# Patient Record
Sex: FEMALE | Race: WHITE | NOT HISPANIC OR LATINO | Employment: FULL TIME | URBAN - METROPOLITAN AREA
[De-identification: names, ages, dates, MRNs, and addresses within clinical notes are randomized per-mention and may not be internally consistent; named-entity substitution may affect disease eponyms.]

---

## 2019-01-10 ENCOUNTER — OFFICE VISIT (OUTPATIENT)
Dept: INTERNAL MEDICINE | Facility: CLINIC | Age: 69
End: 2019-01-10
Attending: INTERNAL MEDICINE
Payer: MEDICARE

## 2019-01-10 ENCOUNTER — LAB VISIT (OUTPATIENT)
Dept: LAB | Facility: OTHER | Age: 69
End: 2019-01-10
Attending: INTERNAL MEDICINE
Payer: MEDICARE

## 2019-01-10 VITALS
BODY MASS INDEX: 32 KG/M2 | HEART RATE: 85 BPM | WEIGHT: 163 LBS | HEIGHT: 60 IN | OXYGEN SATURATION: 97 % | DIASTOLIC BLOOD PRESSURE: 68 MMHG | SYSTOLIC BLOOD PRESSURE: 128 MMHG

## 2019-01-10 DIAGNOSIS — E55.9 VITAMIN D DEFICIENCY: ICD-10-CM

## 2019-01-10 DIAGNOSIS — D51.0 PERNICIOUS ANEMIA: ICD-10-CM

## 2019-01-10 DIAGNOSIS — Z87.19 HISTORY OF COLONIC DIVERTICULITIS: ICD-10-CM

## 2019-01-10 DIAGNOSIS — D50.8 OTHER IRON DEFICIENCY ANEMIA: ICD-10-CM

## 2019-01-10 DIAGNOSIS — E78.9 DISORDER OF LIPID METABOLISM: ICD-10-CM

## 2019-01-10 DIAGNOSIS — R79.89 OTHER SPECIFIED ABNORMAL FINDINGS OF BLOOD CHEMISTRY: ICD-10-CM

## 2019-01-10 DIAGNOSIS — H40.1124 PRIMARY OPEN ANGLE GLAUCOMA (POAG) OF LEFT EYE, INDETERMINATE STAGE: ICD-10-CM

## 2019-01-10 DIAGNOSIS — E03.9 HYPOTHYROIDISM, UNSPECIFIED TYPE: ICD-10-CM

## 2019-01-10 LAB
25(OH)D3+25(OH)D2 SERPL-MCNC: 29 NG/ML
ALBUMIN SERPL BCP-MCNC: 4.3 G/DL
ALP SERPL-CCNC: 105 U/L
ALT SERPL W/O P-5'-P-CCNC: 23 U/L
ANION GAP SERPL CALC-SCNC: 10 MMOL/L
AST SERPL-CCNC: 20 U/L
BASOPHILS # BLD AUTO: 0.02 K/UL
BASOPHILS NFR BLD: 0.3 %
BILIRUB SERPL-MCNC: 0.3 MG/DL
BUN SERPL-MCNC: 15 MG/DL
CALCIUM SERPL-MCNC: 10.1 MG/DL
CHLORIDE SERPL-SCNC: 102 MMOL/L
CHOLEST SERPL-MCNC: 232 MG/DL
CHOLEST/HDLC SERPL: 3.6 {RATIO}
CO2 SERPL-SCNC: 28 MMOL/L
CREAT SERPL-MCNC: 0.7 MG/DL
DIFFERENTIAL METHOD: ABNORMAL
EOSINOPHIL # BLD AUTO: 0 K/UL
EOSINOPHIL NFR BLD: 0.5 %
ERYTHROCYTE [DISTWIDTH] IN BLOOD BY AUTOMATED COUNT: 13 %
EST. GFR  (AFRICAN AMERICAN): >60 ML/MIN/1.73 M^2
EST. GFR  (NON AFRICAN AMERICAN): >60 ML/MIN/1.73 M^2
ESTIMATED AVG GLUCOSE: 114 MG/DL
GLUCOSE SERPL-MCNC: 94 MG/DL
HBA1C MFR BLD HPLC: 5.6 %
HCT VFR BLD AUTO: 42 %
HDLC SERPL-MCNC: 65 MG/DL
HDLC SERPL: 28 %
HGB BLD-MCNC: 14.1 G/DL
LDLC SERPL CALC-MCNC: 143.6 MG/DL
LYMPHOCYTES # BLD AUTO: 2 K/UL
LYMPHOCYTES NFR BLD: 27.1 %
MCH RBC QN AUTO: 31.8 PG
MCHC RBC AUTO-ENTMCNC: 33.6 G/DL
MCV RBC AUTO: 95 FL
MONOCYTES # BLD AUTO: 1.1 K/UL
MONOCYTES NFR BLD: 14.3 %
NEUTROPHILS # BLD AUTO: 4.3 K/UL
NEUTROPHILS NFR BLD: 57.7 %
NONHDLC SERPL-MCNC: 167 MG/DL
PLATELET # BLD AUTO: 316 K/UL
PMV BLD AUTO: 8.8 FL
POTASSIUM SERPL-SCNC: 3.9 MMOL/L
PROT SERPL-MCNC: 7.9 G/DL
RBC # BLD AUTO: 4.44 M/UL
SODIUM SERPL-SCNC: 140 MMOL/L
TRIGL SERPL-MCNC: 117 MG/DL
TSH SERPL DL<=0.005 MIU/L-ACNC: 3.91 UIU/ML
VIT B12 SERPL-MCNC: 788 PG/ML
WBC # BLD AUTO: 7.46 K/UL

## 2019-01-10 PROCEDURE — 99205 PR OFFICE/OUTPT VISIT, NEW, LEVL V, 60-74 MIN: ICD-10-PCS | Mod: S$GLB,,, | Performed by: INTERNAL MEDICINE

## 2019-01-10 PROCEDURE — 85025 COMPLETE CBC W/AUTO DIFF WBC: CPT | Mod: HCNC

## 2019-01-10 PROCEDURE — 86803 HEPATITIS C AB TEST: CPT | Mod: HCNC

## 2019-01-10 PROCEDURE — 83036 HEMOGLOBIN GLYCOSYLATED A1C: CPT | Mod: HCNC

## 2019-01-10 PROCEDURE — 82306 VITAMIN D 25 HYDROXY: CPT | Mod: HCNC

## 2019-01-10 PROCEDURE — 80053 COMPREHEN METABOLIC PANEL: CPT | Mod: HCNC

## 2019-01-10 PROCEDURE — 84443 ASSAY THYROID STIM HORMONE: CPT | Mod: HCNC

## 2019-01-10 PROCEDURE — 99205 OFFICE O/P NEW HI 60 MIN: CPT | Mod: S$GLB,,, | Performed by: INTERNAL MEDICINE

## 2019-01-10 PROCEDURE — 36415 COLL VENOUS BLD VENIPUNCTURE: CPT | Mod: HCNC

## 2019-01-10 PROCEDURE — 82607 VITAMIN B-12: CPT | Mod: HCNC

## 2019-01-10 PROCEDURE — 80061 LIPID PANEL: CPT | Mod: HCNC

## 2019-01-10 RX ORDER — CHOLECALCIFEROL (VITAMIN D3) 125 MCG
1 CAPSULE ORAL DAILY PRN
Status: ON HOLD | COMMUNITY
End: 2019-01-24 | Stop reason: HOSPADM

## 2019-01-10 RX ORDER — ASCORBIC ACID 500 MG
2500 TABLET ORAL DAILY
COMMUNITY

## 2019-01-10 NOTE — PROGRESS NOTES
Subjective:       Patient ID: Chely Kramer is a 68 y.o. female.    Chief Complaint: Annual Exam and Sinus Problem (nasal congestion (clear))    Had several MD at [facility] over the past 4 years. Got tired of having to go there every month (per their protocol).      Review of Systems   Constitutional: Negative.    Eyes: Negative.    Respiratory: Negative.    Cardiovascular: Negative.    Gastrointestinal: Negative.    Skin: Negative.    Neurological: Negative.    Psychiatric/Behavioral: Negative.  Negative for dysphoric mood.       Objective:      Physical Exam   Constitutional: She is oriented to person, place, and time. She appears well-developed and well-nourished.   HENT:   Head: Normocephalic and atraumatic.   Right Ear: External ear normal.   Left Ear: External ear normal.   Nose: Nose normal.   Mouth/Throat: Oropharynx is clear and moist. No oropharyngeal exudate.   Eyes: EOM are normal. Pupils are equal, round, and reactive to light. Right eye exhibits no discharge. Left eye exhibits no discharge.   Neck: Normal range of motion. Neck supple. No JVD present. No thyromegaly present.   Cardiovascular: Normal rate, regular rhythm and intact distal pulses. Exam reveals no gallop and no friction rub.   Murmur heard.   Crescendo decrescendo systolic murmur is present with a grade of 2/6.  @RUSB   Pulmonary/Chest: Effort normal and breath sounds normal. No respiratory distress. She has no rales. She exhibits no tenderness.   Abdominal: Soft. Bowel sounds are normal. There is no tenderness. There is no rebound.   Musculoskeletal: Normal range of motion. She exhibits no edema.   Neurological: She is alert and oriented to person, place, and time.   Skin: Skin is warm. She is not diaphoretic.   Psychiatric: She has a normal mood and affect.       Assessment:       1. Primary open angle glaucoma (POAG) of left eye, indeterminate stage    2. History of colonic diverticulitis        Plan:       Per orders and D/C  instructions.  Check labs.  Get records.

## 2019-01-10 NOTE — PATIENT INSTRUCTIONS
Tips for Healthy Living and Routine Preventative Care - 2019                                                            (These guidelines are intended for healthy adults)      1. Exercise  Exercise aerobically with a target heart rate of (220-age) x 0.8  Exercise 30-45 minutes on most days of the week    2. Diet and Supplements- All supplements can be obtained through a varied, healthy diet   Calcium: 1,000 - 1,200 mg each day   8 oz milk, Calcium fortified O.J., or Yogurt = 300 mg, 1oz of cheese =100-200 mg  Vitamin D: 800 iu each day- Can probably be obtained by 30 min. of direct sunlight    each day             3 oz. [place] = 800 iu,  3 oz. Tuna =150 iu, Milk or fortified O.J. = 120 iu  Fish oil: 1-2 grams each day or about 840 mg of EPA and DHA (Omega-3 fatty acids) each day             3 oz. [place]=2 grams,  3 oz. Tuna=1.3 grams,  3 oz. drained light Tuna= 0.25 grams  Folic acid 800 mcg each day for all women planning or capable of pregnancy    3. Lifestyle  Alcohol: 1 drink = 12 oz. domestic beer, 4 oz. wine, or 1 oz. hard (80 proof) liquor             Males: </= 14 drinks per week with no more than 4 in any one day             Females: </= 7 drinks per week with no more than 3 in any one day  Salt: 1.2 - 3 grams of Sodium each day.  Tobacco: Dont smoke, or quit smoking (discuss with your doctor)  Depression: If you feel depressed discuss with your doctor  Weight: Maintain a healthy body weight. Stay within 10% of:             Males: 106 lbs. + 6 lbs per inch height above 5 feet             Females: 100 lbs + 5 lbs per inch height above 5 feet    4. Routine tests  Blood pressure check at each visit, or at least once each year  HIV screening (one time) if less than 65 years old  Hepatitis C screen (one time) if born between 1945 and 1965  Cholesterol screening every 3 years starting at age 21  Glucose check every 2-3 years starting at age 45  TSH (thyroid screen) every 2 years starting at age 50  Colonoscopy  at age 50, and repeat every 10 years until age 75  Vision screen at age 65    Females:  Pap smear every three years starting at age 21                  Stop screening at age 65 if past 3 pap smears were normal                  No screening for women who have had a hysterectomy with removal of cervix  Mammogram every 1-2 years starting at age 40 until age 75 (yearly from age 45-54).  Bone density scan at about age 65      Males:  Consider PSA screening annually at age 50, age 45 for  Americans, until age 75                 5. Immunizations  Influenza vaccine every year in the fall, especially if >50 or with a chronic disease  Tetnus/Diptheria/Pertusis (Tdap) vaccine once (after the age of 18), then Tetnus/Diptheria (Td) vaccine every 10 years  Shingles (Shingrix) vaccine after age 50 and get a 2nd dose after 2-6 months  Pneumonia vaccine at age 65. 2nd Pneumonia vaccine at least 1 year later (1st should be Prevnar-13, and 2nd should be Pneumovax-23).

## 2019-01-11 PROBLEM — Z78.9 KNOWN MEDICAL PROBLEMS: Status: ACTIVE | Noted: 2019-01-11

## 2019-01-11 LAB — HCV AB SERPL QL IA: POSITIVE

## 2019-01-15 ENCOUNTER — DOCUMENTATION ONLY (OUTPATIENT)
Dept: INTERNAL MEDICINE | Facility: CLINIC | Age: 69
End: 2019-01-15
Payer: MEDICARE

## 2019-01-15 DIAGNOSIS — Z85.828 HX OF BASAL CELL CARCINOMA: ICD-10-CM

## 2019-01-15 DIAGNOSIS — Z11.4 ENCOUNTER FOR SCREENING FOR HIV: ICD-10-CM

## 2019-01-15 DIAGNOSIS — Z78.9 KNOWN MEDICAL PROBLEMS: ICD-10-CM

## 2019-01-15 DIAGNOSIS — R76.8 HEPATITIS C ANTIBODY TEST POSITIVE: Primary | ICD-10-CM

## 2019-01-15 DIAGNOSIS — H40.1124 PRIMARY OPEN ANGLE GLAUCOMA (POAG) OF LEFT EYE, INDETERMINATE STAGE: Primary | ICD-10-CM

## 2019-01-15 PROCEDURE — 99490 PR CHRONIC CARE MGMT, 1ST 20 MIN: ICD-10-PCS | Mod: S$GLB,,, | Performed by: INTERNAL MEDICINE

## 2019-01-15 PROCEDURE — 99490 CHRNC CARE MGMT STAFF 1ST 20: CPT | Mod: S$GLB,,, | Performed by: INTERNAL MEDICINE

## 2019-01-15 NOTE — PROGRESS NOTES
The patient's electronic chart was reviewed during this month. The patient's medical, functional, and psychosocial needs were assessed. Need for Home health care, PT, OT, , psychiatric care, and hospice was assessed. All preventative care measures were reviewed and updated. All medications were reviewed and reconciled. Potential drug interactions and medication adherence was reviewed. Prescriptions were renewed as appropriate. Education was provided to the patient and/or caregiver as needed, and all questions were answered. Over 20 minutes were spent providing these non-face-to-face services during this calendar month.    Reveiwed records from [facility] and updated chart.

## 2019-01-18 ENCOUNTER — OFFICE VISIT (OUTPATIENT)
Dept: INTERNAL MEDICINE | Facility: CLINIC | Age: 69
End: 2019-01-18
Attending: INTERNAL MEDICINE
Payer: MEDICARE

## 2019-01-18 VITALS
WEIGHT: 164 LBS | HEIGHT: 60 IN | HEART RATE: 93 BPM | DIASTOLIC BLOOD PRESSURE: 68 MMHG | OXYGEN SATURATION: 97 % | SYSTOLIC BLOOD PRESSURE: 100 MMHG | BODY MASS INDEX: 32.2 KG/M2

## 2019-01-18 DIAGNOSIS — R76.8 HEPATITIS C ANTIBODY POSITIVE IN BLOOD: ICD-10-CM
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
## 2019-01-18 DIAGNOSIS — L03.114 CELLULITIS OF LEFT UPPER EXTREMITY: ICD-10-CM

## 2019-01-18 PROCEDURE — 96372 THER/PROPH/DIAG INJ SC/IM: CPT | Mod: S$GLB,,, | Performed by: INTERNAL MEDICINE

## 2019-01-18 PROCEDURE — 99214 OFFICE O/P EST MOD 30 MIN: CPT | Mod: 25,S$GLB,, | Performed by: INTERNAL MEDICINE

## 2019-01-18 PROCEDURE — 99214 PR OFFICE/OUTPT VISIT, EST, LEVL IV, 30-39 MIN: ICD-10-PCS | Mod: 25,S$GLB,, | Performed by: INTERNAL MEDICINE

## 2019-01-18 PROCEDURE — 96372 PR INJECTION,THERAP/PROPH/DIAG2ST, IM OR SUBCUT: ICD-10-PCS | Mod: S$GLB,,, | Performed by: INTERNAL MEDICINE

## 2019-01-18 RX ORDER — HYDROCODONE BITARTRATE AND ACETAMINOPHEN 5; 325 MG/1; MG/1
1 TABLET ORAL EVERY 8 HOURS PRN
Qty: 20 TABLET | Refills: 0 | Status: SHIPPED | OUTPATIENT
Start: 2019-01-18 | End: 2019-01-21 | Stop reason: SDUPTHER

## 2019-01-18 RX ORDER — CEFTRIAXONE 1 G/1
1 INJECTION, POWDER, FOR SOLUTION INTRAMUSCULAR; INTRAVENOUS
Status: COMPLETED | OUTPATIENT
Start: 2019-01-18 | End: 2019-01-18

## 2019-01-18 RX ADMIN — CEFTRIAXONE 1 G: 1 INJECTION, POWDER, FOR SOLUTION INTRAMUSCULAR; INTRAVENOUS at 11:01

## 2019-01-18 NOTE — PROGRESS NOTES
Subjective:       Patient ID: Chely Kramer is a 68 y.o. female.    Chief Complaint: Animal Bite (cat bite on the left hand, Monday afternoon /  1/17/19) and Diarrhea    Bit by her cat 4 days ago.  Got worse yest. Went to . Given Rochephin IM and Augmentin.  A bit better today.      Animal Bite    The incident occurred more than 2 days ago. The incident occurred at home. There is an injury to the left hand. There is an injury to the left thumb. The pain is severe. It is unlikely that a foreign body is present. Pertinent negatives include no chest pain. Her tetanus status is UTD. She has been behaving normally.     Review of Systems   Constitutional: Negative.    Respiratory: Negative for shortness of breath.    Cardiovascular: Negative for chest pain.       Objective:      Physical Exam   Constitutional: She appears well-developed and well-nourished.   HENT:   Head: Normocephalic.   Eyes: Pupils are equal, round, and reactive to light.   Cardiovascular: Normal rate, regular rhythm and normal heart sounds. Exam reveals no friction rub.   Pulmonary/Chest: Effort normal.   Musculoskeletal:        Left hand: She exhibits decreased range of motion, tenderness and swelling. Normal strength noted.        Hands:  Neurological: She is alert.       Assessment:       1. Cat bite, subsequent encounter    2. Hepatitis C antibody positive in blood    3. Cellulitis of left upper extremity        Plan:       Per orders and D/C instructions.  Discussed Hep C Ab pos. She was not aware of it. Tests ordered at [facility] for next week.  Rochephin 1 g IM today and tomorrow (3 days total).  Cont Augmentin.     Ice, elevate.  Advil - 3 tabs three times each day with food.  RTO 3 days.  ED if worse.

## 2019-01-18 NOTE — PATIENT INSTRUCTIONS
Ice, elevate.  Advil - 3 tabs three times each day with food.  Take another Rocephin shot tomorrow.

## 2019-01-21 ENCOUNTER — OFFICE VISIT (OUTPATIENT)
Dept: INTERNAL MEDICINE | Facility: CLINIC | Age: 69
End: 2019-01-21
Attending: INTERNAL MEDICINE
Payer: MEDICARE

## 2019-01-21 VITALS
DIASTOLIC BLOOD PRESSURE: 78 MMHG | OXYGEN SATURATION: 97 % | HEART RATE: 116 BPM | HEIGHT: 60 IN | WEIGHT: 164 LBS | BODY MASS INDEX: 32.2 KG/M2 | SYSTOLIC BLOOD PRESSURE: 152 MMHG

## 2019-01-21 DIAGNOSIS — R76.8 HEPATITIS C ANTIBODY POSITIVE IN BLOOD: ICD-10-CM

## 2019-01-21 DIAGNOSIS — L03.90 CELLULITIS, UNSPECIFIED CELLULITIS SITE: ICD-10-CM
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
## 2019-01-21 PROCEDURE — 99213 PR OFFICE/OUTPT VISIT, EST, LEVL III, 20-29 MIN: ICD-10-PCS | Mod: S$GLB,,, | Performed by: INTERNAL MEDICINE

## 2019-01-21 PROCEDURE — 99213 OFFICE O/P EST LOW 20 MIN: CPT | Mod: S$GLB,,, | Performed by: INTERNAL MEDICINE

## 2019-01-21 RX ORDER — HYDROCODONE BITARTRATE AND ACETAMINOPHEN 5; 325 MG/1; MG/1
1 TABLET ORAL EVERY 8 HOURS PRN
Qty: 20 TABLET | Refills: 0 | Status: ON HOLD | OUTPATIENT
Start: 2019-01-21 | End: 2019-01-24 | Stop reason: SDUPTHER

## 2019-01-21 NOTE — PROGRESS NOTES
Subjective:       Patient ID: Chely Kramer is a 68 y.o. female.    Chief Complaint: Follow-up (hand still hurting); Cough; and Nasal Congestion    Bitten by her cat 7 days ago. Went to  4 days ago. Got Rocephin 1 g IM for 3 days. On Augmentin 875 BID. Still requires [place] for pain.      Review of Systems   Constitutional: Negative.    Respiratory: Negative for shortness of breath.    Cardiovascular: Negative for chest pain.       Objective:      Physical Exam   Constitutional: She appears well-developed and well-nourished.   HENT:   Head: Normocephalic.   Eyes: Pupils are equal, round, and reactive to light.   Cardiovascular: Normal rate, regular rhythm and normal heart sounds. Exam reveals no friction rub.   Pulmonary/Chest: Effort normal.   Musculoskeletal:        Left hand: She exhibits decreased range of motion, tenderness and swelling. Normal strength noted.        Hands:  About a 2 cm area of fluctuance dorsal base of thumb.   Neurological: She is alert.       Assessment:       1. Cat bite, subsequent encounter    2. Hepatitis C antibody positive in blood    3. Cellulitis, unspecified cellulitis site        Plan:       Per orders and D/C instructions.  Cont Augmentin for cat bite. See Hand Surgeon ASAP. May need drainage.  To lab for Hep C viral load, CBC, and ESR.

## 2019-01-22 ENCOUNTER — ANESTHESIA (OUTPATIENT)
Dept: SURGERY | Facility: OTHER | Age: 69
DRG: 513 | End: 2019-01-22
Payer: MEDICARE

## 2019-01-22 ENCOUNTER — HOSPITAL ENCOUNTER (INPATIENT)
Facility: OTHER | Age: 69
LOS: 2 days | Discharge: HOME | DRG: 513 | End: 2019-01-24
Attending: ORTHOPAEDIC SURGERY | Admitting: INTERNAL MEDICINE
Payer: MEDICARE

## 2019-01-22 ENCOUNTER — ANESTHESIA EVENT (OUTPATIENT)
Dept: SURGERY | Facility: OTHER | Age: 69
DRG: 513 | End: 2019-01-22
Payer: MEDICARE

## 2019-01-22 DIAGNOSIS — L03.119 CELLULITIS OF WRIST: Primary | ICD-10-CM

## 2019-01-22 DIAGNOSIS — L03.114 CELLULITIS OF HAND, LEFT: ICD-10-CM
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
## 2019-01-22 PROCEDURE — 88305 TISSUE SPECIMEN TO PATHOLOGY - SURGERY: ICD-10-PCS | Mod: 26,HCNC,, | Performed by: PATHOLOGY

## 2019-01-22 PROCEDURE — 36000705 HC OR TIME LEV I EA ADD 15 MIN: Mod: HCNC | Performed by: ORTHOPAEDIC SURGERY

## 2019-01-22 PROCEDURE — 88305 TISSUE EXAM BY PATHOLOGIST: CPT | Mod: 26,HCNC,, | Performed by: PATHOLOGY

## 2019-01-22 PROCEDURE — 87205 SMEAR GRAM STAIN: CPT | Mod: HCNC

## 2019-01-22 PROCEDURE — 87076 CULTURE ANAEROBE IDENT EACH: CPT | Mod: HCNC

## 2019-01-22 PROCEDURE — 25000003 PHARM REV CODE 250: Mod: HCNC | Performed by: ORTHOPAEDIC SURGERY

## 2019-01-22 PROCEDURE — 87070 CULTURE OTHR SPECIMN AEROBIC: CPT | Mod: HCNC

## 2019-01-22 PROCEDURE — 37000008 HC ANESTHESIA 1ST 15 MINUTES: Mod: HCNC | Performed by: ORTHOPAEDIC SURGERY

## 2019-01-22 PROCEDURE — 88305 TISSUE EXAM BY PATHOLOGIST: CPT | Mod: HCNC | Performed by: PATHOLOGY

## 2019-01-22 PROCEDURE — 87075 CULTR BACTERIA EXCEPT BLOOD: CPT | Mod: HCNC

## 2019-01-22 PROCEDURE — 63600175 PHARM REV CODE 636 W HCPCS: Mod: HCNC | Performed by: NURSE ANESTHETIST, CERTIFIED REGISTERED

## 2019-01-22 PROCEDURE — 94761 N-INVAS EAR/PLS OXIMETRY MLT: CPT | Mod: HCNC

## 2019-01-22 PROCEDURE — 71000039 HC RECOVERY, EACH ADD'L HOUR: Mod: HCNC | Performed by: ORTHOPAEDIC SURGERY

## 2019-01-22 PROCEDURE — 25000003 PHARM REV CODE 250: Mod: HCNC | Performed by: NURSE ANESTHETIST, CERTIFIED REGISTERED

## 2019-01-22 PROCEDURE — 25000003 PHARM REV CODE 250: Mod: HCNC | Performed by: INTERNAL MEDICINE

## 2019-01-22 PROCEDURE — 99223 PR INITIAL HOSPITAL CARE,LEVL III: ICD-10-PCS | Mod: AI,HCNC,, | Performed by: INTERNAL MEDICINE

## 2019-01-22 PROCEDURE — 63600175 PHARM REV CODE 636 W HCPCS: Mod: HCNC | Performed by: ANESTHESIOLOGY

## 2019-01-22 PROCEDURE — 99223 1ST HOSP IP/OBS HIGH 75: CPT | Mod: AI,HCNC,, | Performed by: INTERNAL MEDICINE

## 2019-01-22 PROCEDURE — 25000003 PHARM REV CODE 250: Mod: HCNC | Performed by: ANESTHESIOLOGY

## 2019-01-22 PROCEDURE — 87070 CULTURE OTHR SPECIMN AEROBIC: CPT | Mod: 59,HCNC

## 2019-01-22 PROCEDURE — 63600175 PHARM REV CODE 636 W HCPCS: Mod: HCNC | Performed by: INTERNAL MEDICINE

## 2019-01-22 PROCEDURE — 36000704 HC OR TIME LEV I 1ST 15 MIN: Mod: HCNC | Performed by: ORTHOPAEDIC SURGERY

## 2019-01-22 PROCEDURE — 11000001 HC ACUTE MED/SURG PRIVATE ROOM: Mod: HCNC

## 2019-01-22 PROCEDURE — 71000033 HC RECOVERY, INTIAL HOUR: Mod: HCNC | Performed by: ORTHOPAEDIC SURGERY

## 2019-01-22 PROCEDURE — 37000009 HC ANESTHESIA EA ADD 15 MINS: Mod: HCNC | Performed by: ORTHOPAEDIC SURGERY

## 2019-01-22 RX ORDER — FENTANYL CITRATE 50 UG/ML
25 INJECTION, SOLUTION INTRAMUSCULAR; INTRAVENOUS EVERY 5 MIN PRN
Status: COMPLETED | OUTPATIENT
Start: 2019-01-22 | End: 2019-01-22

## 2019-01-22 RX ORDER — SODIUM CHLORIDE 0.9 % (FLUSH) 0.9 %
3 SYRINGE (ML) INJECTION
Status: DISCONTINUED | OUTPATIENT
Start: 2019-01-22 | End: 2019-01-24 | Stop reason: HOSPADM

## 2019-01-22 RX ORDER — HYDROMORPHONE HYDROCHLORIDE 2 MG/ML
0.4 INJECTION, SOLUTION INTRAMUSCULAR; INTRAVENOUS; SUBCUTANEOUS EVERY 5 MIN PRN
Status: DISCONTINUED | OUTPATIENT
Start: 2019-01-22 | End: 2019-01-22 | Stop reason: HOSPADM

## 2019-01-22 RX ORDER — MIDAZOLAM HYDROCHLORIDE 1 MG/ML
INJECTION INTRAMUSCULAR; INTRAVENOUS
Status: DISCONTINUED | OUTPATIENT
Start: 2019-01-22 | End: 2019-01-22

## 2019-01-22 RX ORDER — PROPOFOL 10 MG/ML
VIAL (ML) INTRAVENOUS CONTINUOUS PRN
Status: DISCONTINUED | OUTPATIENT
Start: 2019-01-22 | End: 2019-01-22

## 2019-01-22 RX ORDER — MEPERIDINE HYDROCHLORIDE 25 MG/ML
12.5 INJECTION INTRAMUSCULAR; INTRAVENOUS; SUBCUTANEOUS ONCE AS NEEDED
Status: DISCONTINUED | OUTPATIENT
Start: 2019-01-22 | End: 2019-01-22 | Stop reason: HOSPADM

## 2019-01-22 RX ORDER — MORPHINE SULFATE 10 MG/ML
2 INJECTION INTRAMUSCULAR; INTRAVENOUS; SUBCUTANEOUS EVERY 4 HOURS PRN
Status: DISCONTINUED | OUTPATIENT
Start: 2019-01-22 | End: 2019-01-24 | Stop reason: HOSPADM

## 2019-01-22 RX ORDER — LIDOCAINE HYDROCHLORIDE 10 MG/ML
INJECTION, SOLUTION EPIDURAL; INFILTRATION; INTRACAUDAL; PERINEURAL
Status: DISCONTINUED | OUTPATIENT
Start: 2019-01-22 | End: 2019-01-22 | Stop reason: HOSPADM

## 2019-01-22 RX ORDER — ONDANSETRON 2 MG/ML
4 INJECTION INTRAMUSCULAR; INTRAVENOUS DAILY PRN
Status: DISCONTINUED | OUTPATIENT
Start: 2019-01-22 | End: 2019-01-22 | Stop reason: HOSPADM

## 2019-01-22 RX ORDER — OXYCODONE HYDROCHLORIDE 5 MG/1
5 TABLET ORAL
Status: DISCONTINUED | OUTPATIENT
Start: 2019-01-22 | End: 2019-01-22 | Stop reason: HOSPADM

## 2019-01-22 RX ORDER — LIDOCAINE HYDROCHLORIDE AND EPINEPHRINE 15; 5 MG/ML; UG/ML
INJECTION, SOLUTION EPIDURAL
Status: DISCONTINUED | OUTPATIENT
Start: 2019-01-22 | End: 2019-01-22 | Stop reason: HOSPADM

## 2019-01-22 RX ORDER — ONDANSETRON 2 MG/ML
INJECTION INTRAMUSCULAR; INTRAVENOUS
Status: DISCONTINUED | OUTPATIENT
Start: 2019-01-22 | End: 2019-01-22

## 2019-01-22 RX ORDER — PROPOFOL 10 MG/ML
VIAL (ML) INTRAVENOUS
Status: DISCONTINUED | OUTPATIENT
Start: 2019-01-22 | End: 2019-01-22

## 2019-01-22 RX ORDER — FENTANYL CITRATE 50 UG/ML
INJECTION, SOLUTION INTRAMUSCULAR; INTRAVENOUS
Status: DISCONTINUED | OUTPATIENT
Start: 2019-01-22 | End: 2019-01-22

## 2019-01-22 RX ORDER — AMOXICILLIN AND CLAVULANATE POTASSIUM 875; 125 MG/1; MG/1
1 TABLET, FILM COATED ORAL 2 TIMES DAILY
Status: ON HOLD | COMMUNITY
End: 2019-01-24 | Stop reason: HOSPADM

## 2019-01-22 RX ORDER — SODIUM CHLORIDE, SODIUM LACTATE, POTASSIUM CHLORIDE, CALCIUM CHLORIDE 600; 310; 30; 20 MG/100ML; MG/100ML; MG/100ML; MG/100ML
INJECTION, SOLUTION INTRAVENOUS CONTINUOUS PRN
Status: DISCONTINUED | OUTPATIENT
Start: 2019-01-22 | End: 2019-01-22

## 2019-01-22 RX ADMIN — FENTANYL CITRATE 50 MCG: 50 INJECTION, SOLUTION INTRAMUSCULAR; INTRAVENOUS at 03:01

## 2019-01-22 RX ADMIN — MIDAZOLAM HYDROCHLORIDE 2 MG: 1 INJECTION, SOLUTION INTRAMUSCULAR; INTRAVENOUS at 03:01

## 2019-01-22 RX ADMIN — FENTANYL CITRATE 25 MCG: 50 INJECTION, SOLUTION INTRAMUSCULAR; INTRAVENOUS at 04:01

## 2019-01-22 RX ADMIN — PROPOFOL 30 MG: 10 INJECTION, EMULSION INTRAVENOUS at 03:01

## 2019-01-22 RX ADMIN — SODIUM CHLORIDE, SODIUM LACTATE, POTASSIUM CHLORIDE, AND CALCIUM CHLORIDE: 600; 310; 30; 20 INJECTION, SOLUTION INTRAVENOUS at 03:01

## 2019-01-22 RX ADMIN — PIPERACILLIN AND TAZOBACTAM 4.5 G: 4; .5 INJECTION, POWDER, LYOPHILIZED, FOR SOLUTION INTRAVENOUS; PARENTERAL at 03:01

## 2019-01-22 RX ADMIN — PROPOFOL 80 MCG/KG/MIN: 10 INJECTION, EMULSION INTRAVENOUS at 03:01

## 2019-01-22 RX ADMIN — PROPOFOL 50 MG: 10 INJECTION, EMULSION INTRAVENOUS at 03:01

## 2019-01-22 RX ADMIN — HYDROMORPHONE HYDROCHLORIDE 0.4 MG: 2 INJECTION INTRAMUSCULAR; INTRAVENOUS; SUBCUTANEOUS at 08:01

## 2019-01-22 RX ADMIN — VANCOMYCIN HYDROCHLORIDE 1000 MG: 1 INJECTION, POWDER, LYOPHILIZED, FOR SOLUTION INTRAVENOUS at 04:01

## 2019-01-22 RX ADMIN — ONDANSETRON 4 MG: 2 INJECTION INTRAMUSCULAR; INTRAVENOUS at 04:01

## 2019-01-22 RX ADMIN — PROPOFOL 120 MG: 10 INJECTION, EMULSION INTRAVENOUS at 03:01

## 2019-01-22 RX ADMIN — OXYCODONE HYDROCHLORIDE 5 MG: 5 TABLET ORAL at 04:01

## 2019-01-22 RX ADMIN — Medication: at 03:01

## 2019-01-22 RX ADMIN — PROPOFOL 40 MG: 10 INJECTION, EMULSION INTRAVENOUS at 03:01

## 2019-01-22 NOTE — BRIEF OP NOTE
[facility]  Brief Operative Note    SUMMARY     Surgery Date: 1/22/2019     Surgeon(s) and Role:     * Sameer Evans MD - Primary    Assisting Surgeon: None    Pre-op Diagnosis:  Cellulitis of left hand [L03.114]    Post-op Diagnosis:  Post-Op Diagnosis Codes:     * Cellulitis of left hand [L03.114]    Procedure(s) (LRB):  INCISION AND DRAINAGE, HAND (Left)    Anesthesia: Regional    Description of Procedure: I&D    Description of the findings of the procedure: dictated    Estimated Blood Loss: * No values recorded between 1/22/2019  3:43 PM and 1/22/2019  4:08 PM *         Specimens:   Specimen (12h ago, onward)    None

## 2019-01-22 NOTE — ANESTHESIA POSTPROCEDURE EVALUATION
Anesthesia Post Evaluation    Patient: Chely Kramer    Procedure(s) Performed: Procedure(s) (LRB):  INCISION AND DRAINAGE, HAND (Left)    Final Anesthesia Type: general  Patient location during evaluation: PACU  Patient participation: Yes- Able to Participate  Level of consciousness: awake and alert  Post-procedure vital signs: reviewed and stable  Pain management: adequate  Airway patency: patent  PONV status at discharge: No PONV  Anesthetic complications: no      Cardiovascular status: blood pressure returned to baseline  Respiratory status: unassisted, spontaneous ventilation and room air  Hydration status: euvolemic  Follow-up not needed.        Visit Vitals  BP (!) 151/76   Pulse 77   Temp 36.9 °C (98.5 °F) (Oral)   Resp (!) 22   Ht 5' (1.524 m)   Wt 74.4 kg (164 lb)   SpO2 97%   Breastfeeding? No   BMI 32.03 kg/m²       Pain/Emily Score: Pain Rating Prior to Med Admin: 8 (1/22/2019  4:27 PM)

## 2019-01-22 NOTE — TRANSFER OF CARE
Anesthesia Transfer of Care Note    Patient: Chely Kramer    Procedure(s) Performed: Procedure(s) (LRB):  INCISION AND DRAINAGE, HAND (Left)    Patient location: PACU    Anesthesia Type: general    Transport from OR: Transported from OR on 2-3 L/min O2 by NC with adequate spontaneous ventilation    Post pain: adequate analgesia    Post assessment: no apparent anesthetic complications    Post vital signs: stable    Level of consciousness: awake, alert and oriented    Nausea/Vomiting: no nausea/vomiting    Complications: none          Last vitals:   Visit Vitals  /61 (BP Location: Right arm, Patient Position: Lying)   Pulse 94   Temp 36.9 °C (98.5 °F) (Oral)   Resp 18   Ht 5' (1.524 m)   Wt 74.4 kg (164 lb)   SpO2 98%   Breastfeeding? No   BMI 32.03 kg/m²

## 2019-01-22 NOTE — ANESTHESIA PREPROCEDURE EVALUATION
01/22/2019  Chely Kramer is a 68 y.o., female.    Anesthesia Evaluation    I have reviewed the Patient Summary Reports.    I have reviewed the Nursing Notes.   I have reviewed the Medications.     Review of Systems  Anesthesia Hx:  No problems with previous Anesthesia  History of prior surgery of interest to airway management or planning: Denies Family Hx of Anesthesia complications.   Denies Personal Hx of Anesthesia complications.   Social:  Non-Smoker    Hematology/Oncology:  Hematology Normal   Oncology Normal     EENT/Dental:EENT/Dental Normal   Cardiovascular:  Cardiovascular Normal     Pulmonary:  Pulmonary Normal    Renal/:  Renal/ Normal     Hepatic/GI:  Hepatic/GI Normal    Musculoskeletal:  Musculoskeletal Normal    Neurological:  Neurology Normal    Endocrine:  Endocrine Normal    Dermatological:  Skin Normal    Psych:  Psychiatric Normal           Physical Exam  General:  Obesity    Airway/Jaw/Neck:  Airway Findings: Mouth Opening: Normal Tongue: Normal  General Airway Assessment: Adult  Mallampati: II      Dental:  Dental Findings: In tact        Mental Status:  Mental Status Findings:  Cooperative, Alert and Oriented         Anesthesia Plan  Type of Anesthesia, risks & benefits discussed:  Anesthesia Type:  regional  Patient's Preference:   Intra-op Monitoring Plan:   Intra-op Monitoring Plan Comments:   Post Op Pain Control Plan: multimodal analgesia  Post Op Pain Control Plan Comments:   Induction:   IV  Beta Blocker:         Informed Consent: Patient understands risks and agrees with Anesthesia plan.  Questions answered. Anesthesia consent signed with patient.  ASA Score: 1  emergent   Day of Surgery Review of History & Physical:    H&P update referred to the surgeon.         Ready For Surgery From Anesthesia Perspective.

## 2019-01-23 PROBLEM — L03.119 CELLULITIS OF WRIST: Status: ACTIVE | Noted: 2019-01-22

## 2019-01-23 LAB
ALBUMIN SERPL BCP-MCNC: 2.9 G/DL
ALP SERPL-CCNC: 86 U/L
ALT SERPL W/O P-5'-P-CCNC: 20 U/L
ANION GAP SERPL CALC-SCNC: 9 MMOL/L
AST SERPL-CCNC: 21 U/L
BASOPHILS # BLD AUTO: 0.02 K/UL
BASOPHILS NFR BLD: 0.2 %
BILIRUB SERPL-MCNC: 0.3 MG/DL
BUN SERPL-MCNC: 16 MG/DL
CALCIUM SERPL-MCNC: 8.8 MG/DL
CHLORIDE SERPL-SCNC: 104 MMOL/L
CO2 SERPL-SCNC: 24 MMOL/L
CREAT SERPL-MCNC: 0.7 MG/DL
CRP SERPL-MCNC: 66.33 MG/L
DIFFERENTIAL METHOD: ABNORMAL
EOSINOPHIL # BLD AUTO: 0.1 K/UL
EOSINOPHIL NFR BLD: 0.6 %
ERYTHROCYTE [DISTWIDTH] IN BLOOD BY AUTOMATED COUNT: 12.8 %
ERYTHROCYTE [SEDIMENTATION RATE] IN BLOOD: 50 MM/HR
EST. GFR  (AFRICAN AMERICAN): >60 ML/MIN/1.73 M^2
EST. GFR  (NON AFRICAN AMERICAN): >60 ML/MIN/1.73 M^2
GLUCOSE SERPL-MCNC: 121 MG/DL
HCT VFR BLD AUTO: 34.8 %
HGB BLD-MCNC: 11.4 G/DL
LYMPHOCYTES # BLD AUTO: 2.6 K/UL
LYMPHOCYTES NFR BLD: 23.4 %
MAGNESIUM SERPL-MCNC: 2.1 MG/DL
MCH RBC QN AUTO: 31 PG
MCHC RBC AUTO-ENTMCNC: 32.8 G/DL
MCV RBC AUTO: 95 FL
MONOCYTES # BLD AUTO: 1.3 K/UL
MONOCYTES NFR BLD: 11.8 %
NEUTROPHILS # BLD AUTO: 7 K/UL
NEUTROPHILS NFR BLD: 63.8 %
PHOSPHATE SERPL-MCNC: 2.6 MG/DL
PLATELET # BLD AUTO: 305 K/UL
PMV BLD AUTO: 8.5 FL
POTASSIUM SERPL-SCNC: 3.8 MMOL/L
PROT SERPL-MCNC: 6.4 G/DL
RBC # BLD AUTO: 3.68 M/UL
SODIUM SERPL-SCNC: 137 MMOL/L
WBC # BLD AUTO: 10.96 K/UL

## 2019-01-23 PROCEDURE — 85025 COMPLETE CBC W/AUTO DIFF WBC: CPT | Mod: HCNC

## 2019-01-23 PROCEDURE — 94761 N-INVAS EAR/PLS OXIMETRY MLT: CPT | Mod: HCNC

## 2019-01-23 PROCEDURE — 80053 COMPREHEN METABOLIC PANEL: CPT | Mod: HCNC

## 2019-01-23 PROCEDURE — 83735 ASSAY OF MAGNESIUM: CPT | Mod: HCNC

## 2019-01-23 PROCEDURE — 85651 RBC SED RATE NONAUTOMATED: CPT | Mod: HCNC

## 2019-01-23 PROCEDURE — 90471 IMMUNIZATION ADMIN: CPT | Mod: HCNC | Performed by: INTERNAL MEDICINE

## 2019-01-23 PROCEDURE — 99233 PR SUBSEQUENT HOSPITAL CARE,LEVL III: ICD-10-PCS | Mod: HCNC,,, | Performed by: INTERNAL MEDICINE

## 2019-01-23 PROCEDURE — 90714 TD VACC NO PRESV 7 YRS+ IM: CPT | Mod: HCNC | Performed by: INTERNAL MEDICINE

## 2019-01-23 PROCEDURE — 63600175 PHARM REV CODE 636 W HCPCS: Mod: HCNC | Performed by: INTERNAL MEDICINE

## 2019-01-23 PROCEDURE — 25000003 PHARM REV CODE 250: Mod: HCNC | Performed by: INTERNAL MEDICINE

## 2019-01-23 PROCEDURE — 99223 1ST HOSP IP/OBS HIGH 75: CPT | Mod: HCNC,,, | Performed by: INTERNAL MEDICINE

## 2019-01-23 PROCEDURE — 99223 PR INITIAL HOSPITAL CARE,LEVL III: ICD-10-PCS | Mod: HCNC,,, | Performed by: INTERNAL MEDICINE

## 2019-01-23 PROCEDURE — 11000001 HC ACUTE MED/SURG PRIVATE ROOM: Mod: HCNC

## 2019-01-23 PROCEDURE — 86141 C-REACTIVE PROTEIN HS: CPT | Mod: HCNC

## 2019-01-23 PROCEDURE — 99233 SBSQ HOSP IP/OBS HIGH 50: CPT | Mod: HCNC,,, | Performed by: INTERNAL MEDICINE

## 2019-01-23 PROCEDURE — 36415 COLL VENOUS BLD VENIPUNCTURE: CPT | Mod: HCNC

## 2019-01-23 PROCEDURE — 25000003 PHARM REV CODE 250: Mod: HCNC | Performed by: PHYSICIAN ASSISTANT

## 2019-01-23 PROCEDURE — 84100 ASSAY OF PHOSPHORUS: CPT | Mod: HCNC

## 2019-01-23 RX ORDER — ENOXAPARIN SODIUM 100 MG/ML
40 INJECTION SUBCUTANEOUS EVERY 24 HOURS
Status: DISCONTINUED | OUTPATIENT
Start: 2019-01-23 | End: 2019-01-24 | Stop reason: HOSPADM

## 2019-01-23 RX ORDER — HYDROCODONE BITARTRATE AND ACETAMINOPHEN 5; 325 MG/1; MG/1
1 TABLET ORAL EVERY 6 HOURS PRN
Status: DISCONTINUED | OUTPATIENT
Start: 2019-01-23 | End: 2019-01-24 | Stop reason: HOSPADM

## 2019-01-23 RX ORDER — ACETAMINOPHEN 325 MG/1
650 TABLET ORAL EVERY 8 HOURS PRN
Status: DISCONTINUED | OUTPATIENT
Start: 2019-01-23 | End: 2019-01-24 | Stop reason: HOSPADM

## 2019-01-23 RX ADMIN — PIPERACILLIN AND TAZOBACTAM 4.5 G: 4; .5 INJECTION, POWDER, LYOPHILIZED, FOR SOLUTION INTRAVENOUS; PARENTERAL at 09:01

## 2019-01-23 RX ADMIN — HYDROCODONE BITARTRATE AND ACETAMINOPHEN 1 TABLET: 5; 325 TABLET ORAL at 09:01

## 2019-01-23 RX ADMIN — CLOSTRIDIUM TETANI TOXOID ANTIGEN (FORMALDEHYDE INACTIVATED) AND CORYNEBACTERIUM DIPHTHERIAE TOXOID ANTIGEN (FORMALDEHYDE INACTIVATED) 0.5 ML: 5; 2 INJECTION, SUSPENSION INTRAMUSCULAR at 12:01

## 2019-01-23 RX ADMIN — PIPERACILLIN AND TAZOBACTAM 4.5 G: 4; .5 INJECTION, POWDER, LYOPHILIZED, FOR SOLUTION INTRAVENOUS; PARENTERAL at 03:01

## 2019-01-23 RX ADMIN — PIPERACILLIN AND TAZOBACTAM 4.5 G: 4; .5 INJECTION, POWDER, LYOPHILIZED, FOR SOLUTION INTRAVENOUS; PARENTERAL at 12:01

## 2019-01-23 RX ADMIN — VANCOMYCIN HYDROCHLORIDE 1250 MG: 10 INJECTION, POWDER, LYOPHILIZED, FOR SOLUTION INTRAVENOUS at 04:01

## 2019-01-23 RX ADMIN — HYDROCODONE BITARTRATE AND ACETAMINOPHEN 1 TABLET: 5; 325 TABLET ORAL at 04:01

## 2019-01-23 RX ADMIN — HYDROCODONE BITARTRATE AND ACETAMINOPHEN 1 TABLET: 5; 325 TABLET ORAL at 01:01

## 2019-01-23 NOTE — PLAN OF CARE
Problem: Adult Inpatient Plan of Care  Goal: Plan of Care Review  Outcome: Ongoing (interventions implemented as appropriate)  Pt in no distress on room air will continue to monitor.

## 2019-01-23 NOTE — HOSPITAL COURSE
Per physician report, there was inflammation around carpal joint.Her drain was removed.Cultures from procedure were negative.Id was consulted and recommended doxycycline and rifampin with id follow up in 2 weeks with inflammatory markers.Esr improved from 72 (1/21) to 50(1/23).Crp was 66.33.Joint cell count was ordered but did not see report.Her hepatitis antibody was positive but qualitative hcv was negative.She was discharged on oral pain meds and follow with ortho next week.She was also given tetanus shot.

## 2019-01-23 NOTE — PLAN OF CARE
Problem: Adult Inpatient Plan of Care  Goal: Plan of Care Review  Outcome: Ongoing (interventions implemented as appropriate)  Patient remains free from injury or falls. Vital signs stable throughout night on room air. Positions self independently and ambulating to the bathroom. Voiding adequately through shift. Pain managed with  PO medications.  Patient has a sling in place over left arm, swelling to fingers. Patient is able to move fingers and pulses are strong. Bed in low locked position and call light within reach. Purposeful rounding performed. Will continue to monitor.

## 2019-01-23 NOTE — SUBJECTIVE & OBJECTIVE
Interval History: left wrist pain better than yesterday    Review of Systems   Constitutional: Negative for chills and fever.   HENT: Negative for sinus pain and trouble swallowing.    Respiratory: Negative for cough and shortness of breath.    Cardiovascular: Negative for chest pain and leg swelling.   Gastrointestinal: Negative for abdominal pain, blood in stool, nausea and vomiting.   Genitourinary: Negative for dysuria and hematuria.   Musculoskeletal: Positive for arthralgias and joint swelling.   Skin: Positive for color change.   Neurological: Negative for numbness and headaches.   Psychiatric/Behavioral: Negative for confusion. The patient is nervous/anxious.      Objective:     Vital Signs (Most Recent):  Temp: 98 °F (36.7 °C) (01/23/19 1213)  Pulse: 65 (01/23/19 1213)  Resp: 18 (01/23/19 1213)  BP: (!) 125/58 (01/23/19 1213)  SpO2: (!) 94 % (01/23/19 1213) Vital Signs (24h Range):  Temp:  [98 °F (36.7 °C)-98.5 °F (36.9 °C)] 98 °F (36.7 °C)  Pulse:  [] 65  Resp:  [18-22] 18  SpO2:  [87 %-100 %] 94 %  BP: (119-167)/() 125/58     Weight: 74.4 kg (164 lb)  Body mass index is 30.99 kg/m².    Intake/Output Summary (Last 24 hours) at 1/23/2019 1526  Last data filed at 1/23/2019 0500  Gross per 24 hour   Intake 640 ml   Output 350 ml   Net 290 ml      Physical Exam   Constitutional: She is oriented to person, place, and time. No distress.   HENT:   Head: Normocephalic and atraumatic.   Eyes: EOM are normal. Pupils are equal, round, and reactive to light.   Neck: Normal range of motion. Neck supple.   Cardiovascular: Normal rate and regular rhythm.   Pulmonary/Chest: Effort normal and breath sounds normal. No respiratory distress.   Abdominal: Soft. Bowel sounds are normal. She exhibits no distension. There is no tenderness.   Musculoskeletal:   Left wrist and forearm, dressed and in sling, able to move left hand fingers   Neurological: She is alert and oriented to person, place, and time. No cranial  nerve deficit.   Skin: Skin is warm.   Psychiatric: She has a normal mood and affect.   Vitals reviewed.      Significant Labs:   BMP:   Recent Labs   Lab 01/23/19  0542   *      K 3.8      CO2 24   BUN 16   CREATININE 0.7   CALCIUM 8.8   MG 2.1     CBC:   Recent Labs   Lab 01/23/19  0542   WBC 10.96   HGB 11.4*   HCT 34.8*          Significant Imaging: I have reviewed all pertinent imaging results/findings within the past 24 hours.

## 2019-01-23 NOTE — HPI
68F with glaucoma admitted with redness and swelling and pain of L wrist and forearm after an adult cat bite. Had trouble moving joint. Several days after bite, was given an antibiotic shot and po augmentin and tetanus booster. Thinks the wrist swelling went down some, but not significantly. Then saw ortho outpatient and was admitted for I&D and iv abx. Reports pain and swelling improving since washout. Denies fever or other joint symptoms. Has been on vanc/zosyn. Per hospitalist, wrist fluid looked inflammatory, but not purulent. Cultures NGTD. Pt asking to go home.

## 2019-01-23 NOTE — CONSULTS
[facility]  Infectious Disease  Consult Note    Patient Name: Chely Kramer  MRN: [id number]  Admission Date: 1/22/2019  Hospital Length of Stay: 1 days  Attending Physician: Ayesha Mendiola MD  Primary Care Provider: KIA Herrera MD     Isolation Status: No active isolations    Patient information was obtained from patient and ER records.      Inpatient consult to Infectious Diseases  Consult performed by: Sandra Xavier MD  Consult ordered by: Ayesha Mendiola MD        Assessment/Plan:     * Cellulitis of wrist    Based on history, pt had cellulitis of L forearm and possible septic arthritis of L wrist. No cell count available (have added on) and wrist culture NGTD (which could have been because she had been on abx). Needs coverage for pasturella and staph and other feline oral jaymie. On discharge, recommend changing antibiotics to oral doxycycline 100mg po bid and rifampin 300mg po bid. Have requested ID follow up in 2 weeks. Would like ESR/cRp repeated prior to this appointment. Low threshold for continuing abx for 6 weeks in case bone involved.      Hepatitis C antibody positive in blood    20% of people with HCV ab+ clear virus on own. If HCV RNA is positive, needs referral to hep C clinic for treatment. hcc screening q6mo imaging outpatient.          Thank you for your consult. I will follow-up with patient. Please contact us if you have any additional questions.    Sandra Xavier MD  Infectious Disease  [facility]    Subjective:     Principal Problem: Cellulitis of wrist    HPI: 68F with glaucoma admitted with redness and swelling and pain of L wrist and forearm after an adult cat bite. Had trouble moving joint. Several days after bite, was given an antibiotic shot and po augmentin and tetanus booster. Thinks the wrist swelling went down some, but not significantly. Then saw ortho outpatient and was admitted for I&D and iv abx. Reports pain and swelling improving since  washout. Denies fever or other joint symptoms. Has been on vanc/zosyn. Per hospitalist, wrist fluid looked inflammatory, but not purulent. Cultures NGTD. Pt asking to go home.       Interval History: left wrist pain better than yesterday    Review of Systems   Constitutional: Negative for chills and fever.   HENT: Negative for sinus pain and trouble swallowing.    Respiratory: Negative for cough and shortness of breath.    Cardiovascular: Negative for chest pain and leg swelling.   Gastrointestinal: Negative for abdominal pain, blood in stool, nausea and vomiting.   Genitourinary: Negative for dysuria and hematuria.   Musculoskeletal: Positive for arthralgias and joint swelling.   Skin: Positive for color change.   Neurological: Negative for numbness and headaches.   Psychiatric/Behavioral: Negative for confusion. The patient is nervous/anxious.      Objective:     Vital Signs (Most Recent):  Temp: 98 °F (36.7 °C) (01/23/19 1213)  Pulse: 65 (01/23/19 1213)  Resp: 18 (01/23/19 1213)  BP: (!) 125/58 (01/23/19 1213)  SpO2: (!) 94 % (01/23/19 1213) Vital Signs (24h Range):  Temp:  [98 °F (36.7 °C)-98.5 °F (36.9 °C)] 98 °F (36.7 °C)  Pulse:  [] 65  Resp:  [18-22] 18  SpO2:  [87 %-100 %] 94 %  BP: (119-167)/() 125/58     Weight: 74.4 kg (164 lb)  Body mass index is 30.99 kg/m².    Intake/Output Summary (Last 24 hours) at 1/23/2019 1526  Last data filed at 1/23/2019 0500  Gross per 24 hour   Intake 640 ml   Output 350 ml   Net 290 ml      Physical Exam   Constitutional: She is oriented to person, place, and time. No distress.   HENT:   Head: Normocephalic and atraumatic.   Eyes: EOM are normal. Pupils are equal, round, and reactive to light.   Neck: Normal range of motion. Neck supple.   Cardiovascular: Normal rate and regular rhythm.   Pulmonary/Chest: Effort normal and breath sounds normal. No respiratory distress.   Abdominal: Soft. Bowel sounds are normal. She exhibits no distension. There is no tenderness.    Musculoskeletal:   Left wrist and forearm, dressed and in sling, able to move left hand fingers   Neurological: She is alert and oriented to person, place, and time. No cranial nerve deficit.   Skin: Skin is warm.   Psychiatric: She has a normal mood and affect.   Vitals reviewed.      Significant Labs:   BMP:   Recent Labs   Lab 01/23/19  0542   *      K 3.8      CO2 24   BUN 16   CREATININE 0.7   CALCIUM 8.8   MG 2.1     CBC:   Recent Labs   Lab 01/23/19  0542   WBC 10.96   HGB 11.4*   HCT 34.8*          Significant Imaging: I have reviewed all pertinent imaging results/findings within the past 24 hours.

## 2019-01-23 NOTE — OR NURSING
Pain improving to shoulder and to lt forearm.  Relaxing and breathing slower.  Waiting on a room assignment.  Moves fingers slightly and has numbness and tingling to fingers.

## 2019-01-23 NOTE — PLAN OF CARE
Problem: Adult Inpatient Plan of Care  Goal: Plan of Care Review  Outcome: Ongoing (interventions implemented as appropriate)  No changes at this time.  Will continue to monitor.

## 2019-01-23 NOTE — PROGRESS NOTES
[facility] Hospital Medicine  Progress Note    Patient Name: Chely Kramer  MRN: [id number]  Patient Class: IP- Inpatient   Admission Date: 1/22/2019  Length of Stay: 1 days  Attending Physician: Ayesha Mendiola MD  Primary Care Provider: KIA Herrera MD        Subjective:     Principal Problem:Cellulitis of wrist    HPI:  68 year old female with past significant medical history of recently diagnosed hep c positive antibody, was admitted from orthopedics office for iv antibiotics and I and d of left wrist.She was bitten by her cat about a week ago.Initially did feel some pain but started noticing swelling and redness the next day for which went to urgent care.According to her she was given a shot, not tetanus shot and was on augmentin.She saw her pcp yesterday and was made an appointment to see orthopedics and was admitted for I and d and iv antibiotics.I saw her post op in recovery and was complaining of pain in her wrist for which was getting iv fentanyl.She was started on vanc and zosyn and cx were done during procedure.    Hospital Course:  Awaiting cultures from wrist.Per physician report, there was inflammation around carpal joint.    Interval History: left wrist pain better than yesterday    Review of Systems   Constitutional: Negative for chills and fever.   HENT: Negative for sinus pain and trouble swallowing.    Respiratory: Negative for cough and shortness of breath.    Cardiovascular: Negative for chest pain and leg swelling.   Gastrointestinal: Negative for abdominal pain, blood in stool, nausea and vomiting.   Genitourinary: Negative for dysuria and hematuria.   Musculoskeletal: Positive for arthralgias and joint swelling.   Skin: Positive for color change.   Neurological: Negative for numbness and headaches.   Psychiatric/Behavioral: Negative for confusion. The patient is nervous/anxious.      Objective:     Vital Signs (Most Recent):  Temp: 98.3 °F (36.8 °C) (01/23/19 0800)  Pulse:  74 (01/23/19 0800)  Resp: 18 (01/23/19 0800)  BP: (!) 151/71 (01/23/19 0800)  SpO2: 98 % (01/23/19 0815) Vital Signs (24h Range):  Temp:  [98.1 °F (36.7 °C)-98.5 °F (36.9 °C)] 98.3 °F (36.8 °C)  Pulse:  [] 74  Resp:  [18-22] 18  SpO2:  [87 %-100 %] 98 %  BP: (119-167)/() 151/71     Weight: 74.4 kg (164 lb)  Body mass index is 30.99 kg/m².    Intake/Output Summary (Last 24 hours) at 1/23/2019 1003  Last data filed at 1/23/2019 0500  Gross per 24 hour   Intake 640 ml   Output 350 ml   Net 290 ml      Physical Exam   Constitutional: She is oriented to person, place, and time. No distress.   HENT:   Head: Normocephalic and atraumatic.   Eyes: EOM are normal. Pupils are equal, round, and reactive to light.   Neck: Normal range of motion. Neck supple.   Cardiovascular: Normal rate and regular rhythm.   Pulmonary/Chest: Effort normal and breath sounds normal. No respiratory distress.   Abdominal: Soft. Bowel sounds are normal. She exhibits no distension. There is no tenderness.   Musculoskeletal:   Left wrist and forearm, dressed and in sling, able to move left hand fingers   Neurological: She is alert and oriented to person, place, and time. No cranial nerve deficit.   Skin: Skin is warm.   Psychiatric: She has a normal mood and affect.   Vitals reviewed.      Significant Labs:   BMP:   Recent Labs   Lab 01/23/19  0542   *      K 3.8      CO2 24   BUN 16   CREATININE 0.7   CALCIUM 8.8   MG 2.1     CBC:   Recent Labs   Lab 01/21/19  1159 01/23/19  0542   WBC 11.72 10.96   HGB 12.8 11.4*   HCT 38.5 34.8*   * 305       Significant Imaging: I have reviewed all pertinent imaging results/findings within the past 24 hours.    Assessment/Plan:      * Cellulitis of wrist    Patient s/p I and d by ortho of left wrist  Continue vanc and zosyn   Follow joint cx  Prn iv  And po pain meds  Monitor esr/crp  Will also get is recs for abx on discharge  Patient wants to go home in am         Cat bite     Last tetanus shot about 4 and half years ago  S/p  tetanus shot yesterday       Hepatitis C antibody positive in blood    Outpatient follow up and viral load per primary care         VTE Risk Mitigation (From admission, onward)        Ordered     Place sequential compression device  Until discontinued      01/22/19 2333              Ayesha Mendiola MD  Department of Hospital Medicine   [facility]

## 2019-01-23 NOTE — H&P
[facility] Hospital Medicine  History & Physical    Patient Name: Chely Kramer  MRN: [id number]  Admission Date: 1/22/2019  Attending Physician: Ayesha Mendiola MD   Primary Care Provider: KIA Herrera MD         Patient information was obtained from patient, past medical records and ER records.     Subjective:     Principal Problem:Cellulitis of hand, left    Chief Complaint: No chief complaint on file.       HPI: 68 year old female with past significant medical history of recently diagnosed hep c positive antibody, was admitted from orthopedics office for iv antibiotics and I and d of left wrist.She was bitten by her cat about a week ago.Initially did feel some pain but started noticing swelling and redness the next day for which went to urgent care.According to her she was given a shot, not tetanus shot and was on augmentin.She saw her pcp yesterday and was made an appointment to see orthopedics and was admitted for I and d and iv antibiotics.I saw her post op in recovery and was complaining of pain in her wrist for which was getting iv fentanyl.She was started on vanc and zosyn and cx were done during procedure.    Past Medical History:   Diagnosis Date    Glaucoma        Past Surgical History:   Procedure Laterality Date    ADENOIDECTOMY      APPENDECTOMY  1998    Ruptured    HERNIA REPAIR Right 2003    [facility]    TONSILLECTOMY         Review of patient's allergies indicates:  No Known Allergies    No current facility-administered medications on file prior to encounter.      Current Outpatient Medications on File Prior to Encounter   Medication Sig    amoxicillin-clavulanate 875-125mg (AUGMENTIN) 875-125 mg per tablet Take 1 tablet by mouth 2 (two) times daily.    ascorbic acid, vitamin C, (VITAMIN C) 500 MG tablet Take 2,500 mg by mouth once daily.    HYDROcodone-acetaminophen (NORCO) 5-325 mg per tablet Take 1 tablet by mouth every 8 (eight) hours as needed for Pain.    naproxen  sodium 220 mg Cap Take 1 tablet by mouth daily as needed.     Family History     Problem Relation (Age of Onset)    Heart disease Father (74)    Mental illness Brother    Parkinsonism Brother        Tobacco Use    Smoking status: Former Smoker     Types: Cigarettes     Last attempt to quit: 5/3/1992     Years since quittin.7    Smokeless tobacco: Never Used   Substance and Sexual Activity    Alcohol use: Yes     Alcohol/week: 2.4 oz     Types: 4 Shots of liquor per week     Frequency: 2-3 times a week     Drinks per session: 1 or 2     Binge frequency: Never    Drug use: No    Sexual activity: No     Review of Systems   Constitutional: Negative for chills and fever.   HENT: Negative for sinus pain and trouble swallowing.    Respiratory: Negative for cough and shortness of breath.    Cardiovascular: Negative for chest pain and leg swelling.   Gastrointestinal: Negative for abdominal pain, blood in stool, nausea and vomiting.   Genitourinary: Negative for dysuria and hematuria.   Musculoskeletal: Positive for arthralgias and joint swelling.   Skin: Positive for color change.   Neurological: Negative for numbness and headaches.   Psychiatric/Behavioral: Negative for confusion. The patient is nervous/anxious.      Objective:     Vital Signs (Most Recent):  Temp: 98.5 °F (36.9 °C) (19 1339)  Pulse: 77 (19 1800)  Resp: 20 (19 1800)  BP: (!) 150/77 (19 1800)  SpO2: 100 % (19 1800) Vital Signs (24h Range):  Temp:  [98.5 °F (36.9 °C)] 98.5 °F (36.9 °C)  Pulse:  [] 77  Resp:  [18-22] 20  SpO2:  [89 %-100 %] 100 %  BP: (119-167)/() 150/77     Weight: 74.4 kg (164 lb)  Body mass index is 32.03 kg/m².    Physical Exam   Constitutional: She is oriented to person, place, and time.   Mild distress with anxiety, pain   HENT:   Head: Normocephalic and atraumatic.   Eyes: EOM are normal. Pupils are equal, round, and reactive to light.   Neck: Normal range of motion. Neck supple.    Cardiovascular: Normal rate and regular rhythm.   Pulmonary/Chest: Effort normal and breath sounds normal. No respiratory distress.   Abdominal: Soft. Bowel sounds are normal. She exhibits no distension. There is no tenderness.   Musculoskeletal:   Left wrist and forearm, dressed and in sling, decreased rom left wrist   Neurological: She is alert and oriented to person, place, and time. No cranial nerve deficit.   Skin: Skin is warm.   Psychiatric: She has a normal mood and affect.   Vitals reviewed.        CRANIAL NERVES     CN III, IV, VI   Pupils are equal, round, and reactive to light.  Extraocular motions are normal.        Significant Labs:   BMP: No results for input(s): GLU, NA, K, CL, CO2, BUN, CREATININE, CALCIUM, MG in the last 48 hours.  CBC:   Recent Labs   Lab 01/21/19  1159   WBC 11.72   HGB 12.8   HCT 38.5   *       Significant Imaging: I have reviewed all pertinent imaging results/findings within the past 24 hours.    Assessment/Plan:     * Cellulitis of hand, left    Patient s/p I and d by ortho  Continue vanc and zosyn   Follow cx  Prn iv  And po pain meds  Check esr/crp and labs in am       Cat bite    Last tetanus shot about 4 and half years ago  Will give a tetanus shot       Hepatitis C antibody positive in blood    Outpatient follow up and viral load per primary care         VTE Risk Mitigation (From admission, onward)        Ordered     Place sequential compression device  Until discontinued      01/22/19 1323             Ayesha Mendiola MD  Department of Hospital Medicine   [facility]

## 2019-01-23 NOTE — SUBJECTIVE & OBJECTIVE
Past Medical History:   Diagnosis Date    Glaucoma        Past Surgical History:   Procedure Laterality Date    ADENOIDECTOMY      APPENDECTOMY  1998    Ruptured    HERNIA REPAIR Right 2003    [facility]    TONSILLECTOMY         Review of patient's allergies indicates:  No Known Allergies    No current facility-administered medications on file prior to encounter.      Current Outpatient Medications on File Prior to Encounter   Medication Sig    amoxicillin-clavulanate 875-125mg (AUGMENTIN) 875-125 mg per tablet Take 1 tablet by mouth 2 (two) times daily.    ascorbic acid, vitamin C, (VITAMIN C) 500 MG tablet Take 2,500 mg by mouth once daily.    HYDROcodone-acetaminophen (NORCO) 5-325 mg per tablet Take 1 tablet by mouth every 8 (eight) hours as needed for Pain.    naproxen sodium 220 mg Cap Take 1 tablet by mouth daily as needed.     Family History     Problem Relation (Age of Onset)    Heart disease Father (74)    Mental illness Brother    Parkinsonism Brother        Tobacco Use    Smoking status: Former Smoker     Types: Cigarettes     Last attempt to quit: 5/3/1992     Years since quittin.7    Smokeless tobacco: Never Used   Substance and Sexual Activity    Alcohol use: Yes     Alcohol/week: 2.4 oz     Types: 4 Shots of liquor per week     Frequency: 2-3 times a week     Drinks per session: 1 or 2     Binge frequency: Never    Drug use: No    Sexual activity: No     Review of Systems   Constitutional: Negative for chills and fever.   HENT: Negative for sinus pain and trouble swallowing.    Respiratory: Negative for cough and shortness of breath.    Cardiovascular: Negative for chest pain and leg swelling.   Gastrointestinal: Negative for abdominal pain, blood in stool, nausea and vomiting.   Genitourinary: Negative for dysuria and hematuria.   Musculoskeletal: Positive for arthralgias and joint swelling.   Skin: Positive for color change.   Neurological: Negative for numbness and headaches.    Psychiatric/Behavioral: Negative for confusion. The patient is nervous/anxious.      Objective:     Vital Signs (Most Recent):  Temp: 98.5 °F (36.9 °C) (01/22/19 1339)  Pulse: 77 (01/22/19 1800)  Resp: 20 (01/22/19 1800)  BP: (!) 150/77 (01/22/19 1800)  SpO2: 100 % (01/22/19 1800) Vital Signs (24h Range):  Temp:  [98.5 °F (36.9 °C)] 98.5 °F (36.9 °C)  Pulse:  [] 77  Resp:  [18-22] 20  SpO2:  [89 %-100 %] 100 %  BP: (119-167)/() 150/77     Weight: 74.4 kg (164 lb)  Body mass index is 32.03 kg/m².    Physical Exam   Constitutional: She is oriented to person, place, and time.   Mild distress with anxiety, pain   HENT:   Head: Normocephalic and atraumatic.   Eyes: EOM are normal. Pupils are equal, round, and reactive to light.   Neck: Normal range of motion. Neck supple.   Cardiovascular: Normal rate and regular rhythm.   Pulmonary/Chest: Effort normal and breath sounds normal. No respiratory distress.   Abdominal: Soft. Bowel sounds are normal. She exhibits no distension. There is no tenderness.   Musculoskeletal:   Left wrist and forearm, dressed and in sling, decreased rom left wrist   Neurological: She is alert and oriented to person, place, and time. No cranial nerve deficit.   Skin: Skin is warm.   Psychiatric: She has a normal mood and affect.   Vitals reviewed.        CRANIAL NERVES     CN III, IV, VI   Pupils are equal, round, and reactive to light.  Extraocular motions are normal.        Significant Labs:   BMP: No results for input(s): GLU, NA, K, CL, CO2, BUN, CREATININE, CALCIUM, MG in the last 48 hours.  CBC:   Recent Labs   Lab 01/21/19  1159   WBC 11.72   HGB 12.8   HCT 38.5   *       Significant Imaging: I have reviewed all pertinent imaging results/findings within the past 24 hours.

## 2019-01-23 NOTE — ASSESSMENT & PLAN NOTE
Patient s/p I and d by ortho  Continue vanc and zosyn   Follow cx  Prn iv  And po pain meds  Check esr/crp and labs in am

## 2019-01-23 NOTE — HPI
68 year old female with past significant medical history of recently diagnosed hep c positive antibody, was admitted from orthopedics office for iv antibiotics and I and d of left wrist.She was bitten by her cat about a week ago.Initially did feel some pain but started noticing swelling and redness the next day for which went to urgent care.According to her she was given a shot, not tetanus shot and was on augmentin.She saw her pcp yesterday and was made an appointment to see orthopedics and was admitted for I and d and iv antibiotics.I saw her post op in recovery and was complaining of pain in her wrist for which was getting iv fentanyl.She was started on vanc and zosyn and cx were done during procedure.

## 2019-01-23 NOTE — SUBJECTIVE & OBJECTIVE
Interval History: left wrist pain better than yesterday    Review of Systems   Constitutional: Negative for chills and fever.   HENT: Negative for sinus pain and trouble swallowing.    Respiratory: Negative for cough and shortness of breath.    Cardiovascular: Negative for chest pain and leg swelling.   Gastrointestinal: Negative for abdominal pain, blood in stool, nausea and vomiting.   Genitourinary: Negative for dysuria and hematuria.   Musculoskeletal: Positive for arthralgias and joint swelling.   Skin: Positive for color change.   Neurological: Negative for numbness and headaches.   Psychiatric/Behavioral: Negative for confusion. The patient is nervous/anxious.      Objective:     Vital Signs (Most Recent):  Temp: 98.3 °F (36.8 °C) (01/23/19 0800)  Pulse: 74 (01/23/19 0800)  Resp: 18 (01/23/19 0800)  BP: (!) 151/71 (01/23/19 0800)  SpO2: 98 % (01/23/19 0815) Vital Signs (24h Range):  Temp:  [98.1 °F (36.7 °C)-98.5 °F (36.9 °C)] 98.3 °F (36.8 °C)  Pulse:  [] 74  Resp:  [18-22] 18  SpO2:  [87 %-100 %] 98 %  BP: (119-167)/() 151/71     Weight: 74.4 kg (164 lb)  Body mass index is 30.99 kg/m².    Intake/Output Summary (Last 24 hours) at 1/23/2019 1003  Last data filed at 1/23/2019 0500  Gross per 24 hour   Intake 640 ml   Output 350 ml   Net 290 ml      Physical Exam   Constitutional: She is oriented to person, place, and time. No distress.   HENT:   Head: Normocephalic and atraumatic.   Eyes: EOM are normal. Pupils are equal, round, and reactive to light.   Neck: Normal range of motion. Neck supple.   Cardiovascular: Normal rate and regular rhythm.   Pulmonary/Chest: Effort normal and breath sounds normal. No respiratory distress.   Abdominal: Soft. Bowel sounds are normal. She exhibits no distension. There is no tenderness.   Musculoskeletal:   Left wrist and forearm, dressed and in sling, able to move left hand fingers   Neurological: She is alert and oriented to person, place, and time. No cranial  nerve deficit.   Skin: Skin is warm.   Psychiatric: She has a normal mood and affect.   Vitals reviewed.      Significant Labs:   BMP:   Recent Labs   Lab 01/23/19  0542   *      K 3.8      CO2 24   BUN 16   CREATININE 0.7   CALCIUM 8.8   MG 2.1     CBC:   Recent Labs   Lab 01/21/19  1159 01/23/19  0542   WBC 11.72 10.96   HGB 12.8 11.4*   HCT 38.5 34.8*   * 305       Significant Imaging: I have reviewed all pertinent imaging results/findings within the past 24 hours.

## 2019-01-23 NOTE — ASSESSMENT & PLAN NOTE
Based on history, pt had cellulitis of L forearm and possible septic arthritis of L wrist. No cell count available (have added on) and wrist culture NGTD (which could have been because she had been on abx). Needs coverage for pasturella and staph and other feline oral jaymie. On discharge, recommend changing antibiotics to oral doxycycline 100mg po bid and rifampin 300mg po bid. Have requested ID follow up in 2 weeks. Would like ESR/cRp repeated prior to this appointment. Low threshold for continuing abx for 6 weeks in case bone involved.

## 2019-01-23 NOTE — OP NOTE
DATE OF PROCEDURE:  01/22/2019.    PREOPERATIVE DIAGNOSIS:  Cellulitis and septic arthritis, left hand.    POSTOPERATIVE DIAGNOSIS:  Cellulitis and septic arthritis, left hand.    OPERATIVE PROCEDURE:  Surgical debridement and irrigation and insertion of a   drain.    PROCEDURE IN DETAIL:  The patient was taken to Operating Room, given axillary   block.  Following this, sedation was   given.  The left upper extremity was prepped and draped.  Tourniquet was   elevated to 250 mmHg.  A transverse incision was made at the wrist joint,   incised skin and subcutaneous tissues.  The dorsal sensory branch of the radial nerve was identified and protected.  There was considerable synovitis and   swelling and effusion in the radiocarpal joint.  The joint was opened.  Cultures   were taken for aerobic, anaerobic, fungal, TB and atypical TB cultures, Gram   stain, AFB stain, fungal stain and histology was sent to be performed on this   hand as well.  The radiocarpal joint was irrigated.  No evidence of swelling was   identified proximally or distally.  A gauze drain was inserted into the radiocarpal joint.  The skin was closed being careful not to sew the drain into the wound.  The tourniquet was released.  Tourniquet time was about 45   minutes.  Blood loss was negligible.  The patient was then placed in a short-arm   thumb spica splint and was admitted to the hospital to be followed by the   hospitalist and Dr. Evans.  Plan is to keep the patient for 1 to 2 days until we   could ascertain the cultures and she shows recovery.  The patient was placed on Zoszyn and Vancomycin.      DCF/IN  dd: 01/22/2019 16:12:38 (CST)  td: 01/22/2019 17:18:27 (CST)  Doc ID   #[id number]  Job ID #[id number]    CC:

## 2019-01-23 NOTE — PROGRESS NOTES
Post OP #1 Hep C positive.     S: More comfortable.  O:Sed rate>50 mm.  CRP >66.    WBC<.  On Zosyn and Vancomycin.  A: doing well Cultures not back, yet.  P: Plan to pull drain tomorrow. Hope to D/C tomorrow?         Dr. Evans

## 2019-01-23 NOTE — PLAN OF CARE
Problem: Fall Injury Risk  Goal: Absence of Fall and Fall-Related Injury  Outcome: Ongoing (interventions implemented as appropriate)  No significant events this shift. Received both Zosin and Vancomycin antibiotics this shift. Vanc-trough due before next (4th) dose. Remains free from fall, injury, and skin breakdown. Ambulates independently to bathroom. Positions self independently. Pain controlled with PO PRN meds. Neuro checks WDL. TEDs/SCDs maintained.Tolerating ordered diet. IV site WNL. Plan of care reviewed with patient and all questions answered. Bed low, locked w/ bed alarm on. Call light within reach. Purposeful rounding performed. No other complaints at this time.

## 2019-01-23 NOTE — OR NURSING
Very anxious and restless.  C/o pain to lt forearm -6, and c/o pain to rt shoulder-8.  States she has been having pain to her shoulder intermittently for quite some time.  Explained that when she had her axillary block and her arm was above her head that the position is what has affected her shoulder.  Encouraging her to deep breathe and to relax.

## 2019-01-23 NOTE — ASSESSMENT & PLAN NOTE
20% of people with HCV ab+ clear virus on own. If HCV RNA is positive, needs referral to hep C clinic for treatment. hcc screening q6mo imaging outpatient.

## 2019-01-23 NOTE — ASSESSMENT & PLAN NOTE
Patient s/p I and d by ortho of left wrist  Continue vanc and zosyn   Follow joint cx  Prn iv  And po pain meds  Monitor esr/crp  Will also get is recs for abx on discharge  Patient wants to go home in am

## 2019-01-23 NOTE — PLAN OF CARE
Discharge Planning:  Patient admitted on 1-22-19  LOS-day 1  Chart reviewed, Care plan discussed  Discussed care plan with treatment team,  attending Dr Mendiola  Consults following are: case mgt, Inf Dis., surgery  PCP updated in [facility]: yes  Pharmacy, updated in [facility]: [facility] [place]  DME at home: none  Current dispo: home tomorrow ?  Case management  to follow       01/23/19 1347   Discharge Assessment   Assessment Type Discharge Planning Assessment   Confirmed/corrected address and phone number on facesheet? Yes   Assessment information obtained from? Patient;Caregiver;Medical Record   Communicated expected length of stay with patient/caregiver yes   Prior to hospitilization cognitive status: Alert/Oriented   Prior to hospitalization functional status: Independent   Current cognitive status: Alert/Oriented   Current Functional Status: Assistive Equipment   Lives With alone   Able to Return to Prior Arrangements yes   Is patient able to care for self after discharge? Yes   Equipment Currently Used at Home none   Do you have any problems affording any of your prescribed medications? No   Is the patient taking medications as prescribed? yes   Does the patient have transportation home? Yes   Transportation Anticipated car, drives self;family or friend will provide   Discharge Plan A Home   DME Needed Upon Discharge  wound care supplies   Patient/Family in Agreement with Plan yes

## 2019-01-24 VITALS
DIASTOLIC BLOOD PRESSURE: 65 MMHG | OXYGEN SATURATION: 96 % | WEIGHT: 164 LBS | RESPIRATION RATE: 16 BRPM | TEMPERATURE: 99 F | BODY MASS INDEX: 30.96 KG/M2 | HEIGHT: 61 IN | SYSTOLIC BLOOD PRESSURE: 124 MMHG | HEART RATE: 79 BPM

## 2019-01-24 PROBLEM — E87.6 HYPOKALEMIA: Status: ACTIVE | Noted: 2019-01-24

## 2019-01-24 LAB
ANION GAP SERPL CALC-SCNC: 11 MMOL/L
BUN SERPL-MCNC: 10 MG/DL
CALCIUM SERPL-MCNC: 9.3 MG/DL
CHLORIDE SERPL-SCNC: 102 MMOL/L
CO2 SERPL-SCNC: 24 MMOL/L
CREAT SERPL-MCNC: 0.6 MG/DL
EST. GFR  (AFRICAN AMERICAN): >60 ML/MIN/1.73 M^2
EST. GFR  (NON AFRICAN AMERICAN): >60 ML/MIN/1.73 M^2
GLUCOSE SERPL-MCNC: 94 MG/DL
POTASSIUM SERPL-SCNC: 3.3 MMOL/L
SODIUM SERPL-SCNC: 137 MMOL/L
VANCOMYCIN TROUGH SERPL-MCNC: 13.4 UG/ML

## 2019-01-24 PROCEDURE — 63600175 PHARM REV CODE 636 W HCPCS: Mod: HCNC | Performed by: INTERNAL MEDICINE

## 2019-01-24 PROCEDURE — 36415 COLL VENOUS BLD VENIPUNCTURE: CPT | Mod: HCNC

## 2019-01-24 PROCEDURE — 94761 N-INVAS EAR/PLS OXIMETRY MLT: CPT | Mod: HCNC

## 2019-01-24 PROCEDURE — 99239 PR HOSPITAL DISCHARGE DAY,>30 MIN: ICD-10-PCS | Mod: HCNC,,, | Performed by: INTERNAL MEDICINE

## 2019-01-24 PROCEDURE — 25000003 PHARM REV CODE 250: Mod: HCNC | Performed by: INTERNAL MEDICINE

## 2019-01-24 PROCEDURE — 80202 ASSAY OF VANCOMYCIN: CPT | Mod: HCNC

## 2019-01-24 PROCEDURE — 99239 HOSP IP/OBS DSCHRG MGMT >30: CPT | Mod: HCNC,,, | Performed by: INTERNAL MEDICINE

## 2019-01-24 PROCEDURE — 80048 BASIC METABOLIC PNL TOTAL CA: CPT | Mod: HCNC

## 2019-01-24 RX ORDER — POTASSIUM CHLORIDE 20 MEQ/1
40 TABLET, EXTENDED RELEASE ORAL ONCE
Status: COMPLETED | OUTPATIENT
Start: 2019-01-24 | End: 2019-01-24

## 2019-01-24 RX ORDER — HYDROCODONE BITARTRATE AND ACETAMINOPHEN 5; 325 MG/1; MG/1
1 TABLET ORAL EVERY 6 HOURS PRN
Qty: 30 TABLET | Refills: 0 | Status: SHIPPED | OUTPATIENT
Start: 2019-01-24 | End: 2019-02-07

## 2019-01-24 RX ORDER — DOXYCYCLINE 100 MG/1
100 CAPSULE ORAL EVERY 12 HOURS
Qty: 42 CAPSULE | Refills: 0 | Status: SHIPPED | OUTPATIENT
Start: 2019-01-24 | End: 2019-02-14

## 2019-01-24 RX ORDER — RIFAMPIN 300 MG/1
300 CAPSULE ORAL EVERY 12 HOURS
Qty: 42 CAPSULE | Refills: 0 | Status: SHIPPED | OUTPATIENT
Start: 2019-01-24 | End: 2019-02-14

## 2019-01-24 RX ADMIN — PIPERACILLIN AND TAZOBACTAM 4.5 G: 4; .5 INJECTION, POWDER, LYOPHILIZED, FOR SOLUTION INTRAVENOUS; PARENTERAL at 12:01

## 2019-01-24 RX ADMIN — VANCOMYCIN HYDROCHLORIDE 1250 MG: 10 INJECTION, POWDER, LYOPHILIZED, FOR SOLUTION INTRAVENOUS at 04:01

## 2019-01-24 RX ADMIN — POTASSIUM CHLORIDE 40 MEQ: 1500 TABLET, EXTENDED RELEASE ORAL at 08:01

## 2019-01-24 RX ADMIN — PIPERACILLIN AND TAZOBACTAM 4.5 G: 4; .5 INJECTION, POWDER, LYOPHILIZED, FOR SOLUTION INTRAVENOUS; PARENTERAL at 08:01

## 2019-01-24 NOTE — PLAN OF CARE
Followup appointment scheduled with ID (Dr. Xavier) on Feb 19 at 9:30 am.    Labs to be ordered and scheduled by ID nurse, pt to be contacted with appointment information.     All information added to AVS and provided to pt on DC sheet and blue folder.  No further anticipated DC needs from CM perspective.

## 2019-01-24 NOTE — PLAN OF CARE
Problem: Adult Inpatient Plan of Care  Goal: Plan of Care Review  Outcome: Ongoing (interventions implemented as appropriate)  Plan of care reviewed with patient and all questions answer. Pt remains free from fall, injury, and skin breakdown. Pt neurovascular checks are intact. Patient ambulates to toilet without difficulty. Lt arm wrap with ace wrap and remains, CDI. Positions self independently. Purposeful rounding done. Patient has call light within reach, bed brakes lock, side rails up x2, bed in low position, and nonskid socks on. Patient lying in bed in no distress. Will continue to monitor.

## 2019-01-24 NOTE — PLAN OF CARE
DC sheet and blue folder provided to pt.  All questions answered, verbalized understanding.     Sister to provide transportation home.    No DC needs from CM perspective.       01/24/19 1127   Final Note   Assessment Type Final Discharge Note   Anticipated Discharge Disposition Home   What phone number can be called within the next 1-3 days to see how you are doing after discharge? [phone]   Hospital Follow Up  Appt(s) scheduled? Yes   Discharge plans and expectations educations in teach back method with documentation complete? Yes   Right Care Referral Info   Post Acute Recommendation No Care

## 2019-01-24 NOTE — PLAN OF CARE
Problem: Adult Inpatient Plan of Care  Goal: Plan of Care Review  Outcome: Ongoing (interventions implemented as appropriate)  Patient on RA.  Sats 96%. Pt. in no distress, will continue to monitor.

## 2019-01-24 NOTE — PROGRESS NOTES
Post OP 2 days  Comfortable. Afebrile, Cultures are negative.  Impression: doing well  Plan:  Today the drain was pulled. Minimal drainage.  Await decision of Dr. Mendiola.

## 2019-01-24 NOTE — PHYSICIAN QUERY
PT Name: Chely Kramer  MR #: [id number]     Physician Query Form - Diagnosis Clarification      CDS/: Hollie Blackmon               Contact information:[phone]    This form is a permanent document in the medical record.     Query Date: January 24, 2019    By submitting this query, we are merely seeking further clarification of documentation.  Please utilize your independent clinical judgment when addressing the question(s) below.     The medical record contains the following:      Findings Supporting Clinical Information Location in Medical Record     Septic arthritis , left hand   PREOPERATIVE DIAGNOSIS:  Cellulitis and septic arthritis, left hand.     POSTOPERATIVE DIAGNOSIS:  Cellulitis and septic arthritis, left hand    Awaiting cultures from wrist.Per physician report, there was inflammation around carpal joint.     Based on history, pt had cellulitis of L forearm and possible septic arthritis of L wrist. No cell count available (have added on) and wrist culture NGTD (which could have been because she had been on abx). Needs coverage for pasturella and staph and other feline oral jaymie. On discharge, recommend changing antibiotics to oral doxycycline 100mg po bid and rifampin 300mg po bid.          Orthopedic Surgery Op Note 1/24/2019          Hospital Medicine Progress Note 1/23/2019      Infectious Diseases Consults Notes 1/23/2019     Please clarify if the __Septic arthritis left hand_____is ruled in____________________ diagnosis has been:    [  ] Ruled In   [  ] Ruled In, Now Resolved   [  ] Ruled Out   [  ] Other/Clarification of findings (please specify):   [  ] Clinically insignificant     [  ] Clinically undetermined     Please document in your progress notes daily for the duration of treatment, until resolved, and include in your discharge summary.

## 2019-01-24 NOTE — NURSING
Patient in agreement with discharge. IV removed from right hand, catheter intact, placed gauze coban dressing. Voiding with no signs of urinary retention. Patient tolerates PO intake without nausea or vomiting. Pain is well controlled. Dressing to left hand C/D/I. Patient is ambulating without difficulty. Reviewed all discharge instructions and patient verbalized understanding.

## 2019-01-24 NOTE — DISCHARGE SUMMARY
[facility] Hospital Medicine  Discharge Summary      Patient Name: Chely Kramer  MRN: [id number]  Admission Date: 1/22/2019  Hospital Length of Stay: 2 days  Discharge Date and Time: 1/24/2019 12:24 PM  Attending Physician: No att. providers found   Discharging Provider: Osvaldo Marinelli MD  Primary Care Provider: KIA Herrera MD      HPI:   68 year old female with past significant medical history of recently diagnosed hep c positive antibody, was admitted from orthopedics office for iv antibiotics and I and d of left wrist.She was bitten by her cat about a week ago.Initially did feel some pain but started noticing swelling and redness the next day for which went to urgent care.According to her she was given a shot, not tetanus shot and was on augmentin.She saw her pcp yesterday and was made an appointment to see orthopedics and was admitted for I and d and iv antibiotics.I saw her post op in recovery and was complaining of pain in her wrist for which was getting iv fentanyl.She was started on vanc and zosyn and cx were done during procedure.    Procedure(s) (LRB):  INCISION AND DRAINAGE, HAND (ADD ON ) (Left)      Hospital Course:   Per physician report, there was inflammation around carpal joint.Her drain was removed.Cultures from procedure were negative.Id was consulted and recommended doxycycline and rifampin with id follow up in 2 weeks with inflammatory markers.Esr improved from 72 (1/21) to 50(1/23).Crp was 66.33.Joint cell count was ordered but did not see report.Her hepatitis antibody was positive but qualitative hcv was negative.She was discharged on oral pain meds and follow with ortho next week.She was also given tetanus shot.     Consults:   Consults (From admission, onward)        Status Ordering Provider     Inpatient consult to Infectious Diseases  Once     Provider:  Sandra Xavier MD    Completed OSVALDO MARINELLI          Hypokalemia    replace       Hepatitis C antibody positive  in blood    hcv rna negative         Final Active Diagnoses:    Diagnosis Date Noted POA    PRINCIPAL PROBLEM:  Cellulitis of wrist [L03.119] 01/22/2019 Yes    Hypokalemia [E87.6] 01/24/2019 Yes    Cat bite [W55.01XA] 01/22/2019 Yes    Hepatitis C antibody positive in blood [R76.8] 01/18/2019 Yes      Problems Resolved During this Admission:       Discharged Condition: stable    Disposition: Home or Self Care    Follow Up:  Follow-up Information     Schedule an appointment as soon as possible for a visit with KIA Herrera MD.    Specialty:  Internal Medicine  Why:  For Hospital Followup Within 1- 2 weeks.  Contact information:  [address]  [address]  [facility] [address]  [phone]             Poppy Markwell, MD. Go on 2/19/2019.    Specialty:  Infectious Diseases  Why:   at 9:30 am - patient needs ESR/cRp repeated prior to appointment - nurse will call you to schedule lab appointment  Contact information:  [address]  [facility] [address]  [phone]             Sameer Evans MD In 1 week.    Specialty:  Orthopedic Surgery  Contact information:  [address]  [facility] [address]  [phone]             Please follow up.    Contact information:  crp and esr before id appointment on 2/19/19.               Patient Instructions:      Diet Adult Regular     Activity as tolerated       Significant Diagnostic Studies:   Lab Results   Component Value Date    WBC 10.96 01/23/2019    HGB 11.4 (L) 01/23/2019    HCT 34.8 (L) 01/23/2019    MCV 95 01/23/2019     01/23/2019     BMP  Lab Results   Component Value Date     01/24/2019    K 3.3 (L) 01/24/2019     01/24/2019    CO2 24 01/24/2019    BUN 10 01/24/2019    CREATININE 0.6 01/24/2019    CALCIUM 9.3 01/24/2019    ANIONGAP 11 01/24/2019    ESTGFRAFRICA >60 01/24/2019    EGFRNONAA >60 01/24/2019         Pending Diagnostic Studies:     None         Medications:  Reconciled Home Medications:      Medication List      START  taking these medications    doxycycline 100 MG Cap  Commonly known as:  VIBRAMYCIN  Take 1 capsule (100 mg total) by mouth every 12 (twelve) hours. for 21 days     rifAMpin 300 MG capsule  Commonly known as:  RIFADIN  Take 1 capsule (300 mg total) by mouth every 12 (twelve) hours. for 21 days        CHANGE how you take these medications    HYDROcodone-acetaminophen 5-325 mg per tablet  Commonly known as:  NORCO  Take 1 tablet by mouth every 6 (six) hours as needed for Pain.  What changed:  when to take this        CONTINUE taking these medications    ascorbic acid (vitamin C) 500 MG tablet  Commonly known as:  VITAMIN C  Take 2,500 mg by mouth once daily.        STOP taking these medications    amoxicillin-clavulanate 875-125mg 875-125 mg per tablet  Commonly known as:  AUGMENTIN     naproxen sodium 220 mg Cap            Indwelling Lines/Drains at time of discharge:   Lines/Drains/Airways          None          Time spent on the discharge of patient: 35  minutes  Patient was seen and examined on the date of discharge and determined to be suitable for discharge.         Ayesha Mendiola MD  Department of Hospital Medicine  [facility]

## 2019-01-26 LAB — BACTERIA SPEC AEROBE CULT: NO GROWTH

## 2019-01-28 ENCOUNTER — OFFICE VISIT (OUTPATIENT)
Dept: INTERNAL MEDICINE | Facility: CLINIC | Age: 69
End: 2019-01-28
Attending: INTERNAL MEDICINE
Payer: MEDICARE

## 2019-01-28 VITALS
WEIGHT: 160 LBS | HEIGHT: 61 IN | SYSTOLIC BLOOD PRESSURE: 120 MMHG | HEART RATE: 102 BPM | DIASTOLIC BLOOD PRESSURE: 68 MMHG | OXYGEN SATURATION: 98 % | BODY MASS INDEX: 30.21 KG/M2

## 2019-01-28 DIAGNOSIS — R76.8 HEPATITIS C ANTIBODY POSITIVE IN BLOOD: ICD-10-CM
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
## 2019-01-28 DIAGNOSIS — L03.119 CELLULITIS OF WRIST: ICD-10-CM

## 2019-01-28 DIAGNOSIS — E87.6 HYPOKALEMIA: ICD-10-CM

## 2019-01-28 LAB
BACTERIA THROAT CULT: NO GROWTH
GRAM STN SPEC: NORMAL
GRAM STN SPEC: NORMAL

## 2019-01-28 PROCEDURE — 99214 OFFICE O/P EST MOD 30 MIN: CPT | Mod: S$GLB,,, | Performed by: INTERNAL MEDICINE

## 2019-01-28 PROCEDURE — 99214 PR OFFICE/OUTPT VISIT, EST, LEVL IV, 30-39 MIN: ICD-10-PCS | Mod: S$GLB,,, | Performed by: INTERNAL MEDICINE

## 2019-01-28 RX ORDER — FLUCONAZOLE 150 MG/1
150 TABLET ORAL DAILY PRN
Qty: 3 TABLET | Refills: 0 | Status: SHIPPED | OUTPATIENT
Start: 2019-01-28 | End: 2019-01-29

## 2019-01-28 NOTE — PROGRESS NOTES
Subjective:       Patient ID: Chely Kramer is a 68 y.o. female.    Chief Complaint: Follow-up (1 week); Vaginitis; and Diarrhea    Hospital Follow-up:    Admit date: 1/22/19  Discharge date: 1/24/19  Hospital D/c for: Loyda bite causing severe hand infection.    Patient was called within 2 days of hospital discharge and made a follow-up appt with me within 7 calender days of discharge.       Family and/or Caretaker present at visit?  No  Diagnostic tests reviewed/disposition:  Yes.  Disease/illness education: Yes.   Home health/community services discussion/referrals: Doesn't need home health at this time.  Establishment or re-establishment of referral orders for community resources: .   Discussion with other health care providers: Note in EPIC for review by other healthcare providers.    Still in hard cast. Appt with Dr. Evans tomorrow.  Much better. Pain almost gone.         Diarrhea    This is a new problem. The current episode started in the past 7 days.     Review of Systems   Constitutional: Negative.    Respiratory: Negative for shortness of breath.    Cardiovascular: Negative for chest pain.   Gastrointestinal: Positive for diarrhea.   Genitourinary: Positive for vaginal discharge.   Musculoskeletal: Positive for joint swelling.       Objective:      Physical Exam   Constitutional: She appears well-developed and well-nourished.   HENT:   Head: Normocephalic.   Eyes: Pupils are equal, round, and reactive to light.   Cardiovascular: Normal rate, regular rhythm and normal heart sounds. Exam reveals no friction rub.   Pulmonary/Chest: Effort normal.   Musculoskeletal:   Right hand/wrist in a cast.   Neurological: She is alert.       Assessment:       1. Cat bite, initial encounter    2. Hepatitis C antibody positive in blood    3. Cellulitis of wrist    4. Hypokalemia        Plan:       Per orders and D/C instructions.  Cont Abx. F/u with Dr. Evans and ID.  Hep C viral load neg.

## 2019-01-29 NOTE — PHYSICIAN QUERY
PT Name: Chely Kramer  MR #: [id number]     Physician Query Form - Diagnosis Clarification      CDS/: Hollie Blackmon               Contact information:[phone]    This form is a permanent document in the medical record.     Query Date: January 29, 2019    By submitting this query, we are merely seeking further clarification of documentation.  Please utilize your independent clinical judgment when addressing the question(s) below.     The medical record contains the following:      Findings Supporting Clinical Information Location in Medical Record     Septic arthritis , left hand   PREOPERATIVE DIAGNOSIS:  Cellulitis and septic arthritis, left hand.     POSTOPERATIVE DIAGNOSIS:  Cellulitis and septic arthritis, left hand    Awaiting cultures from wrist.Per physician report, there was inflammation around carpal joint.     Based on history, pt had cellulitis of L forearm and possible septic arthritis of L wrist. No cell count available (have added on) and wrist culture NGTD (which could have been because she had been on abx). Needs coverage for pasturella and staph and other feline oral jaymie. On discharge, recommend changing antibiotics to oral doxycycline 100mg po bid and rifampin 300mg po bid.     FINAL PATHOLOGIC DIAGNOSIS  Left wrist synovium, debridement:  - Acute fibrinopurulent exudate consistent with septic joint/abscess formation     Orthopedic Surgery Op Note 1/24/2019          Hospital Medicine Progress Note 1/23/2019      Infectious Diseases Consults Notes 1/23/2019                  Pathology  Results 1/28/2019     Please clarify if the __Septic arthritis left hand_____is ruled in____________________ diagnosis has been:    [  ] Ruled In   [  ] Ruled In, Now Resolved   [  ] Ruled Out   [  ] Other/Clarification of findings (please specify):   [  ] Clinically insignificant     [  ] Clinically undetermined     Please document in your progress notes daily for the duration of treatment, until resolved,  and include in your discharge summary.

## 2019-02-01 LAB — BACTERIA SPEC ANAEROBE CULT: NORMAL

## 2019-02-05 ENCOUNTER — TELEPHONE (OUTPATIENT)
Dept: INFECTIOUS DISEASES | Facility: CLINIC | Age: 69
End: 2019-02-05

## 2019-02-05 NOTE — TELEPHONE ENCOUNTER
----- Message from Becky Saleh sent at 2/5/2019  8:36 AM CST -----  Contact: Chely     tel:     [phone] / [phone] cell   Needs Advice    Reason for call:  Pt. Is inquiring if this is a mistake that she is to see  and Dr. Xavier?   Asking when did she see Dr. Griffith since she  Doesn't remember meeting him and does she keep this appt. Or not?             Communication Preference: phone     Additional Information:   Pls call.

## 2019-02-07 ENCOUNTER — OFFICE VISIT (OUTPATIENT)
Dept: INFECTIOUS DISEASES | Facility: CLINIC | Age: 69
End: 2019-02-07
Payer: MEDICARE

## 2019-02-07 VITALS
WEIGHT: 164 LBS | SYSTOLIC BLOOD PRESSURE: 135 MMHG | BODY MASS INDEX: 30.96 KG/M2 | HEIGHT: 61 IN | TEMPERATURE: 100 F | DIASTOLIC BLOOD PRESSURE: 76 MMHG | HEART RATE: 91 BPM

## 2019-02-07 DIAGNOSIS — R76.8 HEPATITIS C ANTIBODY POSITIVE IN BLOOD: ICD-10-CM

## 2019-02-07 DIAGNOSIS — L03.119 CELLULITIS OF WRIST: Primary | ICD-10-CM

## 2019-02-07 PROCEDURE — 99999 PR PBB SHADOW E&M-EST. PATIENT-LVL III: CPT | Mod: PBBFAC,HCNC,, | Performed by: INTERNAL MEDICINE

## 2019-02-07 PROCEDURE — 99213 PR OFFICE/OUTPT VISIT, EST, LEVL III, 20-29 MIN: ICD-10-PCS | Mod: HCNC,S$GLB,, | Performed by: INTERNAL MEDICINE

## 2019-02-07 PROCEDURE — 99999 PR PBB SHADOW E&M-EST. PATIENT-LVL III: ICD-10-PCS | Mod: PBBFAC,HCNC,, | Performed by: INTERNAL MEDICINE

## 2019-02-07 PROCEDURE — 1101F PR PT FALLS ASSESS DOC 0-1 FALLS W/OUT INJ PAST YR: ICD-10-PCS | Mod: HCNC,CPTII,S$GLB, | Performed by: INTERNAL MEDICINE

## 2019-02-07 PROCEDURE — 1101F PT FALLS ASSESS-DOCD LE1/YR: CPT | Mod: HCNC,CPTII,S$GLB, | Performed by: INTERNAL MEDICINE

## 2019-02-07 PROCEDURE — 99213 OFFICE O/P EST LOW 20 MIN: CPT | Mod: HCNC,S$GLB,, | Performed by: INTERNAL MEDICINE

## 2019-02-07 NOTE — PROGRESS NOTES
Subjective:      Patient ID: Chely Kramer is a 68 y.o. female.    Chief Complaint:Follow-up      History of Present Illness    Presents in follow-up from hospital.  She has been taking doxycycline and rifampin for cat bite.  Stitches have been removed since incision and drainage. Cultures grew only propionibacterium in broth.  Her hand is currently in a splint.        Review of Systems   Constitution: Negative for chills, decreased appetite, fever, weakness, malaise/fatigue, night sweats, weight gain and weight loss.   HENT: Negative for congestion, ear pain, hearing loss, hoarse voice, sore throat and tinnitus.    Eyes: Negative for blurred vision, redness and visual disturbance.   Cardiovascular: Negative for chest pain, leg swelling and palpitations.   Respiratory: Negative for cough, hemoptysis, shortness of breath and sputum production.    Hematologic/Lymphatic: Negative for adenopathy. Does not bruise/bleed easily.   Skin: Negative for dry skin, itching, rash and suspicious lesions.   Musculoskeletal: Negative for back pain, joint pain, myalgias and neck pain.   Gastrointestinal: Positive for abdominal pain. Negative for constipation, diarrhea, heartburn, nausea and vomiting.   Genitourinary: Negative for dysuria, flank pain, frequency, hematuria, hesitancy and urgency.   Neurological: Negative for dizziness, headaches, numbness and paresthesias.   Psychiatric/Behavioral: Negative for depression and memory loss. The patient has insomnia. The patient is not nervous/anxious.      Objective:   Physical Exam   Constitutional: She is oriented to person, place, and time. She appears well-developed and well-nourished.   HENT:   Head: Normocephalic and atraumatic.   Eyes: EOM are normal. Pupils are equal, round, and reactive to light.   Cardiovascular: Normal rate and regular rhythm.   Pulmonary/Chest: Effort normal.   Musculoskeletal:        Left hand: She exhibits no tenderness and no deformity.         Hands:  Neurological: She is alert and oriented to person, place, and time.   Skin: Skin is warm and dry. No erythema.   Nursing note and vitals reviewed.                 Assessment:       1. Cellulitis of wrist    2. Hepatitis C antibody positive in blood          Plan:       Check ESR and CRP today.  Continue doxycycline and rifampin.  Instructed patient to present if erythema and tenderness of occur.  If ESR and CRP are elevated, she will have a repeat ESR and CRP next week.  If those are elevated, she will have MRI of the left wrist to determine if osteomyelitis is present.  At this time her wrist is not appear to be infected, and her wounds appear to be healing well.

## 2019-03-07 ENCOUNTER — OFFICE VISIT (OUTPATIENT)
Dept: INFECTIOUS DISEASES | Facility: CLINIC | Age: 69
End: 2019-03-07
Payer: MEDICARE

## 2019-03-07 VITALS
BODY MASS INDEX: 31.39 KG/M2 | TEMPERATURE: 98 F | WEIGHT: 166.25 LBS | SYSTOLIC BLOOD PRESSURE: 149 MMHG | HEIGHT: 61 IN | DIASTOLIC BLOOD PRESSURE: 85 MMHG | HEART RATE: 77 BPM
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
## 2019-03-07 DIAGNOSIS — L03.119 CELLULITIS OF WRIST: Primary | ICD-10-CM
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
## 2019-03-07 PROCEDURE — 1101F PT FALLS ASSESS-DOCD LE1/YR: CPT | Mod: HCNC,CPTII,S$GLB, | Performed by: INTERNAL MEDICINE

## 2019-03-07 PROCEDURE — 99213 PR OFFICE/OUTPT VISIT, EST, LEVL III, 20-29 MIN: ICD-10-PCS | Mod: HCNC,S$GLB,, | Performed by: INTERNAL MEDICINE

## 2019-03-07 PROCEDURE — 99213 OFFICE O/P EST LOW 20 MIN: CPT | Mod: HCNC,S$GLB,, | Performed by: INTERNAL MEDICINE

## 2019-03-07 PROCEDURE — 99999 PR PBB SHADOW E&M-EST. PATIENT-LVL III: CPT | Mod: PBBFAC,HCNC,, | Performed by: INTERNAL MEDICINE

## 2019-03-07 PROCEDURE — 1101F PR PT FALLS ASSESS DOC 0-1 FALLS W/OUT INJ PAST YR: ICD-10-PCS | Mod: HCNC,CPTII,S$GLB, | Performed by: INTERNAL MEDICINE

## 2019-03-07 PROCEDURE — 99999 PR PBB SHADOW E&M-EST. PATIENT-LVL III: ICD-10-PCS | Mod: PBBFAC,HCNC,, | Performed by: INTERNAL MEDICINE

## 2019-03-07 RX ORDER — LOTEPREDNOL ETABONATE 2 MG/ML
1 SUSPENSION/ DROPS OPHTHALMIC 4 TIMES DAILY
Status: ON HOLD | COMMUNITY
End: 2024-02-25

## 2019-03-07 NOTE — PROGRESS NOTES
Subjective:      Patient ID: Chely Kramer is a 68 y.o. female.    Chief Complaint:Follow-up      History of Present Illness    Patient states that her left wrist is stiff, but without pain.  She [place] able [place]end during [place].  She has occasional tingling in between her 1st and 2nd fingers, but this is not continuous, nor painful.    Review of Systems   Constitution: Negative for chills, decreased appetite, fever, weakness, malaise/fatigue, night sweats, weight gain and weight loss.   HENT: Negative for congestion, ear pain, hearing loss, hoarse voice, sore throat and tinnitus.    Eyes: Negative for blurred vision, redness and visual disturbance.   Cardiovascular: Negative for chest pain, leg swelling and palpitations.   Respiratory: Negative for cough, hemoptysis, shortness of breath and sputum production.    Hematologic/Lymphatic: Negative for adenopathy. Does not bruise/bleed easily.   Skin: Negative for dry skin, itching, rash and suspicious lesions.   Musculoskeletal: Negative for back pain, joint pain, myalgias and neck pain.   Gastrointestinal: Negative for abdominal pain, constipation, diarrhea, heartburn, nausea and vomiting.   Genitourinary: Negative for dysuria, flank pain, frequency, hematuria, hesitancy and urgency.   Neurological: Negative for dizziness, headaches, numbness and paresthesias.   Psychiatric/Behavioral: Negative for depression and memory loss. The patient has insomnia. The patient is not nervous/anxious.      Objective:   Physical Exam   Constitutional: She is oriented to person, place, and time. She appears well-developed and well-nourished.   HENT:   Head: Normocephalic and atraumatic.   Eyes: EOM are normal. Pupils are equal, round, and reactive to light.   Cardiovascular: Normal rate and regular rhythm.   Pulmonary/Chest: Effort normal.   Musculoskeletal:        Left hand: She exhibits no tenderness and no deformity.         Hands:  Neurological: She is alert and oriented to person, place, and time.   Skin: Skin is warm and dry. No erythema.   Nursing note and vitals reviewed.        Assessment:       1. Cellulitis of wrist    2. Cat bite, subsequent encounter          Plan:       Check ESR and CRP today.  Discussed meaning of ESR and CRP.  If they are normal today, she will not require follow-up.  If they are abnormal, she will be followed closely for resolution, or consideration for treatment.

## 2020-01-13 ENCOUNTER — OFFICE VISIT (OUTPATIENT)
Dept: INTERNAL MEDICINE | Facility: CLINIC | Age: 70
End: 2020-01-13
Attending: INTERNAL MEDICINE
Payer: MEDICARE

## 2020-01-13 VITALS
OXYGEN SATURATION: 97 % | DIASTOLIC BLOOD PRESSURE: 62 MMHG | HEART RATE: 78 BPM | SYSTOLIC BLOOD PRESSURE: 122 MMHG | HEIGHT: 61 IN | WEIGHT: 166 LBS | BODY MASS INDEX: 31.34 KG/M2

## 2020-01-13 DIAGNOSIS — Z00.00 ROUTINE ADULT HEALTH MAINTENANCE: ICD-10-CM

## 2020-01-13 DIAGNOSIS — Z12.39 BREAST CANCER SCREENING: Primary | ICD-10-CM

## 2020-01-13 DIAGNOSIS — Z13.31 SCREENING FOR DEPRESSION: ICD-10-CM

## 2020-01-13 DIAGNOSIS — Z13.39 SCREENING FOR ALCOHOLISM: ICD-10-CM

## 2020-01-13 DIAGNOSIS — H00.011 HORDEOLUM EXTERNUM OF RIGHT UPPER EYELID: ICD-10-CM

## 2020-01-13 DIAGNOSIS — Z13.89 SCREENING FOR OBESITY: ICD-10-CM

## 2020-01-13 DIAGNOSIS — Z12.31 ENCOUNTER FOR SCREENING MAMMOGRAM FOR MALIGNANT NEOPLASM OF BREAST: ICD-10-CM

## 2020-01-13 PROCEDURE — 1160F RVW MEDS BY RX/DR IN RCRD: CPT | Mod: S$GLB,,, | Performed by: INTERNAL MEDICINE

## 2020-01-13 PROCEDURE — G0444 DEPRESSION SCREEN ANNUAL: HCPCS | Mod: 59,S$GLB,, | Performed by: INTERNAL MEDICINE

## 2020-01-13 PROCEDURE — 1159F PR MEDICATION LIST DOCUMENTED IN MEDICAL RECORD: ICD-10-PCS | Mod: S$GLB,,, | Performed by: INTERNAL MEDICINE

## 2020-01-13 PROCEDURE — 1159F MED LIST DOCD IN RCRD: CPT | Mod: S$GLB,,, | Performed by: INTERNAL MEDICINE

## 2020-01-13 PROCEDURE — G0444 PR DEPRESSION SCREENING: ICD-10-PCS | Mod: 59,S$GLB,, | Performed by: INTERNAL MEDICINE

## 2020-01-13 PROCEDURE — 99397 PER PM REEVAL EST PAT 65+ YR: CPT | Mod: 25,S$GLB,, | Performed by: INTERNAL MEDICINE

## 2020-01-13 PROCEDURE — 1160F PR REVIEW ALL MEDS BY PRESCRIBER/CLIN PHARMACIST DOCUMENTED: ICD-10-PCS | Mod: S$GLB,,, | Performed by: INTERNAL MEDICINE

## 2020-01-13 PROCEDURE — 99397 PR PREVENTIVE VISIT,EST,65 & OVER: ICD-10-PCS | Mod: 25,S$GLB,, | Performed by: INTERNAL MEDICINE

## 2020-01-13 NOTE — PROGRESS NOTES
Subjective:       Patient ID: Chely Kramer is a 69 y.o. female.    Chief Complaint: Annual Exam and Eye Problem (right eye)    She developed a stye of her right upper lid about 6 weeks ago.  It has improved, but is still visible.    Annual Wellness Exam:    Cognitive Impairment: None    Mental Conditions: None    Depression Risk Factors: None    Functional Ability:    Steady gait: Yes  Self reliant: Yes   Safe home: Yes  Hearing Difficulties: No   Vision Difficulties: No    Physical Impairment: None    Living Will: See Patient Demographics    Functional assessment:  Able to do all ADL's  Fall Risk: Low  Urinary incontinence: No  Energy level: Good  Mental well-being: Good    HEIDS Measure screening dates:  BMI: See Vital signs      DEXA:               See Health Maintenance Report  colon screen:    See Health Maintenance Report      Mammogram:   See Health Maintenance Report                   Glaucoma screen:  per Optho                    Vaccines: See Health Maintenance Report  Flu:            Pneumovax:  Shingrix:       Pain management: Denies pain       The patient's current health status is: Good   Patient was educated on all medical problems. See A/P from note dated today.    Screening: The patient was screened for depression with the PHQ2 questionnaire and possible health consequences were discussed with the patient, who understands (15 minutes spent). The patient was screened for the misuse of alcohol, by asking the number of drinks per average week, and if pt has had more than 4 drinks (more than 3 for women and elderly) in 1 day within the past year. The health and legal consequences of misuse were discussed (15 minutes spent). The patient was screened for obesity (BMI>30), If the current BMI > 30, then the possible consequences of obesity, as well as the benefits of diet, exercise, and weight loss were discussed. Any behavioral risks were identified, and methods to achieve appropriate treatment goals were  discussed (15 minutes spent).                                 Review of Systems   Constitutional: Negative.    Respiratory: Negative for shortness of breath.    Cardiovascular: Negative for chest pain.   Psychiatric/Behavioral: Negative for dysphoric mood.       Objective:      Physical Exam   Constitutional: She appears well-developed and well-nourished.   HENT:   Head: Normocephalic.   Eyes: Pupils are equal, round, and reactive to light. Right eye exhibits hordeolum.       Cardiovascular: Normal rate, regular rhythm and normal heart sounds. Exam reveals no friction rub.   Pulmonary/Chest: Effort normal.   Neurological: She is alert.       Assessment:       1. Breast cancer screening    2. Encounter for screening mammogram for malignant neoplasm of breast     3. Routine adult health maintenance    4. Screening for alcoholism    5. Screening for obesity    6. Screening for depression    7. Hordeolum externum of right upper eyelid        Plan:       Per orders and D/C instructions.     she will try warm compresses to the.  I.  If it does not improve she will follow up with Ophthalmology.    Screening: The patient was screened for depression with the PHQ2 questionnaire and possible health consequences were discussed with the patient, who understands (15 minutes spent). The patient was screened for the misuse of alcohol, by asking the number of drinks per average week, and if pt has had more than 4 drinks (more than 3 for women and elderly) in 1 day within the past year. The health and legal consequences of misuse were discussed (15 minutes spent). The patient was screened for obesity (BMI>30), If the current BMI > 30, then the possible consequences of obesity, as well as the benefits of diet, exercise, and weight loss were discussed. Any behavioral risks were identified, and methods to achieve appropriate treatment goals were discussed (15 minutes spent).

## 2020-01-13 NOTE — PATIENT INSTRUCTIONS
Tips for Healthy Living and Routine Preventative Care - 2020                                                            (These guidelines are intended for healthy adults)      1. Exercise  Exercise aerobically with a target heart rate of (220-age) x 0.8  Exercise 30-45 minutes on most days of the week    2. Diet and Supplements- All supplements can be obtained through a varied, healthy diet   Calcium: 1,000 - 1,200 mg each day   8 oz milk, Calcium fortified O.J., or Yogurt = 300 mg, 1oz of cheese =100-200 mg  Vitamin D: 800 iu each day- Can probably be obtained by 30 min. of direct sunlight    each day             3 oz. [place] = 800 iu,  3 oz. Tuna =150 iu, Milk or fortified O.J. = 120 iu  Fish oil: 1-2 grams each day or about 840 mg of EPA and DHA (Omega-3 fatty acids) each day             3 oz. [place]=2 grams,  3 oz. Tuna=1.3 grams,  3 oz. drained light Tuna= 0.25 grams  Folic acid 800 mcg each day for all women planning or capable of pregnancy    3. Lifestyle  Alcohol: 1 drink = 12 oz. domestic beer, 4 oz. wine, or 1 oz. hard (80 proof) liquor             Males: </= 14 drinks per week with no more than 4 in any one day             Females: </= 7 drinks per week with no more than 3 in any one day  Salt: 1.2 - 3 grams of Sodium each day.  Tobacco: Dont smoke, or quit smoking (discuss with your doctor)  Depression: If you feel depressed discuss with your doctor  Weight: Maintain a healthy body weight. Stay within 10% of:             Males: 106 lbs. + 6 lbs per inch height above 5 feet             Females: 100 lbs + 5 lbs per inch height above 5 feet    4. Routine tests  Blood pressure check at each visit, or at least once each year  HIV screening (one time) if less than 65 years old  Hepatitis C screen (one time) if born between 1945 and 1965  Cholesterol screening every 3 years starting at age 21  Glucose check every 2-3 years starting at age 45  TSH (thyroid screen) every 2 years starting at age 50  Colonoscopy  at age 50, and repeat every 10 years until age 75  Vision screen at age 65    Females:  Pap smear every three years starting at age 21                  Stop screening at age 65 if past 3 pap smears were normal                  No screening for women who have had a hysterectomy with removal of cervix  Mammogram every 1-2 years starting at age 40 (or age 50) until age 75.  Bone density scan at about age 65      Males:  Consider PSA screening annually at age 50, age 45 for  Americans, until age 75                 5. Immunizations  Influenza vaccine every year in the fall, especially if >50 or with a chronic disease  Tetnus/Diptheria/Pertusis (Tdap) vaccine once (after the age of 18), then Tetnus/Diptheria (Td) vaccine every 10 years  Shingles (Shingrix) vaccine after age 50 and get a 2nd dose after 2-6 months  Pneumonia vaccine at age 65. 2nd Pneumonia vaccine at least 1 year later (1st should be Prevnar-13, and 2nd should be Pneumovax-23).

## 2020-01-28 ENCOUNTER — HOSPITAL ENCOUNTER (OUTPATIENT)
Dept: RADIOLOGY | Facility: OTHER | Age: 70
Discharge: HOME | End: 2020-01-28
Attending: INTERNAL MEDICINE
Payer: MEDICARE

## 2020-01-28 DIAGNOSIS — Z12.39 BREAST CANCER SCREENING: ICD-10-CM

## 2020-01-28 DIAGNOSIS — Z12.31 ENCOUNTER FOR SCREENING MAMMOGRAM FOR MALIGNANT NEOPLASM OF BREAST: ICD-10-CM

## 2020-01-28 PROCEDURE — 77063 MAMMO DIGITAL SCREENING BILAT WITH TOMOSYNTHESIS_CAD: ICD-10-PCS | Mod: 26,HCNC,, | Performed by: RADIOLOGY

## 2020-01-28 PROCEDURE — 77067 SCR MAMMO BI INCL CAD: CPT | Mod: TC,HCNC

## 2020-01-28 PROCEDURE — 77067 MAMMO DIGITAL SCREENING BILAT WITH TOMOSYNTHESIS_CAD: ICD-10-PCS | Mod: 26,HCNC,, | Performed by: RADIOLOGY

## 2020-01-28 PROCEDURE — 77067 SCR MAMMO BI INCL CAD: CPT | Mod: 26,HCNC,, | Performed by: RADIOLOGY

## 2020-01-28 PROCEDURE — 77063 BREAST TOMOSYNTHESIS BI: CPT | Mod: 26,HCNC,, | Performed by: RADIOLOGY

## 2020-06-22 ENCOUNTER — PES CALL (OUTPATIENT)
Dept: ADMINISTRATIVE | Facility: CLINIC | Age: 70
End: 2020-06-22

## 2020-11-16 ENCOUNTER — PES CALL (OUTPATIENT)
Dept: ADMINISTRATIVE | Facility: CLINIC | Age: 70
End: 2020-11-16

## 2021-01-13 ENCOUNTER — OFFICE VISIT (OUTPATIENT)
Dept: INTERNAL MEDICINE | Facility: CLINIC | Age: 71
End: 2021-01-13
Attending: INTERNAL MEDICINE
Payer: MEDICARE

## 2021-01-13 ENCOUNTER — LAB VISIT (OUTPATIENT)
Dept: LAB | Facility: OTHER | Age: 71
End: 2021-01-13
Attending: INTERNAL MEDICINE
Payer: MEDICARE

## 2021-01-13 VITALS
DIASTOLIC BLOOD PRESSURE: 70 MMHG | SYSTOLIC BLOOD PRESSURE: 136 MMHG | HEIGHT: 61 IN | WEIGHT: 167 LBS | HEART RATE: 96 BPM | OXYGEN SATURATION: 96 % | BODY MASS INDEX: 31.53 KG/M2

## 2021-01-13 DIAGNOSIS — R79.89 OTHER SPECIFIED ABNORMAL FINDINGS OF BLOOD CHEMISTRY: ICD-10-CM

## 2021-01-13 DIAGNOSIS — E66.9 OBESITY (BMI 30.0-34.9): ICD-10-CM

## 2021-01-13 DIAGNOSIS — D50.8 OTHER IRON DEFICIENCY ANEMIA: ICD-10-CM

## 2021-01-13 DIAGNOSIS — Z67.41 BLOOD TYPE O-: ICD-10-CM

## 2021-01-13 DIAGNOSIS — E03.9 HYPOTHYROIDISM, UNSPECIFIED TYPE: ICD-10-CM

## 2021-01-13 DIAGNOSIS — D51.0 PERNICIOUS ANEMIA: ICD-10-CM

## 2021-01-13 DIAGNOSIS — Z00.00 ROUTINE ADULT HEALTH MAINTENANCE: Primary | ICD-10-CM

## 2021-01-13 DIAGNOSIS — E78.9 DISORDER OF LIPID METABOLISM: ICD-10-CM

## 2021-01-13 DIAGNOSIS — Z13.31 SCREENING FOR DEPRESSION: ICD-10-CM

## 2021-01-13 DIAGNOSIS — Z13.89 SCREENING FOR OBESITY: ICD-10-CM

## 2021-01-13 DIAGNOSIS — E55.9 VITAMIN D DEFICIENCY: ICD-10-CM

## 2021-01-13 DIAGNOSIS — Z85.828 HX OF BASAL CELL CARCINOMA: ICD-10-CM

## 2021-01-13 DIAGNOSIS — Z13.39 SCREENING FOR ALCOHOLISM: ICD-10-CM

## 2021-01-13 PROBLEM — E66.811 OBESITY (BMI 30.0-34.9): Status: ACTIVE | Noted: 2021-01-13

## 2021-01-13 LAB
ALBUMIN SERPL BCP-MCNC: 4.4 G/DL (ref 3.5–5.2)
ALP SERPL-CCNC: 101 U/L (ref 55–135)
ALT SERPL W/O P-5'-P-CCNC: 19 U/L (ref 10–44)
ANION GAP SERPL CALC-SCNC: 11 MMOL/L (ref 8–16)
AST SERPL-CCNC: 21 U/L (ref 10–40)
BASOPHILS # BLD AUTO: 0.04 K/UL (ref 0–0.2)
BASOPHILS NFR BLD: 0.5 % (ref 0–1.9)
BILIRUB SERPL-MCNC: 0.5 MG/DL (ref 0.1–1)
BUN SERPL-MCNC: 16 MG/DL (ref 8–23)
CALCIUM SERPL-MCNC: 9.3 MG/DL (ref 8.7–10.5)
CHLORIDE SERPL-SCNC: 105 MMOL/L (ref 95–110)
CO2 SERPL-SCNC: 25 MMOL/L (ref 23–29)
CREAT SERPL-MCNC: 0.8 MG/DL (ref 0.5–1.4)
DIFFERENTIAL METHOD: ABNORMAL
EOSINOPHIL # BLD AUTO: 0 K/UL (ref 0–0.5)
EOSINOPHIL NFR BLD: 0.5 % (ref 0–8)
ERYTHROCYTE [DISTWIDTH] IN BLOOD BY AUTOMATED COUNT: 12.2 % (ref 11.5–14.5)
EST. GFR  (AFRICAN AMERICAN): >60 ML/MIN/1.73 M^2
EST. GFR  (NON AFRICAN AMERICAN): >60 ML/MIN/1.73 M^2
GLUCOSE SERPL-MCNC: 97 MG/DL (ref 70–110)
HCT VFR BLD AUTO: 40.7 % (ref 37–48.5)
HGB BLD-MCNC: 13.2 G/DL (ref 12–16)
IMM GRANULOCYTES # BLD AUTO: 0.02 K/UL (ref 0–0.04)
IMM GRANULOCYTES NFR BLD AUTO: 0.2 % (ref 0–0.5)
LYMPHOCYTES # BLD AUTO: 3 K/UL (ref 1–4.8)
LYMPHOCYTES NFR BLD: 34.1 % (ref 18–48)
MCH RBC QN AUTO: 31.4 PG (ref 27–31)
MCHC RBC AUTO-ENTMCNC: 32.4 G/DL (ref 32–36)
MCV RBC AUTO: 97 FL (ref 82–98)
MONOCYTES # BLD AUTO: 0.8 K/UL (ref 0.3–1)
MONOCYTES NFR BLD: 9.1 % (ref 4–15)
NEUTROPHILS # BLD AUTO: 4.9 K/UL (ref 1.8–7.7)
NEUTROPHILS NFR BLD: 55.6 % (ref 38–73)
NRBC BLD-RTO: 0 /100 WBC
PLATELET # BLD AUTO: 304 K/UL (ref 150–350)
PMV BLD AUTO: 8.8 FL (ref 9.2–12.9)
POTASSIUM SERPL-SCNC: 4 MMOL/L (ref 3.5–5.1)
PROT SERPL-MCNC: 7.4 G/DL (ref 6–8.4)
RBC # BLD AUTO: 4.2 M/UL (ref 4–5.4)
SODIUM SERPL-SCNC: 141 MMOL/L (ref 136–145)
TSH SERPL DL<=0.005 MIU/L-ACNC: 1.99 UIU/ML (ref 0.4–4)
WBC # BLD AUTO: 8.82 K/UL (ref 3.9–12.7)

## 2021-01-13 PROCEDURE — 1160F PR REVIEW ALL MEDS BY PRESCRIBER/CLIN PHARMACIST DOCUMENTED: ICD-10-PCS | Mod: S$GLB,,, | Performed by: INTERNAL MEDICINE

## 2021-01-13 PROCEDURE — 82306 VITAMIN D 25 HYDROXY: CPT | Mod: HCNC

## 2021-01-13 PROCEDURE — 1159F MED LIST DOCD IN RCRD: CPT | Mod: S$GLB,,, | Performed by: INTERNAL MEDICINE

## 2021-01-13 PROCEDURE — 3017F COLORECTAL CA SCREEN DOC REV: CPT | Mod: S$GLB,,, | Performed by: INTERNAL MEDICINE

## 2021-01-13 PROCEDURE — 99397 PER PM REEVAL EST PAT 65+ YR: CPT | Mod: 25,S$GLB,, | Performed by: INTERNAL MEDICINE

## 2021-01-13 PROCEDURE — G0442 PR  ALCOHOL SCREENING: ICD-10-PCS | Mod: S$GLB,,, | Performed by: INTERNAL MEDICINE

## 2021-01-13 PROCEDURE — 85025 COMPLETE CBC W/AUTO DIFF WBC: CPT | Mod: HCNC

## 2021-01-13 PROCEDURE — 80061 LIPID PANEL: CPT | Mod: HCNC

## 2021-01-13 PROCEDURE — 3008F BODY MASS INDEX DOCD: CPT | Mod: CPTII,S$GLB,, | Performed by: INTERNAL MEDICINE

## 2021-01-13 PROCEDURE — 99397 PR PREVENTIVE VISIT,EST,65 & OVER: ICD-10-PCS | Mod: 25,S$GLB,, | Performed by: INTERNAL MEDICINE

## 2021-01-13 PROCEDURE — 36415 COLL VENOUS BLD VENIPUNCTURE: CPT | Mod: HCNC

## 2021-01-13 PROCEDURE — 3017F PR COLORECTAL CANCER SCREEN RESULTS DOCUMENT/REVIEW: ICD-10-PCS | Mod: S$GLB,,, | Performed by: INTERNAL MEDICINE

## 2021-01-13 PROCEDURE — 3014F SCREEN MAMMO DOC REV: CPT | Mod: S$GLB,,, | Performed by: INTERNAL MEDICINE

## 2021-01-13 PROCEDURE — 1159F PR MEDICATION LIST DOCUMENTED IN MEDICAL RECORD: ICD-10-PCS | Mod: S$GLB,,, | Performed by: INTERNAL MEDICINE

## 2021-01-13 PROCEDURE — 3008F PR BODY MASS INDEX (BMI) DOCUMENTED: ICD-10-PCS | Mod: CPTII,S$GLB,, | Performed by: INTERNAL MEDICINE

## 2021-01-13 PROCEDURE — 84443 ASSAY THYROID STIM HORMONE: CPT | Mod: HCNC

## 2021-01-13 PROCEDURE — G0447 PR OBESITY COUNSELING: ICD-10-PCS | Mod: S$GLB,,, | Performed by: INTERNAL MEDICINE

## 2021-01-13 PROCEDURE — 83036 HEMOGLOBIN GLYCOSYLATED A1C: CPT | Mod: HCNC

## 2021-01-13 PROCEDURE — G0444 DEPRESSION SCREEN ANNUAL: HCPCS | Mod: 59,S$GLB,, | Performed by: INTERNAL MEDICINE

## 2021-01-13 PROCEDURE — 3014F PR SCREENING MAMMOGRAM RESULTS DOCUMENTED AND REVIEWED: ICD-10-PCS | Mod: S$GLB,,, | Performed by: INTERNAL MEDICINE

## 2021-01-13 PROCEDURE — 82607 VITAMIN B-12: CPT | Mod: HCNC

## 2021-01-13 PROCEDURE — G0447 BEHAVIOR COUNSEL OBESITY 15M: HCPCS | Mod: S$GLB,,, | Performed by: INTERNAL MEDICINE

## 2021-01-13 PROCEDURE — 1160F RVW MEDS BY RX/DR IN RCRD: CPT | Mod: S$GLB,,, | Performed by: INTERNAL MEDICINE

## 2021-01-13 PROCEDURE — G0444 PR DEPRESSION SCREENING: ICD-10-PCS | Mod: 59,S$GLB,, | Performed by: INTERNAL MEDICINE

## 2021-01-13 PROCEDURE — G0442 ANNUAL ALCOHOL SCREEN 15 MIN: HCPCS | Mod: S$GLB,,, | Performed by: INTERNAL MEDICINE

## 2021-01-13 PROCEDURE — 80053 COMPREHEN METABOLIC PANEL: CPT | Mod: HCNC

## 2021-01-14 LAB
25(OH)D3+25(OH)D2 SERPL-MCNC: 26 NG/ML (ref 30–96)
CHOLEST SERPL-MCNC: 212 MG/DL (ref 120–199)
CHOLEST/HDLC SERPL: 3.2 {RATIO} (ref 2–5)
ESTIMATED AVG GLUCOSE: 94 MG/DL (ref 68–131)
HBA1C MFR BLD HPLC: 4.9 % (ref 4–5.6)
HDLC SERPL-MCNC: 66 MG/DL (ref 40–75)
HDLC SERPL: 31.1 % (ref 20–50)
LDLC SERPL CALC-MCNC: 130.2 MG/DL (ref 63–159)
NONHDLC SERPL-MCNC: 146 MG/DL
TRIGL SERPL-MCNC: 79 MG/DL (ref 30–150)
VIT B12 SERPL-MCNC: 827 PG/ML (ref 210–950)

## 2021-02-08 DIAGNOSIS — Z12.31 OTHER SCREENING MAMMOGRAM: ICD-10-CM

## 2021-12-16 ENCOUNTER — OFFICE VISIT (OUTPATIENT)
Dept: INTERNAL MEDICINE | Facility: CLINIC | Age: 71
End: 2021-12-16
Attending: INTERNAL MEDICINE
Payer: MEDICARE

## 2021-12-16 VITALS
BODY MASS INDEX: 31.91 KG/M2 | OXYGEN SATURATION: 97 % | WEIGHT: 169 LBS | DIASTOLIC BLOOD PRESSURE: 68 MMHG | HEART RATE: 74 BPM | HEIGHT: 61 IN | SYSTOLIC BLOOD PRESSURE: 122 MMHG

## 2021-12-16 DIAGNOSIS — S61.412A LACERATION OF LEFT HAND WITHOUT FOREIGN BODY, INITIAL ENCOUNTER: ICD-10-CM

## 2021-12-16 DIAGNOSIS — L98.9 SKIN LESION OF HAND: Primary | ICD-10-CM

## 2021-12-16 DIAGNOSIS — Z12.31 OTHER SCREENING MAMMOGRAM: ICD-10-CM

## 2021-12-16 PROCEDURE — 17000 DESTRUCT PREMALG LESION: CPT | Mod: S$GLB,,, | Performed by: INTERNAL MEDICINE

## 2021-12-16 PROCEDURE — 17000 DESTRUCTION, PREMALIGNANT LESION: ICD-10-PCS | Mod: S$GLB,,, | Performed by: INTERNAL MEDICINE

## 2021-12-16 PROCEDURE — 99213 OFFICE O/P EST LOW 20 MIN: CPT | Mod: 25,S$GLB,, | Performed by: INTERNAL MEDICINE

## 2021-12-16 PROCEDURE — 99213 PR OFFICE/OUTPT VISIT, EST, LEVL III, 20-29 MIN: ICD-10-PCS | Mod: 25,S$GLB,, | Performed by: INTERNAL MEDICINE

## 2022-01-13 ENCOUNTER — OFFICE VISIT (OUTPATIENT)
Dept: INTERNAL MEDICINE | Facility: CLINIC | Age: 72
End: 2022-01-13
Attending: INTERNAL MEDICINE
Payer: MEDICARE

## 2022-01-13 ENCOUNTER — LAB VISIT (OUTPATIENT)
Dept: LAB | Facility: OTHER | Age: 72
End: 2022-01-13
Attending: INTERNAL MEDICINE
Payer: MEDICARE

## 2022-01-13 VITALS
HEIGHT: 61 IN | OXYGEN SATURATION: 97 % | WEIGHT: 166 LBS | BODY MASS INDEX: 31.34 KG/M2 | SYSTOLIC BLOOD PRESSURE: 128 MMHG | DIASTOLIC BLOOD PRESSURE: 70 MMHG | HEART RATE: 96 BPM

## 2022-01-13 DIAGNOSIS — D51.0 PERNICIOUS ANEMIA: ICD-10-CM

## 2022-01-13 DIAGNOSIS — D50.8 OTHER IRON DEFICIENCY ANEMIA: ICD-10-CM

## 2022-01-13 DIAGNOSIS — R79.89 OTHER SPECIFIED ABNORMAL FINDINGS OF BLOOD CHEMISTRY: ICD-10-CM

## 2022-01-13 DIAGNOSIS — Z13.31 SCREENING FOR DEPRESSION: ICD-10-CM

## 2022-01-13 DIAGNOSIS — E03.9 HYPOTHYROIDISM, UNSPECIFIED TYPE: ICD-10-CM

## 2022-01-13 DIAGNOSIS — E78.9 DISORDER OF LIPID METABOLISM: ICD-10-CM

## 2022-01-13 DIAGNOSIS — Z00.00 ROUTINE ADULT HEALTH MAINTENANCE: Primary | ICD-10-CM

## 2022-01-13 DIAGNOSIS — Z13.89 SCREENING FOR OBESITY: ICD-10-CM

## 2022-01-13 DIAGNOSIS — E66.9 OBESITY (BMI 30.0-34.9): ICD-10-CM

## 2022-01-13 DIAGNOSIS — M19.031 PRIMARY OSTEOARTHRITIS OF RIGHT WRIST: ICD-10-CM

## 2022-01-13 DIAGNOSIS — Z13.39 SCREENING FOR ALCOHOLISM: ICD-10-CM

## 2022-01-13 DIAGNOSIS — Z12.31 BREAST CANCER SCREENING BY MAMMOGRAM: ICD-10-CM

## 2022-01-13 DIAGNOSIS — E55.9 VITAMIN D DEFICIENCY: ICD-10-CM

## 2022-01-13 LAB
ALBUMIN SERPL BCP-MCNC: 4.2 G/DL (ref 3.5–5.2)
ALP SERPL-CCNC: 112 U/L (ref 55–135)
ALT SERPL W/O P-5'-P-CCNC: 20 U/L (ref 10–44)
ANION GAP SERPL CALC-SCNC: 8 MMOL/L (ref 8–16)
AST SERPL-CCNC: 18 U/L (ref 10–40)
BASOPHILS # BLD AUTO: 0.05 K/UL (ref 0–0.2)
BASOPHILS NFR BLD: 0.7 % (ref 0–1.9)
BILIRUB SERPL-MCNC: 0.3 MG/DL (ref 0.1–1)
BUN SERPL-MCNC: 16 MG/DL (ref 8–23)
CALCIUM SERPL-MCNC: 10 MG/DL (ref 8.7–10.5)
CHLORIDE SERPL-SCNC: 104 MMOL/L (ref 95–110)
CHOLEST SERPL-MCNC: 223 MG/DL (ref 120–199)
CHOLEST/HDLC SERPL: 3.2 {RATIO} (ref 2–5)
CO2 SERPL-SCNC: 30 MMOL/L (ref 23–29)
CREAT SERPL-MCNC: 0.7 MG/DL (ref 0.5–1.4)
DIFFERENTIAL METHOD: ABNORMAL
EOSINOPHIL # BLD AUTO: 0.1 K/UL (ref 0–0.5)
EOSINOPHIL NFR BLD: 1.2 % (ref 0–8)
ERYTHROCYTE [DISTWIDTH] IN BLOOD BY AUTOMATED COUNT: 12.4 % (ref 11.5–14.5)
EST. GFR  (AFRICAN AMERICAN): >60 ML/MIN/1.73 M^2
EST. GFR  (NON AFRICAN AMERICAN): >60 ML/MIN/1.73 M^2
ESTIMATED AVG GLUCOSE: 103 MG/DL (ref 68–131)
GLUCOSE SERPL-MCNC: 100 MG/DL (ref 70–110)
HBA1C MFR BLD: 5.2 % (ref 4–5.6)
HCT VFR BLD AUTO: 43.3 % (ref 37–48.5)
HDLC SERPL-MCNC: 70 MG/DL (ref 40–75)
HDLC SERPL: 31.4 % (ref 20–50)
HGB BLD-MCNC: 14.2 G/DL (ref 12–16)
IMM GRANULOCYTES # BLD AUTO: 0.01 K/UL (ref 0–0.04)
IMM GRANULOCYTES NFR BLD AUTO: 0.1 % (ref 0–0.5)
LDLC SERPL CALC-MCNC: 129.6 MG/DL (ref 63–159)
LYMPHOCYTES # BLD AUTO: 3.3 K/UL (ref 1–4.8)
LYMPHOCYTES NFR BLD: 43.7 % (ref 18–48)
MCH RBC QN AUTO: 31.5 PG (ref 27–31)
MCHC RBC AUTO-ENTMCNC: 32.8 G/DL (ref 32–36)
MCV RBC AUTO: 96 FL (ref 82–98)
MONOCYTES # BLD AUTO: 0.7 K/UL (ref 0.3–1)
MONOCYTES NFR BLD: 9.9 % (ref 4–15)
NEUTROPHILS # BLD AUTO: 3.3 K/UL (ref 1.8–7.7)
NEUTROPHILS NFR BLD: 44.4 % (ref 38–73)
NONHDLC SERPL-MCNC: 153 MG/DL
NRBC BLD-RTO: 0 /100 WBC
PLATELET # BLD AUTO: 303 K/UL (ref 150–450)
PMV BLD AUTO: 8.2 FL (ref 9.2–12.9)
POTASSIUM SERPL-SCNC: 4.6 MMOL/L (ref 3.5–5.1)
PROT SERPL-MCNC: 7.1 G/DL (ref 6–8.4)
RBC # BLD AUTO: 4.51 M/UL (ref 4–5.4)
SODIUM SERPL-SCNC: 142 MMOL/L (ref 136–145)
TRIGL SERPL-MCNC: 117 MG/DL (ref 30–150)
TSH SERPL DL<=0.005 MIU/L-ACNC: 3.26 UIU/ML (ref 0.4–4)
VIT B12 SERPL-MCNC: 710 PG/ML (ref 210–950)
WBC # BLD AUTO: 7.48 K/UL (ref 3.9–12.7)

## 2022-01-13 PROCEDURE — G0442 ANNUAL ALCOHOL SCREEN 15 MIN: HCPCS | Mod: 59,S$GLB,, | Performed by: INTERNAL MEDICINE

## 2022-01-13 PROCEDURE — 84443 ASSAY THYROID STIM HORMONE: CPT | Mod: HCNC | Performed by: INTERNAL MEDICINE

## 2022-01-13 PROCEDURE — G0444 DEPRESSION SCREEN ANNUAL: HCPCS | Mod: 59,S$GLB,, | Performed by: INTERNAL MEDICINE

## 2022-01-13 PROCEDURE — G0447 PR OBESITY COUNSELING: ICD-10-PCS | Mod: 59,S$GLB,, | Performed by: INTERNAL MEDICINE

## 2022-01-13 PROCEDURE — G0444 PR DEPRESSION SCREENING: ICD-10-PCS | Mod: 59,S$GLB,, | Performed by: INTERNAL MEDICINE

## 2022-01-13 PROCEDURE — 1159F MED LIST DOCD IN RCRD: CPT | Mod: CPTII,S$GLB,, | Performed by: INTERNAL MEDICINE

## 2022-01-13 PROCEDURE — 1160F PR REVIEW ALL MEDS BY PRESCRIBER/CLIN PHARMACIST DOCUMENTED: ICD-10-PCS | Mod: CPTII,S$GLB,, | Performed by: INTERNAL MEDICINE

## 2022-01-13 PROCEDURE — G0447 BEHAVIOR COUNSEL OBESITY 15M: HCPCS | Mod: 59,S$GLB,, | Performed by: INTERNAL MEDICINE

## 2022-01-13 PROCEDURE — 3008F BODY MASS INDEX DOCD: CPT | Mod: CPTII,S$GLB,, | Performed by: INTERNAL MEDICINE

## 2022-01-13 PROCEDURE — 99213 OFFICE O/P EST LOW 20 MIN: CPT | Mod: 25,S$GLB,, | Performed by: INTERNAL MEDICINE

## 2022-01-13 PROCEDURE — 99213 PR OFFICE/OUTPT VISIT, EST, LEVL III, 20-29 MIN: ICD-10-PCS | Mod: 25,S$GLB,, | Performed by: INTERNAL MEDICINE

## 2022-01-13 PROCEDURE — 85025 COMPLETE CBC W/AUTO DIFF WBC: CPT | Mod: HCNC | Performed by: INTERNAL MEDICINE

## 2022-01-13 PROCEDURE — 3008F PR BODY MASS INDEX (BMI) DOCUMENTED: ICD-10-PCS | Mod: CPTII,S$GLB,, | Performed by: INTERNAL MEDICINE

## 2022-01-13 PROCEDURE — 1159F PR MEDICATION LIST DOCUMENTED IN MEDICAL RECORD: ICD-10-PCS | Mod: CPTII,S$GLB,, | Performed by: INTERNAL MEDICINE

## 2022-01-13 PROCEDURE — 83036 HEMOGLOBIN GLYCOSYLATED A1C: CPT | Mod: HCNC | Performed by: INTERNAL MEDICINE

## 2022-01-13 PROCEDURE — 1160F RVW MEDS BY RX/DR IN RCRD: CPT | Mod: CPTII,S$GLB,, | Performed by: INTERNAL MEDICINE

## 2022-01-13 PROCEDURE — G0442 PR  ALCOHOL SCREENING: ICD-10-PCS | Mod: 59,S$GLB,, | Performed by: INTERNAL MEDICINE

## 2022-01-13 PROCEDURE — 36415 COLL VENOUS BLD VENIPUNCTURE: CPT | Mod: HCNC | Performed by: INTERNAL MEDICINE

## 2022-01-13 PROCEDURE — 82607 VITAMIN B-12: CPT | Mod: HCNC | Performed by: INTERNAL MEDICINE

## 2022-01-13 PROCEDURE — 80053 COMPREHEN METABOLIC PANEL: CPT | Mod: HCNC | Performed by: INTERNAL MEDICINE

## 2022-01-13 PROCEDURE — 80061 LIPID PANEL: CPT | Mod: HCNC | Performed by: INTERNAL MEDICINE

## 2022-01-13 RX ORDER — MELOXICAM 15 MG/1
15 TABLET ORAL DAILY PRN
Qty: 30 TABLET | Refills: 1 | Status: SHIPPED | OUTPATIENT
Start: 2022-01-13 | End: 2022-09-06

## 2022-01-13 NOTE — PATIENT INSTRUCTIONS
Tips for Healthy Living and Routine Preventative Care - 2022                                                            (These guidelines are intended for healthy adults)      1. Exercise  Exercise aerobically with a target heart rate of (220-age) x 0.8  Exercise 30-45 minutes on most days of the week    2. Diet and Supplements- All supplements can be obtained through a varied, healthy diet   Calcium: 1,000 - 1,200 mg each day   8 oz milk or Calcium fortified O.J. = 300, 8 oz Yogurt = 400 mg, 1 oz of cheese =100-200 mg              8 oz Oatmeal = 215 mg, 3 oz [place] = 240 mg  Vitamin D: 800 iu each day- Can probably be obtained by 30 min. of direct sunlight    each day             3 oz. [place] = 800 iu,  3 oz. Tuna =150 iu, Milk or fortified O.J. = 120 iu  Fish oil: 1-2 grams each day or about 840 mg of EPA and DHA (Omega-3 fatty acids) each day             3 oz. [place]=2 grams,  3 oz. Tuna=1.3 grams,  3 oz. drained light Tuna= 0.25 grams  Folic acid 800 mcg each day for all women planning or capable of pregnancy    3. Lifestyle  Alcohol: 1 drink = 12 oz. domestic beer, 4 oz. wine, or 1 oz. hard (80 proof) liquor             Males: </= 14 drinks per week with no more than 4 in any one day             Females: </= 7 drinks per week with no more than 3 in any one day  Salt: 1.2 - 3 grams of Sodium each day.  Tobacco: Dont smoke, or quit smoking (discuss with your doctor)  Depression: If you feel depressed discuss with your doctor  Weight: Maintain a healthy body weight. Stay within 10% of:             Males: 106 lbs. + 6 lbs per inch height above 5 feet             Females: 100 lbs + 5 lbs per inch height above 5 feet    4. Routine tests  Blood pressure check at each visit, or at least once each year  HIV screening (one time) if less than 65 years old  Hepatitis C screen (one time) if less than 80 years old  Cholesterol screening every 3 years starting at age 21  Glucose/Hemaglobin A1C check every 2-3 years starting  at age 45. Check at age 35 if overweight  TSH (thyroid screen) every 2 years starting at age 50  Colonoscopy at age 50 (or age 45), and repeat every 10 years until age 75. Consider screening until age 85  Vision screen at age 65    Females:   Gyn exam with cervical HPV test every 3 years or Pap smear every three years starting at age 25                  Stop screening at age 65 if past 3 exams were normal                  No screening for women who have had a hysterectomy with removal of cervix  Mammogram every 1-2 years starting at age 40 (or age 50) until age 75.  Bone density scan at about age 65    Males:  PSA screening annually at age 50, age 45 for  Americans, until age 75. Consider annual screening after age 75                   5. Immunizations  Influenza vaccine every year in the fall, especially if >50 or with a chronic disease  Tetanus/Diphtheria/Pertussis (Tdap) vaccine once (after the age of 18), then Tetanus/Diphtheria (Td) or Tdap vaccine every 10 years  Shingles (Shingrix) vaccine after age 50 and get a 2nd dose after 2-6 months  Pneumonia vaccine at age 65. 2nd Pneumonia vaccine at least 1 year later (1st should be Prevnar-13, and 2nd should be Pneumovax-23).        6. Advanced Directive/End of life care planning  You should consider having a signed document which informs physicians and family of your end of life care wishes.  You can go to [email or web address]. Under Step 1 click download AdobePDF and print.  This is the [place] physician order for scope of Treatment ([place]) form.  You can also request a blank copy of the [place] form from my office.  Bring a copy of the signed document to my office so we can scan it in your medical chart.

## 2022-01-14 PROBLEM — M19.031 DJD (DEGENERATIVE JOINT DISEASE) OF RIGHT WRIST: Status: ACTIVE | Noted: 2022-01-14

## 2022-01-14 NOTE — PROGRESS NOTES
Subjective:       Patient ID: Chely Kramer is a 71 y.o. female.    Chief Complaint: Annual Exam and Wrist Pain (Right )    She gets pain in her right wrist mostly when she works on the weekends.  It is relieved with Advil.    Wrist Pain   This is a new problem. The current episode started more than 1 month ago. The problem occurs every several days.     Review of Systems   Constitutional: Negative.    Respiratory: Negative for shortness of breath.    Cardiovascular: Negative for chest pain.   Musculoskeletal: Positive for arthralgias.         Objective:      Physical Exam  Vitals and nursing note reviewed.   Constitutional:       Appearance: She is well-developed and well-nourished.   HENT:      Head: Normocephalic.   Eyes:      Pupils: Pupils are equal, round, and reactive to light.   Cardiovascular:      Rate and Rhythm: Normal rate and regular rhythm.      Heart sounds: Normal heart sounds.   Pulmonary:      Effort: Pulmonary effort is normal.   Neurological:      Mental Status: She is alert.         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     meloxicam as needed for right wrist DJD.  She will mostly take it on the weekends when she is working.    Check routine labs.    Screening: The patient was screened for depression with the PHQ2 questionnaire and possible health consequences were discussed with the patient, who understands (15 minutes spent).       The patient was screened for the misuse of alcohol, by asking the number of drinks per average week, and if pt has had more than 4  drinks (more than 3 for women and elderly) in 1 day within the past year. The health and legal consequences of misuse were discussed (15 minutes spent).       The patient was screened for obesity (BMI>30), Since the current BMI is > 30, the possible consequences of obesity, as well as the benefits of diet, exercise, and weight loss were discussed. Any behavioral risks were identified, and methods to achieve appropriate treatment goals were discussed (15 minutes spent).

## 2022-02-08 ENCOUNTER — HOSPITAL ENCOUNTER (OUTPATIENT)
Dept: RADIOLOGY | Facility: OTHER | Age: 72
Discharge: HOME | End: 2022-02-08
Attending: INTERNAL MEDICINE
Payer: MEDICARE

## 2022-02-08 DIAGNOSIS — Z12.31 BREAST CANCER SCREENING BY MAMMOGRAM: ICD-10-CM

## 2022-02-08 PROCEDURE — 77063 BREAST TOMOSYNTHESIS BI: CPT | Mod: TC,HCNC

## 2022-02-08 PROCEDURE — 77067 SCR MAMMO BI INCL CAD: CPT | Mod: 26,HCNC,, | Performed by: RADIOLOGY

## 2022-02-08 PROCEDURE — 77067 MAMMO DIGITAL SCREENING BILAT WITH TOMO: ICD-10-PCS | Mod: 26,HCNC,, | Performed by: RADIOLOGY

## 2022-02-08 PROCEDURE — 77063 MAMMO DIGITAL SCREENING BILAT WITH TOMO: ICD-10-PCS | Mod: 26,HCNC,, | Performed by: RADIOLOGY

## 2022-02-08 PROCEDURE — 77063 BREAST TOMOSYNTHESIS BI: CPT | Mod: 26,HCNC,, | Performed by: RADIOLOGY

## 2022-02-08 PROCEDURE — 77067 SCR MAMMO BI INCL CAD: CPT | Mod: TC,HCNC

## 2022-06-03 ENCOUNTER — DOCUMENTATION ONLY (OUTPATIENT)
Dept: INTERNAL MEDICINE | Facility: CLINIC | Age: 72
End: 2022-06-03
Payer: MEDICARE

## 2022-06-03 DIAGNOSIS — M19.031 PRIMARY OSTEOARTHRITIS OF RIGHT WRIST: ICD-10-CM

## 2022-06-03 DIAGNOSIS — Z85.828 HX OF BASAL CELL CARCINOMA: ICD-10-CM

## 2022-06-03 DIAGNOSIS — Z78.9 KNOWN MEDICAL PROBLEMS: Primary | ICD-10-CM

## 2022-06-03 DIAGNOSIS — Z87.19 HISTORY OF COLONIC DIVERTICULITIS: ICD-10-CM

## 2022-06-03 PROCEDURE — 99490 CHRNC CARE MGMT STAFF 1ST 20: CPT | Mod: S$GLB,,, | Performed by: INTERNAL MEDICINE

## 2022-06-03 PROCEDURE — 99490 PR CHRONIC CARE MGMT, 1ST 20 MIN: ICD-10-PCS | Mod: S$GLB,,, | Performed by: INTERNAL MEDICINE

## 2022-06-03 RX ORDER — METRONIDAZOLE 250 MG/1
250 TABLET ORAL 3 TIMES DAILY
Qty: 30 TABLET | Refills: 0 | Status: SHIPPED | OUTPATIENT
Start: 2022-06-03 | End: 2022-06-08 | Stop reason: ALTCHOICE

## 2022-06-03 RX ORDER — CIPROFLOXACIN 500 MG/1
500 TABLET ORAL 2 TIMES DAILY
Qty: 20 TABLET | Refills: 0 | Status: SHIPPED | OUTPATIENT
Start: 2022-06-03 | End: 2022-06-13

## 2022-06-03 NOTE — PROGRESS NOTES
The patient's electronic chart was reviewed during this month. The patient's medical, functional, and psychosocial needs were assessed. Need for Home health care, PT, OT, , psychiatric care, and hospice was assessed. All preventative care measures were reviewed and updated. All medications were reviewed and reconciled. Potential drug interactions and medication adherence was reviewed. Prescriptions were renewed as appropriate. Education was provided to the patient and/or caregiver as needed, and all questions were answered. Over 20 minutes were spent providing these non-face-to-face services during this calendar month.     Discussed with patient symptoms of diverticulitis, consulted with Dr. Herrera. Called in Cipro and Flagyl

## 2022-06-08 DIAGNOSIS — Z87.19 HISTORY OF COLONIC DIVERTICULITIS: Primary | ICD-10-CM

## 2022-06-08 RX ORDER — AMOXICILLIN AND CLAVULANATE POTASSIUM 875; 125 MG/1; MG/1
1 TABLET, FILM COATED ORAL 2 TIMES DAILY
Qty: 10 TABLET | Refills: 0 | Status: SHIPPED | OUTPATIENT
Start: 2022-06-08 | End: 2022-06-22

## 2022-06-13 DIAGNOSIS — B37.9 YEAST INFECTION: Primary | ICD-10-CM

## 2022-06-13 RX ORDER — FLUCONAZOLE 150 MG/1
150 TABLET ORAL DAILY PRN
Qty: 3 TABLET | Refills: 0 | Status: SHIPPED | OUTPATIENT
Start: 2022-06-13 | End: 2022-06-22

## 2022-06-22 ENCOUNTER — OFFICE VISIT (OUTPATIENT)
Dept: INTERNAL MEDICINE | Facility: CLINIC | Age: 72
End: 2022-06-22
Attending: INTERNAL MEDICINE
Payer: MEDICARE

## 2022-06-22 VITALS
HEIGHT: 61 IN | HEART RATE: 88 BPM | OXYGEN SATURATION: 96 % | WEIGHT: 166 LBS | DIASTOLIC BLOOD PRESSURE: 68 MMHG | BODY MASS INDEX: 31.34 KG/M2 | SYSTOLIC BLOOD PRESSURE: 112 MMHG

## 2022-06-22 DIAGNOSIS — K57.92 DIVERTICULITIS: Primary | ICD-10-CM

## 2022-06-22 PROCEDURE — 99213 PR OFFICE/OUTPT VISIT, EST, LEVL III, 20-29 MIN: ICD-10-PCS | Mod: S$GLB,,, | Performed by: INTERNAL MEDICINE

## 2022-06-22 PROCEDURE — 3074F SYST BP LT 130 MM HG: CPT | Mod: CPTII,S$GLB,, | Performed by: INTERNAL MEDICINE

## 2022-06-22 PROCEDURE — 1160F RVW MEDS BY RX/DR IN RCRD: CPT | Mod: CPTII,S$GLB,, | Performed by: INTERNAL MEDICINE

## 2022-06-22 PROCEDURE — 1159F PR MEDICATION LIST DOCUMENTED IN MEDICAL RECORD: ICD-10-PCS | Mod: CPTII,S$GLB,, | Performed by: INTERNAL MEDICINE

## 2022-06-22 PROCEDURE — 3078F PR MOST RECENT DIASTOLIC BLOOD PRESSURE < 80 MM HG: ICD-10-PCS | Mod: CPTII,S$GLB,, | Performed by: INTERNAL MEDICINE

## 2022-06-22 PROCEDURE — 3044F PR MOST RECENT HEMOGLOBIN A1C LEVEL <7.0%: ICD-10-PCS | Mod: CPTII,S$GLB,, | Performed by: INTERNAL MEDICINE

## 2022-06-22 PROCEDURE — 1160F PR REVIEW ALL MEDS BY PRESCRIBER/CLIN PHARMACIST DOCUMENTED: ICD-10-PCS | Mod: CPTII,S$GLB,, | Performed by: INTERNAL MEDICINE

## 2022-06-22 PROCEDURE — 1159F MED LIST DOCD IN RCRD: CPT | Mod: CPTII,S$GLB,, | Performed by: INTERNAL MEDICINE

## 2022-06-22 PROCEDURE — 99213 OFFICE O/P EST LOW 20 MIN: CPT | Mod: S$GLB,,, | Performed by: INTERNAL MEDICINE

## 2022-06-22 PROCEDURE — 3074F PR MOST RECENT SYSTOLIC BLOOD PRESSURE < 130 MM HG: ICD-10-PCS | Mod: CPTII,S$GLB,, | Performed by: INTERNAL MEDICINE

## 2022-06-22 PROCEDURE — 3044F HG A1C LEVEL LT 7.0%: CPT | Mod: CPTII,S$GLB,, | Performed by: INTERNAL MEDICINE

## 2022-06-22 PROCEDURE — 3008F PR BODY MASS INDEX (BMI) DOCUMENTED: ICD-10-PCS | Mod: CPTII,S$GLB,, | Performed by: INTERNAL MEDICINE

## 2022-06-22 PROCEDURE — 3078F DIAST BP <80 MM HG: CPT | Mod: CPTII,S$GLB,, | Performed by: INTERNAL MEDICINE

## 2022-06-22 PROCEDURE — 3008F BODY MASS INDEX DOCD: CPT | Mod: CPTII,S$GLB,, | Performed by: INTERNAL MEDICINE

## 2022-08-31 DIAGNOSIS — Z78.0 MENOPAUSE: ICD-10-CM

## 2022-09-06 ENCOUNTER — DOCUMENTATION ONLY (OUTPATIENT)
Dept: INTERNAL MEDICINE | Facility: CLINIC | Age: 72
End: 2022-09-06
Payer: MEDICARE

## 2022-09-06 DIAGNOSIS — E03.9 HYPOTHYROIDISM, UNSPECIFIED TYPE: ICD-10-CM

## 2022-09-06 DIAGNOSIS — M19.031 PRIMARY OSTEOARTHRITIS OF RIGHT WRIST: Primary | ICD-10-CM

## 2022-09-06 DIAGNOSIS — K57.92 DIVERTICULITIS: ICD-10-CM

## 2022-09-06 DIAGNOSIS — R76.8 HEPATITIS C ANTIBODY POSITIVE IN BLOOD: ICD-10-CM

## 2022-09-06 DIAGNOSIS — Z78.9 KNOWN MEDICAL PROBLEMS: ICD-10-CM

## 2022-09-06 PROCEDURE — 99490 PR CHRONIC CARE MGMT, 1ST 20 MIN: ICD-10-PCS | Mod: S$GLB,,, | Performed by: INTERNAL MEDICINE

## 2022-09-06 PROCEDURE — 99490 CHRNC CARE MGMT STAFF 1ST 20: CPT | Mod: S$GLB,,, | Performed by: INTERNAL MEDICINE

## 2022-09-28 NOTE — PROGRESS NOTES
The patient's electronic chart was reviewed during this month. The patient's medical, functional, and psychosocial needs were assessed. Need for Home health care, PT, OT, , psychiatric care, and hospice was assessed. All preventative care measures were reviewed and updated. All medications were reviewed and reconciled. Potential drug interactions and medication adherence was reviewed. Prescriptions were renewed as appropriate. Education was provided to the patient and/or caregiver as needed, and all questions were answered. Over 20 minutes were spent providing these non-face-to-face services during this calendar month.     Refilled    meloxicam (MOBIC) 15 MG tablet 30 tablet 1 9/6/2022  No   Sig - Route: TAKE 1 TABLET (15 MG TOTAL) BY MOUTH DAILY AS NEEDED FOR PAIN. TAKE WITH FOOD. - Oral   Sent to pharmacy as: meloxicam (MOBIC) 15 MG tablet   Class: Normal   Order: [id number]   Date/Time Signed: 9/6/2022 10:41       E-Prescribing Status: Receipt confirmed by pharmacy (9/6/2022 10:41 AM CDT)

## 2022-12-22 ENCOUNTER — PES CALL (OUTPATIENT)
Dept: ADMINISTRATIVE | Facility: CLINIC | Age: 72
End: 2022-12-22
Payer: MEDICARE

## 2023-01-10 ENCOUNTER — OFFICE VISIT (OUTPATIENT)
Dept: INTERNAL MEDICINE | Facility: CLINIC | Age: 73
End: 2023-01-10
Attending: INTERNAL MEDICINE
Payer: MEDICARE

## 2023-01-10 ENCOUNTER — LAB VISIT (OUTPATIENT)
Dept: LAB | Facility: OTHER | Age: 73
End: 2023-01-10
Attending: INTERNAL MEDICINE
Payer: MEDICARE

## 2023-01-10 VITALS
HEART RATE: 100 BPM | HEIGHT: 61 IN | WEIGHT: 160 LBS | BODY MASS INDEX: 30.21 KG/M2 | SYSTOLIC BLOOD PRESSURE: 118 MMHG | DIASTOLIC BLOOD PRESSURE: 70 MMHG | OXYGEN SATURATION: 97 %

## 2023-01-10 DIAGNOSIS — R79.89 OTHER SPECIFIED ABNORMAL FINDINGS OF BLOOD CHEMISTRY: ICD-10-CM

## 2023-01-10 DIAGNOSIS — Z13.31 SCREENING FOR DEPRESSION: ICD-10-CM

## 2023-01-10 DIAGNOSIS — D50.8 OTHER IRON DEFICIENCY ANEMIA: ICD-10-CM

## 2023-01-10 DIAGNOSIS — E66.9 OBESITY (BMI 30.0-34.9): Primary | ICD-10-CM

## 2023-01-10 DIAGNOSIS — E78.9 DISORDER OF LIPID METABOLISM: ICD-10-CM

## 2023-01-10 DIAGNOSIS — M19.031 PRIMARY OSTEOARTHRITIS OF RIGHT WRIST: ICD-10-CM

## 2023-01-10 DIAGNOSIS — D51.0 PERNICIOUS ANEMIA: ICD-10-CM

## 2023-01-10 DIAGNOSIS — E03.9 HYPOTHYROIDISM, UNSPECIFIED TYPE: ICD-10-CM

## 2023-01-10 DIAGNOSIS — E78.9 DISORDER OF LIPID METABOLISM: Primary | ICD-10-CM

## 2023-01-10 DIAGNOSIS — Z13.39 SCREENING FOR ALCOHOLISM: ICD-10-CM

## 2023-01-10 DIAGNOSIS — E55.9 VITAMIN D DEFICIENCY: ICD-10-CM

## 2023-01-10 DIAGNOSIS — Z13.89 SCREENING FOR OBESITY: ICD-10-CM

## 2023-01-10 LAB
ALBUMIN SERPL BCP-MCNC: 4 G/DL (ref 3.5–5.2)
ALP SERPL-CCNC: 90 U/L (ref 55–135)
ALT SERPL W/O P-5'-P-CCNC: 16 U/L (ref 10–44)
ANION GAP SERPL CALC-SCNC: 10 MMOL/L (ref 8–16)
AST SERPL-CCNC: 18 U/L (ref 10–40)
BASOPHILS # BLD AUTO: 0.03 K/UL (ref 0–0.2)
BASOPHILS NFR BLD: 0.4 % (ref 0–1.9)
BILIRUB SERPL-MCNC: 0.5 MG/DL (ref 0.1–1)
BUN SERPL-MCNC: 18 MG/DL (ref 8–23)
CALCIUM SERPL-MCNC: 9.7 MG/DL (ref 8.7–10.5)
CHLORIDE SERPL-SCNC: 104 MMOL/L (ref 95–110)
CHOLEST SERPL-MCNC: 201 MG/DL (ref 120–199)
CHOLEST/HDLC SERPL: 3.4 {RATIO} (ref 2–5)
CO2 SERPL-SCNC: 24 MMOL/L (ref 23–29)
CREAT SERPL-MCNC: 0.7 MG/DL (ref 0.5–1.4)
DIFFERENTIAL METHOD: ABNORMAL
EOSINOPHIL # BLD AUTO: 0.1 K/UL (ref 0–0.5)
EOSINOPHIL NFR BLD: 1 % (ref 0–8)
ERYTHROCYTE [DISTWIDTH] IN BLOOD BY AUTOMATED COUNT: 12.4 % (ref 11.5–14.5)
EST. GFR  (NO RACE VARIABLE): >60 ML/MIN/1.73 M^2
ESTIMATED AVG GLUCOSE: 100 MG/DL (ref 68–131)
GLUCOSE SERPL-MCNC: 100 MG/DL (ref 70–110)
HBA1C MFR BLD: 5.1 % (ref 4–5.6)
HCT VFR BLD AUTO: 42.2 % (ref 37–48.5)
HDLC SERPL-MCNC: 60 MG/DL (ref 40–75)
HDLC SERPL: 29.9 % (ref 20–50)
HGB BLD-MCNC: 14.1 G/DL (ref 12–16)
IMM GRANULOCYTES # BLD AUTO: 0.02 K/UL (ref 0–0.04)
IMM GRANULOCYTES NFR BLD AUTO: 0.3 % (ref 0–0.5)
LDLC SERPL CALC-MCNC: 124.4 MG/DL (ref 63–159)
LYMPHOCYTES # BLD AUTO: 2.9 K/UL (ref 1–4.8)
LYMPHOCYTES NFR BLD: 42.3 % (ref 18–48)
MCH RBC QN AUTO: 31.8 PG (ref 27–31)
MCHC RBC AUTO-ENTMCNC: 33.4 G/DL (ref 32–36)
MCV RBC AUTO: 95 FL (ref 82–98)
MONOCYTES # BLD AUTO: 0.6 K/UL (ref 0.3–1)
MONOCYTES NFR BLD: 9.5 % (ref 4–15)
NEUTROPHILS # BLD AUTO: 3.1 K/UL (ref 1.8–7.7)
NEUTROPHILS NFR BLD: 46.5 % (ref 38–73)
NONHDLC SERPL-MCNC: 141 MG/DL
NRBC BLD-RTO: 0 /100 WBC
PLATELET # BLD AUTO: 305 K/UL (ref 150–450)
PMV BLD AUTO: 8.5 FL (ref 9.2–12.9)
POTASSIUM SERPL-SCNC: 4 MMOL/L (ref 3.5–5.1)
PROT SERPL-MCNC: 7.2 G/DL (ref 6–8.4)
RBC # BLD AUTO: 4.44 M/UL (ref 4–5.4)
SODIUM SERPL-SCNC: 138 MMOL/L (ref 136–145)
TRIGL SERPL-MCNC: 83 MG/DL (ref 30–150)
TSH SERPL DL<=0.005 MIU/L-ACNC: 1.82 UIU/ML (ref 0.4–4)
WBC # BLD AUTO: 6.76 K/UL (ref 3.9–12.7)

## 2023-01-10 PROCEDURE — 1160F RVW MEDS BY RX/DR IN RCRD: CPT | Mod: CPTII,S$GLB,, | Performed by: INTERNAL MEDICINE

## 2023-01-10 PROCEDURE — 99213 PR OFFICE/OUTPT VISIT, EST, LEVL III, 20-29 MIN: ICD-10-PCS | Mod: S$GLB,,, | Performed by: INTERNAL MEDICINE

## 2023-01-10 PROCEDURE — 3074F PR MOST RECENT SYSTOLIC BLOOD PRESSURE < 130 MM HG: ICD-10-PCS | Mod: CPTII,S$GLB,, | Performed by: INTERNAL MEDICINE

## 2023-01-10 PROCEDURE — 83036 HEMOGLOBIN GLYCOSYLATED A1C: CPT | Mod: HCNC | Performed by: INTERNAL MEDICINE

## 2023-01-10 PROCEDURE — 84443 ASSAY THYROID STIM HORMONE: CPT | Mod: HCNC | Performed by: INTERNAL MEDICINE

## 2023-01-10 PROCEDURE — G0447 BEHAVIOR COUNSEL OBESITY 15M: HCPCS | Mod: 59,S$GLB,, | Performed by: INTERNAL MEDICINE

## 2023-01-10 PROCEDURE — 3078F DIAST BP <80 MM HG: CPT | Mod: CPTII,S$GLB,, | Performed by: INTERNAL MEDICINE

## 2023-01-10 PROCEDURE — 3044F PR MOST RECENT HEMOGLOBIN A1C LEVEL <7.0%: ICD-10-PCS | Mod: CPTII,S$GLB,, | Performed by: INTERNAL MEDICINE

## 2023-01-10 PROCEDURE — 80061 LIPID PANEL: CPT | Mod: HCNC | Performed by: INTERNAL MEDICINE

## 2023-01-10 PROCEDURE — G0444 DEPRESSION SCREEN ANNUAL: HCPCS | Mod: 59,S$GLB,, | Performed by: INTERNAL MEDICINE

## 2023-01-10 PROCEDURE — G0442 PR  ALCOHOL SCREENING: ICD-10-PCS | Mod: 59,S$GLB,, | Performed by: INTERNAL MEDICINE

## 2023-01-10 PROCEDURE — 3044F HG A1C LEVEL LT 7.0%: CPT | Mod: CPTII,S$GLB,, | Performed by: INTERNAL MEDICINE

## 2023-01-10 PROCEDURE — 99213 OFFICE O/P EST LOW 20 MIN: CPT | Mod: S$GLB,,, | Performed by: INTERNAL MEDICINE

## 2023-01-10 PROCEDURE — G0442 ANNUAL ALCOHOL SCREEN 15 MIN: HCPCS | Mod: 59,S$GLB,, | Performed by: INTERNAL MEDICINE

## 2023-01-10 PROCEDURE — G0444 PR DEPRESSION SCREENING: ICD-10-PCS | Mod: 59,S$GLB,, | Performed by: INTERNAL MEDICINE

## 2023-01-10 PROCEDURE — 3008F BODY MASS INDEX DOCD: CPT | Mod: CPTII,S$GLB,, | Performed by: INTERNAL MEDICINE

## 2023-01-10 PROCEDURE — 1159F MED LIST DOCD IN RCRD: CPT | Mod: CPTII,S$GLB,, | Performed by: INTERNAL MEDICINE

## 2023-01-10 PROCEDURE — 3074F SYST BP LT 130 MM HG: CPT | Mod: CPTII,S$GLB,, | Performed by: INTERNAL MEDICINE

## 2023-01-10 PROCEDURE — G0447 PR OBESITY COUNSELING: ICD-10-PCS | Mod: 59,S$GLB,, | Performed by: INTERNAL MEDICINE

## 2023-01-10 PROCEDURE — 3008F PR BODY MASS INDEX (BMI) DOCUMENTED: ICD-10-PCS | Mod: CPTII,S$GLB,, | Performed by: INTERNAL MEDICINE

## 2023-01-10 PROCEDURE — 1160F PR REVIEW ALL MEDS BY PRESCRIBER/CLIN PHARMACIST DOCUMENTED: ICD-10-PCS | Mod: CPTII,S$GLB,, | Performed by: INTERNAL MEDICINE

## 2023-01-10 PROCEDURE — 80053 COMPREHEN METABOLIC PANEL: CPT | Mod: HCNC | Performed by: INTERNAL MEDICINE

## 2023-01-10 PROCEDURE — 1159F PR MEDICATION LIST DOCUMENTED IN MEDICAL RECORD: ICD-10-PCS | Mod: CPTII,S$GLB,, | Performed by: INTERNAL MEDICINE

## 2023-01-10 PROCEDURE — 3078F PR MOST RECENT DIASTOLIC BLOOD PRESSURE < 80 MM HG: ICD-10-PCS | Mod: CPTII,S$GLB,, | Performed by: INTERNAL MEDICINE

## 2023-01-10 PROCEDURE — 36415 COLL VENOUS BLD VENIPUNCTURE: CPT | Mod: HCNC | Performed by: INTERNAL MEDICINE

## 2023-01-10 PROCEDURE — 85025 COMPLETE CBC W/AUTO DIFF WBC: CPT | Mod: HCNC | Performed by: INTERNAL MEDICINE

## 2023-01-10 NOTE — PATIENT INSTRUCTIONS

## 2023-01-12 NOTE — PROGRESS NOTES
Subjective:       Patient ID: Chely Kramer is a 72 y.o. female.    Chief Complaint: Annual Exam    She has lost 6 lb through a better diet.    Review of Systems   Constitutional: Negative.    Respiratory:  Negative for shortness of breath.    Cardiovascular:  Negative for chest pain.       Objective:      Physical Exam  Vitals and nursing note reviewed.   Constitutional:       Appearance: She is well-developed.   HENT:      Head: Normocephalic.   Eyes:      Pupils: Pupils are equal, round, and reactive to light.   Cardiovascular:      Rate and Rhythm: Normal rate and regular rhythm.      Heart sounds: Normal heart sounds.   Pulmonary:      Effort: Pulmonary effort is normal.   Neurological:      Mental Status: She is alert.       Assessment:       Problem List Items Addressed This Visit          Unprioritized    Obesity (BMI 30.0-34.9) - Primary         Plan:       Per orders and D/C instructions.    She will continue diet for her obesity.  Check routine labs.  Meloxicam p.r.n. DJD.  She only wants to get a mammogram every 2 years, so she is not due until February 2024.    Screening: The patient was screened for depression with the PHQ2 questionnaire and possible health consequences were discussed with the patient, who understands (15 minutes spent).       The patient was screened for the misuse of alcohol, by asking the number of drinks per average week, and if pt has had more than 4 drinks (more than 3 for women and elderly) in 1 day within the past year. The health and legal consequences of misuse were discussed (15 minutes spent).       The patient was screened for obesity (BMI>30), Since the current BMI is > 30, the possible consequences of obesity, as well as the benefits of diet, exercise, and weight loss were discussed. Any behavioral risks were identified, and methods to achieve appropriate treatment goals were discussed (15 minutes spent).

## 2023-05-29 NOTE — ASSESSMENT & PLAN NOTE
Take Augmentin with food as prescribed  Tylenol/motrin as needed for pain  Salt water gargles  Ice over area  Follow up with PCP  Follow up with dentist     Dental Abscess   AMBULATORY CARE:   A dental abscess  is a collection of pus in or around a tooth  A dental abscess is caused by bacteria  The bacteria can enter the tooth when the enamel (outer part of the tooth) is damaged by tooth decay  Bacteria can also enter the tooth through a chip in the tooth or a cut in the gum  Food particles that are stuck between the teeth for a long time may also lead to an abscess  Common signs and symptoms:   Toothache, a loose tooth, or a tooth that is very sensitive to pressure or temperature    Bad breath, unpleasant taste, and drooling    Fever    Pain, redness, and swelling of the gums, or swelling of your face and neck    Pain when you open or close your mouth    Trouble opening your mouth    Seek care immediately if:   You have severe pain in your tooth or jaw  You have trouble breathing because of pain or swelling  Call your doctor if:   Your symptoms get worse, even after treatment  Your mouth is bleeding  You cannot eat or drink because of pain or swelling  Your abscess returns  You have an injury that causes a crack in your tooth  You have questions or concerns about your condition or care  Treatment:  You may  need any of the following:  Medicines  may be given to treat a bacterial infection and decrease pain  Incision and drainage  is a cut in the abscess to allow the pus to drain  A sample of fluid may be collected from your abscess  The fluid is sent to a lab and tested for bacteria  Ask your healthcare provider for more information  A root canal  is a procedure to remove the bacteria and prevent more infection  It is usually done after an incision and drainage   A filling or crown will be placed over the tooth after you have healed from your root canal      Tooth replace     removal  may be needed if the infection affects deeper tissues  This is usually done after an incision and drainage  Self-care:   Rinse your mouth every 2 hours with salt water  This will help keep the area clean  Gently brush your teeth twice a day with a soft tooth brush  This will help keep the area clean  Eat soft foods as directed  Soft foods may cause less pain  Examples include applesauce, yogurt, and cooked pasta  Ask your healthcare provider how long to follow this instruction  Apply a warm compress to your tooth or gum  Use a cotton ball or gauze soaked in warm water  Remove the compress in 10 minutes or when it becomes cool  Repeat 3 times a day  Prevent another abscess:   Brush your teeth at least 2 times a day  with fluoride toothpaste  Use dental floss at least once a day  to clean between your teeth  Rinse your mouth with water or mouthwash  after meals and snacks  Chew sugarless gum  Avoid sugary and starchy food that can stick between your teeth  Limit drinks high in sugar, such as soda or fruit juice  See your dentist every 6 months  for dental cleanings and oral exams  Follow up with your doctor or dentist as directed: Your healthcare provider will need to check your teeth and gums  Write down your questions so you remember to ask them during your visits  © Copyright [employer] 2022 Information is for End User's use only and may not be sold, redistributed or otherwise used for commercial purposes  All illustrations and images included in CareNotes® are the copyrighted property of A [employer] M [employer] Inc  or Fredi Wong   The above information is an  only  It is not intended as medical advice for individual conditions or treatments  Talk to your doctor, nurse or pharmacist before following any medical regimen to see if it is safe and effective for you

## 2023-09-27 DIAGNOSIS — Z78.0 MENOPAUSE: ICD-10-CM

## 2023-10-24 ENCOUNTER — OFFICE VISIT (OUTPATIENT)
Dept: INTERNAL MEDICINE | Facility: CLINIC | Age: 73
End: 2023-10-24
Attending: INTERNAL MEDICINE
Payer: MEDICARE

## 2023-10-24 VITALS
WEIGHT: 163 LBS | BODY MASS INDEX: 30.78 KG/M2 | DIASTOLIC BLOOD PRESSURE: 64 MMHG | SYSTOLIC BLOOD PRESSURE: 118 MMHG | HEART RATE: 92 BPM | HEIGHT: 61 IN | OXYGEN SATURATION: 97 %

## 2023-10-24 DIAGNOSIS — K57.92 DIVERTICULITIS: Primary | ICD-10-CM

## 2023-10-24 DIAGNOSIS — G47.00 INSOMNIA, UNSPECIFIED TYPE: ICD-10-CM

## 2023-10-24 DIAGNOSIS — M19.90 ARTHRITIS: ICD-10-CM

## 2023-10-24 PROCEDURE — 3074F SYST BP LT 130 MM HG: CPT | Mod: CPTII,S$GLB,, | Performed by: INTERNAL MEDICINE

## 2023-10-24 PROCEDURE — 3044F PR MOST RECENT HEMOGLOBIN A1C LEVEL <7.0%: ICD-10-PCS | Mod: CPTII,S$GLB,, | Performed by: INTERNAL MEDICINE

## 2023-10-24 PROCEDURE — 90694 FLU VACCINE - QUADRIVALENT - ADJUVANTED: ICD-10-PCS | Mod: S$GLB,,, | Performed by: INTERNAL MEDICINE

## 2023-10-24 PROCEDURE — 1160F PR REVIEW ALL MEDS BY PRESCRIBER/CLIN PHARMACIST DOCUMENTED: ICD-10-PCS | Mod: CPTII,S$GLB,, | Performed by: INTERNAL MEDICINE

## 2023-10-24 PROCEDURE — 90694 VACC AIIV4 NO PRSRV 0.5ML IM: CPT | Mod: S$GLB,,, | Performed by: INTERNAL MEDICINE

## 2023-10-24 PROCEDURE — 1159F PR MEDICATION LIST DOCUMENTED IN MEDICAL RECORD: ICD-10-PCS | Mod: CPTII,S$GLB,, | Performed by: INTERNAL MEDICINE

## 2023-10-24 PROCEDURE — 3008F PR BODY MASS INDEX (BMI) DOCUMENTED: ICD-10-PCS | Mod: CPTII,S$GLB,, | Performed by: INTERNAL MEDICINE

## 2023-10-24 PROCEDURE — G0008 FLU VACCINE - QUADRIVALENT - ADJUVANTED: ICD-10-PCS | Mod: S$GLB,,, | Performed by: INTERNAL MEDICINE

## 2023-10-24 PROCEDURE — 3008F BODY MASS INDEX DOCD: CPT | Mod: CPTII,S$GLB,, | Performed by: INTERNAL MEDICINE

## 2023-10-24 PROCEDURE — 99214 OFFICE O/P EST MOD 30 MIN: CPT | Mod: 25,S$GLB,, | Performed by: INTERNAL MEDICINE

## 2023-10-24 PROCEDURE — 3044F HG A1C LEVEL LT 7.0%: CPT | Mod: CPTII,S$GLB,, | Performed by: INTERNAL MEDICINE

## 2023-10-24 PROCEDURE — 3074F PR MOST RECENT SYSTOLIC BLOOD PRESSURE < 130 MM HG: ICD-10-PCS | Mod: CPTII,S$GLB,, | Performed by: INTERNAL MEDICINE

## 2023-10-24 PROCEDURE — 99214 PR OFFICE/OUTPT VISIT, EST, LEVL IV, 30-39 MIN: ICD-10-PCS | Mod: 25,S$GLB,, | Performed by: INTERNAL MEDICINE

## 2023-10-24 PROCEDURE — G0008 ADMIN INFLUENZA VIRUS VAC: HCPCS | Mod: S$GLB,,, | Performed by: INTERNAL MEDICINE

## 2023-10-24 PROCEDURE — 1160F RVW MEDS BY RX/DR IN RCRD: CPT | Mod: CPTII,S$GLB,, | Performed by: INTERNAL MEDICINE

## 2023-10-24 PROCEDURE — 3078F DIAST BP <80 MM HG: CPT | Mod: CPTII,S$GLB,, | Performed by: INTERNAL MEDICINE

## 2023-10-24 PROCEDURE — 1159F MED LIST DOCD IN RCRD: CPT | Mod: CPTII,S$GLB,, | Performed by: INTERNAL MEDICINE

## 2023-10-24 PROCEDURE — 3078F PR MOST RECENT DIASTOLIC BLOOD PRESSURE < 80 MM HG: ICD-10-PCS | Mod: CPTII,S$GLB,, | Performed by: INTERNAL MEDICINE

## 2023-10-24 RX ORDER — MELOXICAM 15 MG/1
15 TABLET ORAL DAILY PRN
Qty: 30 TABLET | Refills: 1 | Status: SHIPPED | OUTPATIENT
Start: 2023-10-24 | End: 2024-02-15

## 2023-10-24 RX ORDER — TRAZODONE HYDROCHLORIDE 50 MG/1
TABLET ORAL
Qty: 30 TABLET | Refills: 2 | Status: SHIPPED | OUTPATIENT
Start: 2023-10-24

## 2023-10-24 NOTE — PROGRESS NOTES
Subjective     Patient ID: Chely Kramer is a 73 y.o. female.    Chief Complaint: Follow-up (Believes she is having a Diverticulitis flare having gut pain, having issues sleeping Melatonin does not help, would like a Refill on Meloxicam )    She has been getting diverticulitis once or twice per year.  She tries not to take antibiotics.  She had an episode about 1 week ago.  She did have some night sweats.  She feels much better now.    Her cat  recently.  She is had some difficulty sleeping.  Melatonin does not seem to help.    Follow-up  Pertinent negatives include no chest pain.     Review of Systems   Constitutional: Negative.    Respiratory:  Negative for shortness of breath.    Cardiovascular:  Negative for chest pain.   Psychiatric/Behavioral:  Positive for sleep disturbance.           Objective     Physical Exam  Vitals and nursing note reviewed.   Constitutional:       Appearance: She is well-developed.   HENT:      Head: Normocephalic.   Eyes:      Pupils: Pupils are equal, round, and reactive to light.   Cardiovascular:      Rate and Rhythm: Normal rate and regular rhythm.      Heart sounds: Normal heart sounds.   Pulmonary:      Effort: Pulmonary effort is normal.   Abdominal:      General: Abdomen is flat. Bowel sounds are normal.      Palpations: Abdomen is soft.      Tenderness: There is no abdominal tenderness. There is no guarding or rebound.   Neurological:      Mental Status: She is alert.            Assessment and Plan     1. Diverticulitis    2. Insomnia, unspecified type    3. Arthritis    Other orders  -     Influenza - Quadrivalent (Adjuvanted)  -     meloxicam (MOBIC) 15 MG tablet; Take 1 tablet (15 mg total) by mouth daily as needed for Pain. Take with food.  Dispense: 30 tablet; Refill: 1  -     traZODone (DESYREL) 50 MG tablet; Take 1/2-1 tablet nightly as needed for insomnia.  Dispense: 30 tablet; Refill: 2        Per orders and D/C instructions.  She will monitor her symptoms of  diverticulitis, which are improving.  If she has fever or worsening symptoms she will notify the office and start antibiotics.  Meloxicam as needed for arthritis.  Trazodone as needed for insomnia.  Flu shot today.    Between 30 and 39 min of total time for evaluation and management services were spent on the patient today.  The medical problems and treatment options were discussed, and all questions were answered.             Follow up in 3 months (on 1/10/2024).

## 2024-01-18 ENCOUNTER — OFFICE VISIT (OUTPATIENT)
Dept: INTERNAL MEDICINE | Facility: CLINIC | Age: 74
End: 2024-01-18
Attending: INTERNAL MEDICINE
Payer: MEDICARE

## 2024-01-18 ENCOUNTER — LAB VISIT (OUTPATIENT)
Dept: LAB | Facility: OTHER | Age: 74
End: 2024-01-18
Attending: INTERNAL MEDICINE
Payer: MEDICARE

## 2024-01-18 VITALS
DIASTOLIC BLOOD PRESSURE: 64 MMHG | OXYGEN SATURATION: 95 % | HEIGHT: 61 IN | HEART RATE: 86 BPM | WEIGHT: 164 LBS | SYSTOLIC BLOOD PRESSURE: 116 MMHG | BODY MASS INDEX: 30.96 KG/M2

## 2024-01-18 DIAGNOSIS — D51.0 PERNICIOUS ANEMIA: ICD-10-CM

## 2024-01-18 DIAGNOSIS — Z13.89 SCREENING FOR OBESITY: ICD-10-CM

## 2024-01-18 DIAGNOSIS — E66.9 OBESITY (BMI 30.0-34.9): Primary | ICD-10-CM

## 2024-01-18 DIAGNOSIS — E03.9 HYPOTHYROIDISM, UNSPECIFIED TYPE: ICD-10-CM

## 2024-01-18 DIAGNOSIS — R79.89 OTHER SPECIFIED ABNORMAL FINDINGS OF BLOOD CHEMISTRY: ICD-10-CM

## 2024-01-18 DIAGNOSIS — Z85.828 HX OF BASAL CELL CARCINOMA: ICD-10-CM

## 2024-01-18 DIAGNOSIS — E55.9 VITAMIN D DEFICIENCY: ICD-10-CM

## 2024-01-18 DIAGNOSIS — E78.9 DISORDER OF LIPID METABOLISM: Primary | ICD-10-CM

## 2024-01-18 DIAGNOSIS — D50.8 OTHER IRON DEFICIENCY ANEMIA: ICD-10-CM

## 2024-01-18 DIAGNOSIS — M19.031 PRIMARY OSTEOARTHRITIS OF RIGHT WRIST: ICD-10-CM

## 2024-01-18 DIAGNOSIS — Z12.31 BREAST CANCER SCREENING BY MAMMOGRAM: ICD-10-CM

## 2024-01-18 DIAGNOSIS — Z78.0 MENOPAUSE: ICD-10-CM

## 2024-01-18 DIAGNOSIS — K57.92 DIVERTICULITIS: ICD-10-CM

## 2024-01-18 DIAGNOSIS — Z13.31 SCREENING FOR DEPRESSION: ICD-10-CM

## 2024-01-18 DIAGNOSIS — Z13.39 SCREENING FOR ALCOHOLISM: ICD-10-CM

## 2024-01-18 DIAGNOSIS — E78.9 DISORDER OF LIPID METABOLISM: ICD-10-CM

## 2024-01-18 LAB
25(OH)D3+25(OH)D2 SERPL-MCNC: 30 NG/ML (ref 30–96)
ALBUMIN SERPL BCP-MCNC: 4.1 G/DL (ref 3.5–5.2)
ALP SERPL-CCNC: 92 U/L (ref 55–135)
ALT SERPL W/O P-5'-P-CCNC: 17 U/L (ref 10–44)
ANION GAP SERPL CALC-SCNC: 8 MMOL/L (ref 8–16)
AST SERPL-CCNC: 18 U/L (ref 10–40)
BASOPHILS # BLD AUTO: 0.03 K/UL (ref 0–0.2)
BASOPHILS NFR BLD: 0.4 % (ref 0–1.9)
BILIRUB SERPL-MCNC: 0.3 MG/DL (ref 0.1–1)
BUN SERPL-MCNC: 19 MG/DL (ref 8–23)
CALCIUM SERPL-MCNC: 10 MG/DL (ref 8.7–10.5)
CHLORIDE SERPL-SCNC: 103 MMOL/L (ref 95–110)
CHOLEST SERPL-MCNC: 185 MG/DL (ref 120–199)
CHOLEST/HDLC SERPL: 3 {RATIO} (ref 2–5)
CO2 SERPL-SCNC: 28 MMOL/L (ref 23–29)
CREAT SERPL-MCNC: 0.7 MG/DL (ref 0.5–1.4)
DIFFERENTIAL METHOD BLD: ABNORMAL
EOSINOPHIL # BLD AUTO: 0.1 K/UL (ref 0–0.5)
EOSINOPHIL NFR BLD: 1.1 % (ref 0–8)
ERYTHROCYTE [DISTWIDTH] IN BLOOD BY AUTOMATED COUNT: 12.2 % (ref 11.5–14.5)
EST. GFR  (NO RACE VARIABLE): >60 ML/MIN/1.73 M^2
ESTIMATED AVG GLUCOSE: 100 MG/DL (ref 68–131)
GLUCOSE SERPL-MCNC: 103 MG/DL (ref 70–110)
HBA1C MFR BLD: 5.1 % (ref 4–5.6)
HCT VFR BLD AUTO: 42.1 % (ref 37–48.5)
HDLC SERPL-MCNC: 61 MG/DL (ref 40–75)
HDLC SERPL: 33 % (ref 20–50)
HGB BLD-MCNC: 14.2 G/DL (ref 12–16)
IMM GRANULOCYTES # BLD AUTO: 0.02 K/UL (ref 0–0.04)
IMM GRANULOCYTES NFR BLD AUTO: 0.3 % (ref 0–0.5)
LDLC SERPL CALC-MCNC: 103.8 MG/DL (ref 63–159)
LYMPHOCYTES # BLD AUTO: 2.8 K/UL (ref 1–4.8)
LYMPHOCYTES NFR BLD: 38.4 % (ref 18–48)
MCH RBC QN AUTO: 31.8 PG (ref 27–31)
MCHC RBC AUTO-ENTMCNC: 33.7 G/DL (ref 32–36)
MCV RBC AUTO: 94 FL (ref 82–98)
MONOCYTES # BLD AUTO: 0.7 K/UL (ref 0.3–1)
MONOCYTES NFR BLD: 9.5 % (ref 4–15)
NEUTROPHILS # BLD AUTO: 3.6 K/UL (ref 1.8–7.7)
NEUTROPHILS NFR BLD: 50.3 % (ref 38–73)
NONHDLC SERPL-MCNC: 124 MG/DL
NRBC BLD-RTO: 0 /100 WBC
PLATELET # BLD AUTO: 315 K/UL (ref 150–450)
PMV BLD AUTO: 8.4 FL (ref 9.2–12.9)
POTASSIUM SERPL-SCNC: 4.3 MMOL/L (ref 3.5–5.1)
PROT SERPL-MCNC: 7.5 G/DL (ref 6–8.4)
RBC # BLD AUTO: 4.46 M/UL (ref 4–5.4)
SODIUM SERPL-SCNC: 139 MMOL/L (ref 136–145)
TRIGL SERPL-MCNC: 101 MG/DL (ref 30–150)
TSH SERPL DL<=0.005 MIU/L-ACNC: 2.65 UIU/ML (ref 0.4–4)
VIT B12 SERPL-MCNC: 615 PG/ML (ref 210–950)
WBC # BLD AUTO: 7.18 K/UL (ref 3.9–12.7)

## 2024-01-18 PROCEDURE — 36415 COLL VENOUS BLD VENIPUNCTURE: CPT | Mod: HCNC | Performed by: INTERNAL MEDICINE

## 2024-01-18 PROCEDURE — 82607 VITAMIN B-12: CPT | Mod: HCNC | Performed by: INTERNAL MEDICINE

## 2024-01-18 PROCEDURE — 3078F DIAST BP <80 MM HG: CPT | Mod: CPTII,S$GLB,, | Performed by: INTERNAL MEDICINE

## 2024-01-18 PROCEDURE — 85025 COMPLETE CBC W/AUTO DIFF WBC: CPT | Mod: HCNC | Performed by: INTERNAL MEDICINE

## 2024-01-18 PROCEDURE — G0447 BEHAVIOR COUNSEL OBESITY 15M: HCPCS | Mod: 59,S$GLB,, | Performed by: INTERNAL MEDICINE

## 2024-01-18 PROCEDURE — 1160F RVW MEDS BY RX/DR IN RCRD: CPT | Mod: CPTII,S$GLB,, | Performed by: INTERNAL MEDICINE

## 2024-01-18 PROCEDURE — 84443 ASSAY THYROID STIM HORMONE: CPT | Mod: HCNC | Performed by: INTERNAL MEDICINE

## 2024-01-18 PROCEDURE — G0442 ANNUAL ALCOHOL SCREEN 15 MIN: HCPCS | Mod: 59,S$GLB,, | Performed by: INTERNAL MEDICINE

## 2024-01-18 PROCEDURE — 3008F BODY MASS INDEX DOCD: CPT | Mod: CPTII,S$GLB,, | Performed by: INTERNAL MEDICINE

## 2024-01-18 PROCEDURE — 83036 HEMOGLOBIN GLYCOSYLATED A1C: CPT | Mod: HCNC | Performed by: INTERNAL MEDICINE

## 2024-01-18 PROCEDURE — 1159F MED LIST DOCD IN RCRD: CPT | Mod: CPTII,S$GLB,, | Performed by: INTERNAL MEDICINE

## 2024-01-18 PROCEDURE — 99214 OFFICE O/P EST MOD 30 MIN: CPT | Mod: 25,S$GLB,, | Performed by: INTERNAL MEDICINE

## 2024-01-18 PROCEDURE — 82306 VITAMIN D 25 HYDROXY: CPT | Mod: HCNC | Performed by: INTERNAL MEDICINE

## 2024-01-18 PROCEDURE — 3074F SYST BP LT 130 MM HG: CPT | Mod: CPTII,S$GLB,, | Performed by: INTERNAL MEDICINE

## 2024-01-18 PROCEDURE — G0444 DEPRESSION SCREEN ANNUAL: HCPCS | Mod: 59,S$GLB,, | Performed by: INTERNAL MEDICINE

## 2024-01-18 PROCEDURE — 80061 LIPID PANEL: CPT | Mod: HCNC | Performed by: INTERNAL MEDICINE

## 2024-01-18 PROCEDURE — 80053 COMPREHEN METABOLIC PANEL: CPT | Mod: HCNC | Performed by: INTERNAL MEDICINE

## 2024-01-18 RX ORDER — BRIMONIDINE TARTRATE 2 MG/ML
1 SOLUTION/ DROPS OPHTHALMIC 2 TIMES DAILY
COMMUNITY
Start: 2024-01-11

## 2024-01-18 RX ORDER — LATANOPROST 50 UG/ML
1 SOLUTION/ DROPS OPHTHALMIC NIGHTLY
COMMUNITY
Start: 2024-01-11

## 2024-01-18 NOTE — PROGRESS NOTES
Subjective     Patient ID: Chely Kramer is a 73 y.o. female.    Chief Complaint: Annual Exam (Does not like trazadone causes dry mouth and does not help with staying asleep, Refill Trazodone and Meloxicam, having Diverticulitis flare )    She has been working more than usual.  She is having a mild flare of diverticulitis the past 2 days.  She has not been eating much.  It feels normal today.      Review of Systems   Constitutional: Negative.    Respiratory:  Negative for shortness of breath.    Cardiovascular:  Negative for chest pain.   Gastrointestinal:  Positive for abdominal pain.          Objective     Physical Exam  Vitals and nursing note reviewed.   Constitutional:       Appearance: She is well-developed.   HENT:      Head: Normocephalic.   Eyes:      Pupils: Pupils are equal, round, and reactive to light.   Cardiovascular:      Rate and Rhythm: Normal rate and regular rhythm.      Heart sounds: Normal heart sounds.   Pulmonary:      Effort: Pulmonary effort is normal.   Abdominal:      General: Abdomen is flat. Bowel sounds are normal.      Palpations: Abdomen is soft.      Tenderness: There is no abdominal tenderness. There is no guarding or rebound.   Neurological:      Mental Status: She is alert.            Assessment and Plan     1. Obesity (BMI 30.0-34.9)    2. Hx of basal cell carcinoma  Overview:  Right cheek      3. Primary osteoarthritis of right wrist    4. Diverticulitis    5. Screening for depression    6. Screening for alcoholism    7. Screening for obesity    8. Breast cancer screening by mammogram  -     Mammo Digital Screening Bilat w/ Viral; Future; Expected date: 01/18/2024    9. Menopause  -     DXA Bone Density Axial Skeleton 1 or more sites        Plan:  Per orders and D/C instructions.  She will follow a low residue diet for her flare of diverticulitis.  She knows she can call my office and get antibiotics if needed.  She prefers not to take them.  Follow a low-carbohydrate diet low  in saturated fat for obesity.  Follow-up with derm for history of basal cell carcinoma.  Meloxicam as needed for wrist pain.  Mammogram and bone density scan ordered.  Check routine labs.    Screening: The patient was screened for depression with the PHQ2 questionnaire and possible health consequences were discussed with the patient, who understands (15 minutes spent).       The patient was screened for the misuse of alcohol, by asking the number of drinks per average week, and if pt has had more than 4 drinks (more than 3 for women and elderly) in 1 day within the past year. The health and legal consequences of misuse were discussed (15 minutes spent).       The patient was screened for obesity (BMI>30), Since the current BMI is > 30, the possible consequences of obesity, as well as the benefits of diet, exercise, and weight loss were discussed. Any behavioral risks were identified, and methods to achieve appropriate treatment goals were discussed (15 minutes spent).       Follow up in about 1 year (around 1/18/2025).     (4) rarely moist

## 2024-01-18 NOTE — PATIENT INSTRUCTIONS
If they do not call you and schedule within 2 business days, then please call [facility] radiology at [phone] to schedule your radiology test(s).  Mammogram and bone density scan.    Tips for Healthy Living and Routine Preventative Care - 2024                                                            (These guidelines are intended for healthy adults)      1. Exercise  Exercise aerobically with a target heart rate of (220-age) x 0.8  Exercise 30-45 minutes on most days of the week    2. Diet and Supplements- All supplements can be obtained through a varied, healthy diet   Calcium: 1,000 - 1,200 mg each day   8 oz milk or Calcium fortified O.J. = 300, 8 oz Yogurt = 400 mg, 1 oz of cheese =100-200 mg              8 oz Oatmeal = 215 mg, 3 oz [place] = 240 mg  Vitamin D: 800 iu each day- Can probably be obtained by 30 min. of direct sunlight    each day             3 oz. [place] = 800 iu,  3 oz. Tuna =150 iu, Milk or fortified O.J. = 120 iu  Fish oil: 1-2 grams each day or about 840 mg of EPA and DHA (Omega-3 fatty acids) each day             3 oz. [place]=2 grams,  3 oz. Tuna=1.3 grams,  3 oz. drained light Tuna= 0.25 grams  Folic acid 800 mcg each day for all women planning or capable of pregnancy    3. Lifestyle  Alcohol: 1 drink = 12 oz. domestic beer, 4 oz. wine, or 1 oz. hard (80 proof) liquor             Males: </= 14 drinks per week with no more than 4 in any one day             Females: </= 7 drinks per week with no more than 3 in any one day  Salt: 1.2 - 3 grams of Sodium each day.  Tobacco: Dont smoke, or quit smoking (discuss with your doctor)  Depression: If you feel depressed discuss with your doctor  Weight: Maintain a healthy body weight. Stay within 10% of:             Males: 106 lbs. + 6 lbs per inch height above 5 feet             Females: 100 lbs + 5 lbs per inch height above 5 feet    4. Routine tests  Blood pressure check at each visit, or at least once each year  HIV screening (one time) if  less than 65 years old  Hepatitis C screen (one time) if less than 80 years old  Cholesterol screening every 3 years starting at age 21  Glucose/Hemaglobin A1C check every 3 years starting at age 35.  TSH (thyroid screen) every 2 years starting at age 50  Colonoscopy at age 45, and repeat every 10 years until age 75. Consider screening until age 85. DNA stool test (Cologuard) every 3 years is an acceptable alternative.  Vision screen at age 65    Females:  Gyn exam with cervical HPV test every 3 years or Pap smear every three years starting at age 25                  Stop screening at age 65 if past 3 exams were normal                  No screening for women who have had a hysterectomy with removal of cervix  Mammogram every 1-2 years starting at age 40 until age 75  Consider continuing Mammograms every other year for those older than 75 with a life expectancy of more than 10 years  Bone density scan at about age 65    Males:  PSA screening annually at age 50, age 45 for  Americans, until age 75. Consider annual screening after age 75                   5. Immunizations  Influenza vaccine every year in the fall, especially if >50 or with a chronic disease  Consider getting a Covid booster vaccine annually  Tetanus/Diphtheria/Pertussis (Tdap) vaccine once (after the age of 18), then Tetanus/Diphtheria (Td) or Tdap vaccine every 10 years  Shingles (Shingrix) vaccine after age 50 and get a 2nd dose after 2-6 months  RSV (respiratory syncytial virus) vaccine after age 60  Pneumonia vaccine (Prevnar-20) at age 65       6. Advanced Directive/End of life care planning  You should consider having a signed document which informs physicians and family of your end of life care wishes.  You can go to [email or web address]. Under Step 1 click download AdobePDF and print.  This is the [place] physician order for scope of Treatment ([place]) form.  You can also request a blank copy of the [place] form from my  office.  Bring a copy of the signed document to my office so we can scan it in your medical chart.

## 2024-01-25 RX ORDER — METRONIDAZOLE 250 MG/1
250 TABLET ORAL 3 TIMES DAILY
Qty: 30 TABLET | Refills: 0 | Status: SHIPPED | OUTPATIENT
Start: 2024-01-25 | End: 2024-01-29

## 2024-01-25 RX ORDER — CIPROFLOXACIN 500 MG/1
500 TABLET ORAL 2 TIMES DAILY
Qty: 10 TABLET | Refills: 0 | Status: SHIPPED | OUTPATIENT
Start: 2024-01-25 | End: 2024-01-29

## 2024-01-29 ENCOUNTER — DOCUMENTATION ONLY (OUTPATIENT)
Dept: INTERNAL MEDICINE | Facility: CLINIC | Age: 74
End: 2024-01-29
Payer: MEDICARE

## 2024-01-29 DIAGNOSIS — K57.92 DIVERTICULITIS: ICD-10-CM

## 2024-01-29 DIAGNOSIS — E66.9 OBESITY (BMI 30.0-34.9): ICD-10-CM

## 2024-01-29 DIAGNOSIS — Z78.9 KNOWN MEDICAL PROBLEMS: Primary | ICD-10-CM

## 2024-01-29 PROCEDURE — 99491 CHRNC CARE MGMT PHYS 1ST 30: CPT | Mod: S$GLB,,, | Performed by: INTERNAL MEDICINE

## 2024-01-29 RX ORDER — AMOXICILLIN AND CLAVULANATE POTASSIUM 875; 125 MG/1; MG/1
1 TABLET, FILM COATED ORAL 2 TIMES DAILY
Qty: 14 TABLET | Refills: 0 | Status: SHIPPED | OUTPATIENT
Start: 2024-01-29 | End: 2024-02-15

## 2024-01-29 NOTE — PROGRESS NOTES
The patient's electronic chart was reviewed during this month. The patient's medical, functional, and psychosocial needs were assessed. Need for Home health care, PT, OT, , psychiatric care, and hospice was assessed. All preventative care measures were reviewed and updated. All medications were reviewed and reconciled. Potential drug interactions and medication adherence was reviewed. Prescriptions were renewed as appropriate. Education was provided to the patient and/or caregiver as needed, and all questions were answered. Over 30 minutes were spent providing these non-face-to-face services during this calendar month.    These services were provided directly by myself, the physician.    Called in Cipro and Flagyl several days ago for diverticulitis.  She is now having hives.  Discontinue Cipro and Flagyl and start Augmentin.  Take Xyzal and Benadryl for hives.

## 2024-01-31 ENCOUNTER — HOSPITAL ENCOUNTER (OUTPATIENT)
Dept: RADIOLOGY | Facility: OTHER | Age: 74
Discharge: HOME | End: 2024-01-31
Attending: INTERNAL MEDICINE
Payer: MEDICARE

## 2024-01-31 PROCEDURE — 77067 SCR MAMMO BI INCL CAD: CPT | Mod: 26,HCNC,, | Performed by: RADIOLOGY

## 2024-01-31 PROCEDURE — 77080 DXA BONE DENSITY AXIAL: CPT | Mod: TC,HCNC

## 2024-01-31 PROCEDURE — 77067 SCR MAMMO BI INCL CAD: CPT | Mod: TC,HCNC

## 2024-01-31 PROCEDURE — 77080 DXA BONE DENSITY AXIAL: CPT | Mod: 26,HCNC,ICN, | Performed by: RADIOLOGY

## 2024-01-31 PROCEDURE — 77063 BREAST TOMOSYNTHESIS BI: CPT | Mod: 26,HCNC,, | Performed by: RADIOLOGY

## 2024-02-02 PROBLEM — M85.89 OSTEOPENIA OF MULTIPLE SITES: Status: ACTIVE | Noted: 2024-02-02

## 2024-02-05 RX ORDER — FLUCONAZOLE 150 MG/1
150 TABLET ORAL DAILY PRN
Qty: 3 TABLET | Refills: 0 | Status: SHIPPED | OUTPATIENT
Start: 2024-02-05 | End: 2024-02-08

## 2024-02-15 ENCOUNTER — OFFICE VISIT (OUTPATIENT)
Dept: INTERNAL MEDICINE | Facility: CLINIC | Age: 74
End: 2024-02-15
Attending: INTERNAL MEDICINE
Payer: MEDICARE

## 2024-02-15 ENCOUNTER — HOSPITAL ENCOUNTER (OUTPATIENT)
Dept: RADIOLOGY | Facility: OTHER | Age: 74
Discharge: HOME | End: 2024-02-15
Attending: INTERNAL MEDICINE
Payer: MEDICARE

## 2024-02-15 VITALS
HEART RATE: 96 BPM | BODY MASS INDEX: 30.21 KG/M2 | OXYGEN SATURATION: 97 % | DIASTOLIC BLOOD PRESSURE: 68 MMHG | HEIGHT: 61 IN | WEIGHT: 160 LBS | SYSTOLIC BLOOD PRESSURE: 124 MMHG

## 2024-02-15 DIAGNOSIS — R10.84 GENERALIZED ABDOMINAL PAIN: ICD-10-CM

## 2024-02-15 DIAGNOSIS — R10.84 GENERALIZED ABDOMINAL PAIN: Primary | ICD-10-CM

## 2024-02-15 DIAGNOSIS — Z87.19 HISTORY OF COLONIC DIVERTICULITIS: ICD-10-CM

## 2024-02-15 PROCEDURE — 3044F HG A1C LEVEL LT 7.0%: CPT | Mod: CPTII,S$GLB,, | Performed by: INTERNAL MEDICINE

## 2024-02-15 PROCEDURE — 1159F MED LIST DOCD IN RCRD: CPT | Mod: CPTII,S$GLB,, | Performed by: INTERNAL MEDICINE

## 2024-02-15 PROCEDURE — 74019 RADEX ABDOMEN 2 VIEWS: CPT | Mod: TC,HCNC,FY

## 2024-02-15 PROCEDURE — 1160F RVW MEDS BY RX/DR IN RCRD: CPT | Mod: CPTII,S$GLB,, | Performed by: INTERNAL MEDICINE

## 2024-02-15 PROCEDURE — 3078F DIAST BP <80 MM HG: CPT | Mod: CPTII,S$GLB,, | Performed by: INTERNAL MEDICINE

## 2024-02-15 PROCEDURE — 74019 RADEX ABDOMEN 2 VIEWS: CPT | Mod: 26,HCNC,, | Performed by: RADIOLOGY

## 2024-02-15 PROCEDURE — 3074F SYST BP LT 130 MM HG: CPT | Mod: CPTII,S$GLB,, | Performed by: INTERNAL MEDICINE

## 2024-02-15 PROCEDURE — 3008F BODY MASS INDEX DOCD: CPT | Mod: CPTII,S$GLB,, | Performed by: INTERNAL MEDICINE

## 2024-02-15 PROCEDURE — 99214 OFFICE O/P EST MOD 30 MIN: CPT | Mod: S$GLB,,, | Performed by: INTERNAL MEDICINE

## 2024-02-15 RX ORDER — DICYCLOMINE HYDROCHLORIDE 20 MG/1
20 TABLET ORAL 3 TIMES DAILY PRN
Qty: 30 TABLET | Refills: 0 | Status: SHIPPED | OUTPATIENT
Start: 2024-02-15 | End: 2024-03-16

## 2024-02-15 RX ORDER — PANTOPRAZOLE SODIUM 40 MG/1
40 TABLET, DELAYED RELEASE ORAL DAILY
Qty: 30 TABLET | Refills: 1 | Status: SHIPPED | OUTPATIENT
Start: 2024-02-15 | End: 2025-02-14

## 2024-02-15 NOTE — PROGRESS NOTES
Subjective     Patient ID: Chely Kramer is a 73 y.o. female.    Chief Complaint: Follow-up (Last night  could not urinate or have a bowel movement, today she had 2 large cups of coffee and had 2 bowel movements and urine dribbled out a little and still cannot urinate, has taken 2 flagyl, is in an great amount of pain, stool is mucus/bubbly, no back pain )    She saw me on January 18, 2024 and felt like she was having a diverticulitis episode which was resolving.  She declined antibiotics at that time.  On January 25th she decided since she was still not well to take the antibiotics.  She took Cipro and Flagyl for 4 days but developed a rash.  She was changed to Augmentin which she took for 7 days.  She did get better, but has not felt completely well since then.  She has been having intermittent abdominal cramping.  She is been having loose bowel movements.  She feels like her urine volume has gone down considerably.    She has had multiple abdominal surgeries in the past.  She had a small bowel obstruction many years ago which resolved with NPO and NG suction.  She denies any nausea or vomiting.  She is had fever.  She has had episodes where she feels cold.    She has been taking meloxicam more frequently lately, which she believes improves the abdominal pain.    Follow-up  Associated symptoms include abdominal pain.     Review of Systems   Gastrointestinal:  Positive for abdominal distention and abdominal pain.   Genitourinary:  Positive for difficulty urinating.          Objective     Physical Exam  Vitals and nursing note reviewed.   Constitutional:       Appearance: She is well-developed.   HENT:      Head: Normocephalic.   Eyes:      Pupils: Pupils are equal, round, and reactive to light.   Cardiovascular:      Rate and Rhythm: Normal rate and regular rhythm.      Heart sounds: Normal heart sounds.   Pulmonary:      Effort: Pulmonary effort is normal.   Abdominal:      General: Abdomen is flat. Bowel sounds  are decreased. There is distension.      Palpations: Abdomen is soft.      Tenderness: There is no abdominal tenderness. There is no guarding or rebound.   Neurological:      Mental Status: She is alert.            Assessment and Plan     1. Generalized abdominal pain  -     X-Ray Abdomen Flat And Erect; Future; Expected date: 02/15/2024    2. History of colonic diverticulitis    Other orders  -     dicyclomine (BENTYL) 20 mg tablet; Take 1 tablet (20 mg total) by mouth 3 (three) times daily as needed (abdominal cramping).  Dispense: 30 tablet; Refill: 0  -     pantoprazole (PROTONIX) 40 MG tablet; Take 1 tablet (40 mg total) by mouth once daily.  Dispense: 30 tablet; Refill: 1        Plan:  Per orders and D/C instructions.  Intermittent crampy abdominal pain of unclear etiology.  She may have a partial small bowel obstruction.  Abdominal x-ray today.  Clear liquids, pantoprazole, and Bentyl as needed for now.    Between 30 and 39 min of total time for evaluation and management services were spent on the patient today.  The medical problems and treatment options were discussed, and all questions were answered.         Follow up in about 6 months (around 8/15/2024).

## 2024-02-21 ENCOUNTER — HOSPITAL ENCOUNTER (INPATIENT)
Facility: OTHER | Age: 74
LOS: 4 days | Discharge: HOME | DRG: 372 | End: 2024-02-25
Attending: EMERGENCY MEDICINE | Admitting: HOSPITALIST
Payer: MEDICARE

## 2024-02-21 DIAGNOSIS — R39.198 DIFFICULTY URINATING: ICD-10-CM

## 2024-02-21 DIAGNOSIS — R30.0 DYSURIA: ICD-10-CM

## 2024-02-21 DIAGNOSIS — N28.89 RENAL MASS: ICD-10-CM

## 2024-02-21 DIAGNOSIS — R07.9 CHEST PAIN: ICD-10-CM

## 2024-02-21 DIAGNOSIS — K57.32 SIGMOID DIVERTICULITIS: ICD-10-CM

## 2024-02-21 DIAGNOSIS — K57.80 DIVERTICULITIS OF INTESTINE WITH ABSCESS: Primary | ICD-10-CM

## 2024-02-21 DIAGNOSIS — L02.91 ABSCESS: ICD-10-CM

## 2024-02-21 DIAGNOSIS — K57.20 DIVERTICULITIS OF LARGE INTESTINE WITH ABSCESS WITHOUT BLEEDING: ICD-10-CM

## 2024-02-21 PROBLEM — R35.0 FREQUENCY OF URINATION: Status: ACTIVE | Noted: 2024-02-21

## 2024-02-21 LAB
ALBUMIN SERPL BCP-MCNC: 3.1 G/DL (ref 3.5–5.2)
ALP SERPL-CCNC: 70 U/L (ref 55–135)
ALT SERPL W/O P-5'-P-CCNC: 12 U/L (ref 10–44)
ANION GAP SERPL CALC-SCNC: 9 MMOL/L (ref 8–16)
AST SERPL-CCNC: 14 U/L (ref 10–40)
BASOPHILS # BLD AUTO: 0.03 K/UL (ref 0–0.2)
BASOPHILS NFR BLD: 0.3 % (ref 0–1.9)
BILIRUB SERPL-MCNC: 0.2 MG/DL (ref 0.1–1)
BILIRUB UR QL STRIP: NEGATIVE
BUN SERPL-MCNC: 10 MG/DL (ref 8–23)
CALCIUM SERPL-MCNC: 9.2 MG/DL (ref 8.7–10.5)
CHLORIDE SERPL-SCNC: 99 MMOL/L (ref 95–110)
CLARITY UR: CLEAR
CO2 SERPL-SCNC: 28 MMOL/L (ref 23–29)
COLOR UR: YELLOW
CREAT SERPL-MCNC: 0.7 MG/DL (ref 0.5–1.4)
CREAT SERPL-MCNC: 0.7 MG/DL (ref 0.5–1.4)
DIFFERENTIAL METHOD BLD: ABNORMAL
EOSINOPHIL # BLD AUTO: 0 K/UL (ref 0–0.5)
EOSINOPHIL NFR BLD: 0.3 % (ref 0–8)
ERYTHROCYTE [DISTWIDTH] IN BLOOD BY AUTOMATED COUNT: 11.9 % (ref 11.5–14.5)
EST. GFR  (NO RACE VARIABLE): >60 ML/MIN/1.73 M^2
GLUCOSE SERPL-MCNC: 107 MG/DL (ref 70–110)
GLUCOSE UR QL STRIP: NEGATIVE
HCT VFR BLD AUTO: 36.8 % (ref 37–48.5)
HGB BLD-MCNC: 12.2 G/DL (ref 12–16)
HGB UR QL STRIP: NEGATIVE
IMM GRANULOCYTES # BLD AUTO: 0.03 K/UL (ref 0–0.04)
IMM GRANULOCYTES NFR BLD AUTO: 0.3 % (ref 0–0.5)
KETONES UR QL STRIP: NEGATIVE
LEUKOCYTE ESTERASE UR QL STRIP: NEGATIVE
LYMPHOCYTES # BLD AUTO: 1.5 K/UL (ref 1–4.8)
LYMPHOCYTES NFR BLD: 15.5 % (ref 18–48)
MCH RBC QN AUTO: 31 PG (ref 27–31)
MCHC RBC AUTO-ENTMCNC: 33.2 G/DL (ref 32–36)
MCV RBC AUTO: 94 FL (ref 82–98)
MONOCYTES # BLD AUTO: 0.9 K/UL (ref 0.3–1)
MONOCYTES NFR BLD: 9 % (ref 4–15)
NEUTROPHILS # BLD AUTO: 7.1 K/UL (ref 1.8–7.7)
NEUTROPHILS NFR BLD: 74.6 % (ref 38–73)
NITRITE UR QL STRIP: NEGATIVE
NRBC BLD-RTO: 0 /100 WBC
PH UR STRIP: 7 [PH] (ref 5–8)
PLATELET # BLD AUTO: 338 K/UL (ref 150–450)
PMV BLD AUTO: 8.3 FL (ref 9.2–12.9)
POTASSIUM SERPL-SCNC: 3.8 MMOL/L (ref 3.5–5.1)
PROT SERPL-MCNC: 6.7 G/DL (ref 6–8.4)
PROT UR QL STRIP: NEGATIVE
RBC # BLD AUTO: 3.93 M/UL (ref 4–5.4)
SAMPLE: NORMAL
SODIUM SERPL-SCNC: 136 MMOL/L (ref 136–145)
SP GR UR STRIP: 1.01 (ref 1–1.03)
URN SPEC COLLECT METH UR: NORMAL
UROBILINOGEN UR STRIP-ACNC: NEGATIVE EU/DL
WBC # BLD AUTO: 9.51 K/UL (ref 3.9–12.7)

## 2024-02-21 PROCEDURE — 25000003 PHARM REV CODE 250: Mod: HCNC | Performed by: NURSE PRACTITIONER

## 2024-02-21 PROCEDURE — 80053 COMPREHEN METABOLIC PANEL: CPT | Mod: HCNC | Performed by: NURSE PRACTITIONER

## 2024-02-21 PROCEDURE — 81003 URINALYSIS AUTO W/O SCOPE: CPT | Mod: HCNC | Performed by: PHYSICIAN ASSISTANT

## 2024-02-21 PROCEDURE — 96365 THER/PROPH/DIAG IV INF INIT: CPT | Mod: HCNC

## 2024-02-21 PROCEDURE — 25000003 PHARM REV CODE 250: Mod: HCNC | Performed by: PHYSICIAN ASSISTANT

## 2024-02-21 PROCEDURE — 0W9J30Z DRAINAGE OF PELVIC CAVITY WITH DRAINAGE DEVICE, PERCUTANEOUS APPROACH: ICD-10-PCS | Performed by: RADIOLOGY

## 2024-02-21 PROCEDURE — 63600175 PHARM REV CODE 636 W HCPCS: Mod: HCNC | Performed by: PHYSICIAN ASSISTANT

## 2024-02-21 PROCEDURE — 99284 EMERGENCY DEPT VISIT MOD MDM: CPT | Mod: HCNC,,, | Performed by: UROLOGY

## 2024-02-21 PROCEDURE — 63600175 PHARM REV CODE 636 W HCPCS: Mod: HCNC | Performed by: NURSE PRACTITIONER

## 2024-02-21 PROCEDURE — 25500020 PHARM REV CODE 255: Mod: HCNC | Performed by: EMERGENCY MEDICINE

## 2024-02-21 PROCEDURE — 99223 1ST HOSP IP/OBS HIGH 75: CPT | Mod: HCNC,,, | Performed by: RADIOLOGY

## 2024-02-21 PROCEDURE — 11000001 HC ACUTE MED/SURG PRIVATE ROOM: Mod: HCNC

## 2024-02-21 PROCEDURE — 85025 COMPLETE CBC W/AUTO DIFF WBC: CPT | Mod: HCNC | Performed by: NURSE PRACTITIONER

## 2024-02-21 PROCEDURE — 99285 EMERGENCY DEPT VISIT HI MDM: CPT | Mod: 25,HCNC

## 2024-02-21 RX ORDER — SODIUM CHLORIDE 0.9 % (FLUSH) 0.9 %
5 SYRINGE (ML) INJECTION
Status: DISCONTINUED | OUTPATIENT
Start: 2024-02-21 | End: 2024-02-25 | Stop reason: HOSPADM

## 2024-02-21 RX ORDER — ACETAMINOPHEN 500 MG
1000 TABLET ORAL EVERY 8 HOURS PRN
Status: DISCONTINUED | OUTPATIENT
Start: 2024-02-21 | End: 2024-02-25 | Stop reason: HOSPADM

## 2024-02-21 RX ORDER — BRIMONIDINE TARTRATE 1.5 MG/ML
1 SOLUTION/ DROPS OPHTHALMIC 2 TIMES DAILY
Status: DISCONTINUED | OUTPATIENT
Start: 2024-02-21 | End: 2024-02-25 | Stop reason: HOSPADM

## 2024-02-21 RX ORDER — IPRATROPIUM BROMIDE AND ALBUTEROL SULFATE 2.5; .5 MG/3ML; MG/3ML
3 SOLUTION RESPIRATORY (INHALATION) EVERY 4 HOURS PRN
Status: DISCONTINUED | OUTPATIENT
Start: 2024-02-21 | End: 2024-02-25 | Stop reason: HOSPADM

## 2024-02-21 RX ORDER — ONDANSETRON 8 MG/1
8 TABLET, ORALLY DISINTEGRATING ORAL EVERY 8 HOURS PRN
Status: DISCONTINUED | OUTPATIENT
Start: 2024-02-21 | End: 2024-02-25 | Stop reason: HOSPADM

## 2024-02-21 RX ORDER — IBUPROFEN 200 MG
16 TABLET ORAL
Status: DISCONTINUED | OUTPATIENT
Start: 2024-02-21 | End: 2024-02-25 | Stop reason: HOSPADM

## 2024-02-21 RX ORDER — TRAZODONE HYDROCHLORIDE 50 MG/1
50 TABLET ORAL NIGHTLY PRN
Status: DISCONTINUED | OUTPATIENT
Start: 2024-02-21 | End: 2024-02-25 | Stop reason: HOSPADM

## 2024-02-21 RX ORDER — HYDROCODONE BITARTRATE AND ACETAMINOPHEN 10; 325 MG/1; MG/1
1 TABLET ORAL EVERY 6 HOURS PRN
Status: DISCONTINUED | OUTPATIENT
Start: 2024-02-21 | End: 2024-02-25 | Stop reason: HOSPADM

## 2024-02-21 RX ORDER — OXYBUTYNIN CHLORIDE 5 MG/1
5 TABLET ORAL 3 TIMES DAILY PRN
Status: DISCONTINUED | OUTPATIENT
Start: 2024-02-21 | End: 2024-02-25 | Stop reason: HOSPADM

## 2024-02-21 RX ORDER — LATANOPROST 50 UG/ML
1 SOLUTION/ DROPS OPHTHALMIC NIGHTLY
Status: DISCONTINUED | OUTPATIENT
Start: 2024-02-21 | End: 2024-02-25 | Stop reason: HOSPADM

## 2024-02-21 RX ORDER — ACETAMINOPHEN 325 MG/1
650 TABLET ORAL EVERY 4 HOURS PRN
Status: DISCONTINUED | OUTPATIENT
Start: 2024-02-21 | End: 2024-02-25 | Stop reason: HOSPADM

## 2024-02-21 RX ORDER — GLUCAGON 1 MG
1 KIT INJECTION
Status: DISCONTINUED | OUTPATIENT
Start: 2024-02-21 | End: 2024-02-25 | Stop reason: HOSPADM

## 2024-02-21 RX ORDER — TALC
6 POWDER (GRAM) TOPICAL NIGHTLY PRN
Status: DISCONTINUED | OUTPATIENT
Start: 2024-02-21 | End: 2024-02-25 | Stop reason: HOSPADM

## 2024-02-21 RX ORDER — IBUPROFEN 200 MG
24 TABLET ORAL
Status: DISCONTINUED | OUTPATIENT
Start: 2024-02-21 | End: 2024-02-25 | Stop reason: HOSPADM

## 2024-02-21 RX ORDER — DICYCLOMINE HYDROCHLORIDE 10 MG/1
20 CAPSULE ORAL 3 TIMES DAILY PRN
Status: DISCONTINUED | OUTPATIENT
Start: 2024-02-21 | End: 2024-02-25 | Stop reason: HOSPADM

## 2024-02-21 RX ORDER — PROCHLORPERAZINE EDISYLATE 5 MG/ML
5 INJECTION INTRAMUSCULAR; INTRAVENOUS EVERY 6 HOURS PRN
Status: DISCONTINUED | OUTPATIENT
Start: 2024-02-21 | End: 2024-02-25 | Stop reason: HOSPADM

## 2024-02-21 RX ORDER — ALUMINUM HYDROXIDE, MAGNESIUM HYDROXIDE, AND SIMETHICONE 1200; 120; 1200 MG/30ML; MG/30ML; MG/30ML
30 SUSPENSION ORAL 4 TIMES DAILY PRN
Status: DISCONTINUED | OUTPATIENT
Start: 2024-02-21 | End: 2024-02-25 | Stop reason: HOSPADM

## 2024-02-21 RX ORDER — SIMETHICONE 80 MG
1 TABLET,CHEWABLE ORAL 4 TIMES DAILY PRN
Status: DISCONTINUED | OUTPATIENT
Start: 2024-02-21 | End: 2024-02-25 | Stop reason: HOSPADM

## 2024-02-21 RX ORDER — PANTOPRAZOLE SODIUM 40 MG/1
40 TABLET, DELAYED RELEASE ORAL DAILY
Status: DISCONTINUED | OUTPATIENT
Start: 2024-02-22 | End: 2024-02-25 | Stop reason: HOSPADM

## 2024-02-21 RX ORDER — NALOXONE HCL 0.4 MG/ML
0.02 VIAL (ML) INJECTION
Status: DISCONTINUED | OUTPATIENT
Start: 2024-02-21 | End: 2024-02-25 | Stop reason: HOSPADM

## 2024-02-21 RX ADMIN — BRIMONIDINE TARTRATE 1 DROP: 1.5 SOLUTION/ DROPS OPHTHALMIC at 09:02

## 2024-02-21 RX ADMIN — IOHEXOL 75 ML: 350 INJECTION, SOLUTION INTRAVENOUS at 11:02

## 2024-02-21 RX ADMIN — PIPERACILLIN SODIUM AND TAZOBACTAM SODIUM 4.5 G: 4; .5 INJECTION, POWDER, LYOPHILIZED, FOR SOLUTION INTRAVENOUS at 09:02

## 2024-02-21 RX ADMIN — DICYCLOMINE HYDROCHLORIDE 20 MG: 10 CAPSULE ORAL at 04:02

## 2024-02-21 RX ADMIN — ACETAMINOPHEN 1000 MG: 500 TABLET ORAL at 04:02

## 2024-02-21 RX ADMIN — PIPERACILLIN AND TAZOBACTAM 4.5 G: 4; .5 INJECTION, POWDER, LYOPHILIZED, FOR SOLUTION INTRAVENOUS; PARENTERAL at 02:02

## 2024-02-21 RX ADMIN — LATANOPROST 1 DROP: 50 SOLUTION/ DROPS OPHTHALMIC at 09:02

## 2024-02-21 RX ADMIN — SIMETHICONE 80 MG: 80 TABLET, CHEWABLE ORAL at 04:02

## 2024-02-21 NOTE — ED TRIAGE NOTES
Patient reports dysuria over past week with decreased urine output. Denies N/V, fever. States she constantly feels bladder pressure but is unable to urinate. Recently completed antibiotics for diverticulitis. LBM yesterday. Presents awake, alert.

## 2024-02-21 NOTE — CONSULTS
Consult/H&P Note  Interventional Radiology    Consult Requested By: ED    Reason for Consult: diverticular abscess    SUBJECTIVE:     Chief Complaint: urinary frequency    History of Present Illness: 74 yo F with diverticulitis and abscess. She has taken 2 courses of antibiotics during the past few weeks and presents with chief complaint of urinary frequency/urgency.    Past Medical History:   Diagnosis Date    Glaucoma      Past Surgical History:   Procedure Laterality Date    ADENOIDECTOMY      APPENDECTOMY      Ruptured    BREAST BIOPSY      BREAST CYST EXCISION      HERNIA REPAIR Right     Ventral    INCISION AND DRAINAGE OF HAND Left 2019    Procedure: INCISION AND DRAINAGE, HAND (ADD ON );  Surgeon: Sameer Evans MD;  Location: [facility];  Service: Orthopedics;  Laterality: Left;  AXILLARY BLOCK  (ADD ON )    TONSILLECTOMY       Family History   Problem Relation Age of Onset    Heart disease Father 74        CABG    Parkinsonism Brother     Mental illness Brother         schizophrenia     Social History     Tobacco Use    Smoking status: Former     Current packs/day: 0.00     Types: Cigarettes     Quit date: 5/3/1992     Years since quittin.8    Smokeless tobacco: Never   Substance Use Topics    Alcohol use: Yes     Alcohol/week: 4.0 standard drinks of alcohol     Types: 4 Shots of liquor per week    Drug use: No       Review of Systems:  Constitutional/General:Negative for fever.  Hematological/Immuno: no known coagulopathies  Respiratory: no shortness of breath  Cardiovascular: no chest pain  Gastrointestinal: positive for - abdominal pain and change in bowel habits  Genito-Urinary: positive for - dysuria and urinary frequency/urgency  Musculoskeletal: negative  Skin: Negative for rash, itching, pigmentation changes, nail or hair changes.  Neurological: no TIA or stroke symptoms  Psychiatric: normal mood/affect, good insight/judgement      OBJECTIVE:     Vital Signs Range (Last  "24H):  Temp:  [97.8 °F (36.6 °C)]   Pulse:  [112]   Resp:  [20]   BP: (136)/(67)   SpO2:  [98 %]     Physical Exam:  General- Patient alert and oriented x3 in NAD  ENT- PERRLA,  Neck- No masses  CV- sinus tachy  Resp-  No increased WOB  GI- Non tender/non-distended  Extrem- No cyanosis, clubbing, edema.   Derm- No rashes, masses, or lesions noted  Neuro-  No focal deficits noted.     Physical Exam  Body mass index is 28.91 kg/m².    Scheduled Meds:    brimonidine 0.15 % OPTH DROP  1 drop Both Eyes BID    latanoprost  1 drop Both Eyes QHS    [START ON 2/22/2024] pantoprazole  40 mg Oral Daily    piperacillin-tazobactam (Zosyn) IV (PEDS and ADULTS) (extended infusion is not appropriate)  4.5 g Intravenous Q8H     Continuous Infusions:   PRN Meds:dicyclomine, oxybutynin, traZODone    Allergies:   Review of patient's allergies indicates:   Allergen Reactions    Ciprofloxacin (bulk) Hives    Flagyl [metronidazole] Hives       Labs:  No results for input(s): "INR", "PT", "PTT" in the last 168 hours.    Recent Labs   Lab 02/21/24  1122   WBC 9.51   HGB 12.2   HCT 36.8*   MCV 94         Recent Labs   Lab 02/21/24  1122         K 3.8   CL 99   CO2 28   BUN 10   CREATININE 0.7   CALCIUM 9.2   ALT 12   AST 14   ALBUMIN 3.1*   BILITOT 0.2       Vitals (Most Recent):  Temp: 97.8 °F (36.6 °C) (02/21/24 0945)  Pulse: (!) 112 (02/21/24 0945)  Resp: 20 (02/21/24 0945)  BP: 136/67 (02/21/24 0945)  SpO2: 98 % (02/21/24 0945)    ASA: 2  Mallampati: 2    Consent obtained    ASSESSMENT/PLAN:     Diverticular abscess aspiration +/- drain placement under CT guidance. Moderate sedation.    Active Hospital Problems    Diagnosis  POA    *Diverticulitis of large intestine with abscess without bleeding [K57.20]  Yes    Renal mass [N28.89]  Yes    Primary open angle glaucoma (POAG) of left eye, indeterminate stage [H40.1124]  Yes     Dr. [place]      History of colonic diverticulitis [Z87.19]  Not Applicable      Resolved " Hospital Problems   No resolved problems to display.           Michael Brown MD

## 2024-02-21 NOTE — ASSESSMENT & PLAN NOTE
Chely Kramer was diagnosed with diverticulitis at the end of January, prescribed Cipro/Flagyl but developed a rash and was switched to Augmentin; CT AP reveals acute on chronic diverticulitis with deep pelvic abscess    1/4 SIRS for tachycardia on admission  Started on IV Zosyn in ER, continue  Surgery consulted, recommended IR consultation for drainage of abscess  IR consulted, plan for abscess drainage 2/22  NPO @ [facility]  Pain control

## 2024-02-21 NOTE — ED PROVIDER NOTES
Source of History:  Patient    Chief complaint:  Urinary Frequency (Pt. Complains of unable to urinate x 1 week. Pt. Was prescribed augmentin for 7 days.)      HPI:  Chely Kramer is a 73 y.o. female past medical history of anemia and hypothyroidism presenting with difficulty urinating for the past week.  Patient reports dysuria and states that every time she tries to go the bathroom it feels like she can not go and very little comes out.  Patient was recently treated for presumed diverticulitis due to lower abdominal pain by her PCP.  She initially took Cipro and Flagyl but had a reaction and was switched to Augmentin which she completed a 7 day course. No fever, chills, constipation, diarrhea, nausea or vomiting.    This is the extent to the patients complaints today here in the emergency department.    ROS: As per HPI     Review of patient's allergies indicates:   Allergen Reactions    Ciprofloxacin (bulk) Hives    Flagyl [metronidazole] Hives       PMH:  As per HPI and below:  Past Medical History:   Diagnosis Date    Glaucoma      Past Surgical History:   Procedure Laterality Date    ADENOIDECTOMY      APPENDECTOMY      Ruptured    BREAST BIOPSY      BREAST CYST EXCISION      HERNIA REPAIR Right     Ventral    INCISION AND DRAINAGE OF HAND Left 2019    Procedure: INCISION AND DRAINAGE, HAND (ADD ON );  Surgeon: Sameer Evans MD;  Location: [facility] OR;  Service: Orthopedics;  Laterality: Left;  AXILLARY BLOCK  (ADD ON )    TONSILLECTOMY         Social History     Tobacco Use    Smoking status: Former     Current packs/day: 0.00     Types: Cigarettes     Quit date: 5/3/1992     Years since quittin.8    Smokeless tobacco: Never   Substance Use Topics    Alcohol use: Yes     Alcohol/week: 4.0 standard drinks of alcohol     Types: 4 Shots of liquor per week    Drug use: No       Physical Exam:    /67 (BP Location: Left arm, Patient Position: Sitting)   Pulse (!) 112   Temp 97.8 °F  "(36.6 °C) (Oral)   Resp 20   Ht 5' 1" (1.549 m)   Wt 69.4 kg (153 lb)   SpO2 98%   BMI 28.91 kg/m²   Nursing note and vital signs reviewed.  Appearance: No acute distress.  Eyes: No conjunctival injection.  ENT: Oropharynx clear.    Chest/ Respiratory: Clear to auscultation bilaterally.  Good air movement.  No wheezes.  No rhonchi. No rales. No accessory muscle use.  Cardiovascular: Regular rate and rhythm.  No murmurs. No gallops. No rubs.  Abdomen: Soft.  Not distended.  Suprapubic tenderness and LLQ tenderness.  No guarding.  No rebound. Non-peritoneal.  Musculoskeletal: Good range of motion all joints.  No deformities.  Neck supple.  No meningismus.  Skin: No rashes seen.  Good turgor.  No abrasions.  No ecchymoses.  Neurologic: Motor intact.  Sensation intact.  Cerebellar intact.  Cranial nerves intact.  Mental Status:  Alert and oriented x 3.  Appropriate, conversant    Labs that have been ordered have been independently reviewed and interpreted by myself.        Labs Reviewed   CBC W/ AUTO DIFFERENTIAL - Abnormal; Notable for the following components:       Result Value    RBC 3.93 (*)     Hematocrit 36.8 (*)     MPV 8.3 (*)     Gran % 74.6 (*)     Lymph % 15.5 (*)     All other components within normal limits   COMPREHENSIVE METABOLIC PANEL - Abnormal; Notable for the following components:    Albumin 3.1 (*)     All other components within normal limits   URINALYSIS, REFLEX TO URINE CULTURE    Narrative:     Specimen Source->Urine   ISTAT CREATININE       Imaging Results               CT Abdomen Pelvis With IV Contrast NO Oral Contrast (Final result)  Result time 02/21/24 13:05:16      Final result by Christopher Angeles MD (02/21/24 13:05:16)                   Impression:      Extensive colonic diverticulosis with findings worrisome for acute on chronic sigmoid diverticulitis with focal abscess in the deep pelvis.    4.3 cm solid left renal mass highly worrisome for renal cell neoplasm.  Recommend close " follow-up with urology.    This report was flagged in Epic as abnormal.      Electronically signed by: Christopher Angeles MD  Date:    02/21/2024  Time:    13:05               Narrative:    EXAMINATION:  CT ABDOMEN PELVIS WITH IV CONTRAST    CLINICAL HISTORY:  LLQ abdominal pain;    TECHNIQUE:  Low dose axial images, sagittal and coronal reformations were obtained from the lung bases to the pubic symphysis following the IV administration of 75 mL of Omnipaque 350 .  Oral contrast was not given.    COMPARISON:  02/15/2024    FINDINGS:  Heart is mildly enlarged.  Mild basilar atelectatic changes versus scarring.    Small hiatal hernia.  Otherwise, stomach shows no significant abnormalities.    Liver is homogeneous in attenuation with no focal liver lesions.  Spleen, pancreas and right adrenal gland shows no significant abnormalities.  Mild left adrenal gland thickening without discrete nodule.    There is a 2.4 cm right renal hypodensity likely a cyst.  Minimal dilatation of the right collecting system.  There is a solid-appearing mass involving the left kidney.  This extends into the renal pelvis but also has an exophytic component.  This measures 4.1 x 3.3 x 4.3 cm.  No left-sided hydronephrosis.  Left renal vein appears patent.    Urinary bladder and uterus are displaced anteriorly by the pelvic abscess.    There is extensive colonic diverticulosis with wall thickening involving the sigmoid colon likely relating to chronic diverticulitis.  There are inflammatory changes in the pelvis with a 5.1 x 3.4 x 5.0 cm rim enhancing collection in the pelvis worrisome for abscess.  Appendix shows no significant abnormalities.  No free air.  There are postoperative changes of right lower abdominal hernia repair.    Abdominal aorta is normal in caliber with moderate atherosclerosis.  Several normal sized periaortic lymph nodes.  No abnormal lymph node enlargement.    Osseous structures show degenerative changes.  No acute osseous  "abnormalities.                                      Initial Impression/ Differential Dx:  Urgent evaluation of 73 y.o. female presenting with dysuria and difficulty urinating. Patient is afebrile, not toxic appearing and hemodynamically stable.  UA has resulted at time of initial assessment and has no evidence of infection.  Bladder scan shows 51 cc of urine.  On exam patient was suprapubic and left lower quadrant tenderness.  Given recent diverticulitis, concern for treatment failure and reoccurrence of diverticulitis.  Given symptoms, bladder spasms could be a consideration.  Will discuss with urology to see if oxybutynin maybe appropriate.  Will also obtain a CT of the abdomen and pelvis and labs.  Patient has had abdominal surgery, small bowel obstruction also a consideration.    Differential Diagnosis includes, but is not limited to:  SBO, incarcerated/strangulated hernia, ileus, appendicitis, diverticulitis,  gastroenteritis, colitis, IBD/IBS, constipation, UTI/pyelonephritis, bladder spasm       MDM:    Labwork with mild anemia and shift noted, however otherwise grossly unremarkable.    CT shows "Extensive colonic diverticulosis with findings worrisome for acute on chronic sigmoid diverticulitis with focal abscess in the deep pelvis. 4.3 cm solid left renal mass highly worrisome for renal cell neoplasm."    Discussed with Dr. Pineda from general surgery.  He states admit to the hospital medicine service.  Also consult Interventional Radiology and see if they can drain the abscess.  Will also consult Urology.    Discussed with IR. They will drain abscess tomorrow, NPO at midnight, abx.    Discussed with Dr. Suarez who will admit to Dr. Castro's service.    Discussed with Dr. Borja from Urology who states that trialing oxybutynin to see if it improves her symptoms at low doses appropriate but likely the displacement of the bladder from the abscess is what is causing symptoms.  Will consult due to renal mass as " well.      Critical Care    Date/Time: 2/21/2024 2:23 PM    Performed by: Deric Oh MD  Authorized by: Deric Oh MD  Direct patient critical care time: 12 minutes  Additional history critical care time: 6 minutes  Ordering / reviewing critical care time: 6 minutes  Documentation critical care time: 6 minutes  Consulting other physicians critical care time: 6 minutes  Total critical care time (exclusive of procedural time) : 36 minutes  Critical care time was exclusive of separately billable procedures and treating other patients and teaching time.  Critical care was necessary to treat or prevent imminent or life-threatening deterioration of the following conditions: sepsis, shock, metabolic crisis and renal failure.  Critical care was time spent personally by me on the following activities: development of treatment plan with patient or surrogate, discussions with consultants, interpretation of cardiac output measurements, evaluation of patient's response to treatment, examination of patient, obtaining history from patient or surrogate, ordering and performing treatments and interventions, ordering and review of laboratory studies, ordering and review of radiographic studies, pulse oximetry, re-evaluation of patient's condition and review of old charts.                     Diagnostic Impression:    1. Sigmoid diverticulitis    2. Diverticulitis of large intestine with abscess without bleeding    3. Dysuria    4. Renal mass    5. Abscess    6. Difficulty urinating    7. Diverticulitis of intestine with abscess    8. Chest pain         ED Disposition Condition    Admit                   Carlos Alberto Lennon, AURA  02/21/24 1515

## 2024-02-21 NOTE — H&P
"  [facility]  History & Physical    Patient Name: Chely Kramer  MRN: [id number]  Patient Class: IP- Inpatient  Admission Date: 2/21/2024  Attending Physician: Judith Castro MD   Primary Care Provider: KIA Herrera MD         Patient information was obtained from patient, past medical records, and ER records.     Subjective:     Principal Problem:Diverticulitis of large intestine with abscess without bleeding    Chief Complaint:   Chief Complaint   Patient presents with    Urinary Frequency     Pt. Complains of unable to urinate x 1 week. Pt. Was prescribed augmentin for 7 days.        HPI: Chely Kramer is a pleasant 73F with history of diverticulitis, glaucoma who presents for difficulty urinating for several days. Every time she goes to urinate she feels as though she is not emptying her bladder completely. She feels like she is "running to the bathroom every ten minutes". If she does not go to urinate when she has the urge then she experiences lower abdominal pain.  No pain or discomfort with urination, no blood in urine. Denies fever, nausea, vomiting, diarrhea. At the end of January she saw her PCP for abdominal pain and was treated with Cipro/Flagyl for diverticulitis, however she developed a rash and was switched to Augmentin. She completed a 7 day course, but says her symptoms persisted. Her last BM was this morning, she does note that her stool is different consistency than usual, describes it as "mandeep"    In ER: afebrile without leukocytosis, mildly tachycardic ; CMP unremarkable, UA unremarkable, CT AP shows findings worrisome for acute on chronic diverticulitis of sigmoid colon with focal abscess in deep pelvis. 4.3cm left renal mass worrisome for renal cell carcinoma. ER discussed case with surgery who recommend IR consult, plan for drainage tomorrow. Urology also consulted for renal mass. Started on IV Zosyn    Past Medical History:   Diagnosis Date    " Glaucoma        Past Surgical History:   Procedure Laterality Date    ADENOIDECTOMY      APPENDECTOMY  1998    Ruptured    BREAST BIOPSY      BREAST CYST EXCISION      HERNIA REPAIR Right     Ventral    INCISION AND DRAINAGE OF HAND Left 2019    Procedure: INCISION AND DRAINAGE, HAND (ADD ON );  Surgeon: Sameer Evans MD;  Location: [facility];  Service: Orthopedics;  Laterality: Left;  AXILLARY BLOCK  (ADD ON )    TONSILLECTOMY         Review of patient's allergies indicates:   Allergen Reactions    Ciprofloxacin (bulk) Hives    Flagyl [metronidazole] Hives       No current facility-administered medications on file prior to encounter.     Current Outpatient Medications on File Prior to Encounter   Medication Sig    ascorbic acid, vitamin C, (VITAMIN C) 500 MG tablet Take 2,500 mg by mouth once daily.    brimonidine 0.2% (ALPHAGAN) 0.2 % Drop Place 1 drop into both eyes 2 (two) times daily.    dicyclomine (BENTYL) 20 mg tablet Take 1 tablet (20 mg total) by mouth 3 (three) times daily as needed (abdominal cramping).    latanoprost 0.005 % ophthalmic solution Place 1 drop into both eyes every evening.    loteprednol (ALREX) 0.2 % DrpS 1 drop 4 (four) times daily.    pantoprazole (PROTONIX) 40 MG tablet Take 1 tablet (40 mg total) by mouth once daily.    traZODone (DESYREL) 50 MG tablet Take 1/2-1 tablet nightly as needed for insomnia.     Family History       Problem Relation (Age of Onset)    Heart disease Father (74)    Mental illness Brother    Parkinsonism Brother          Tobacco Use    Smoking status: Former     Current packs/day: 0.00     Types: Cigarettes     Quit date: 5/3/1992     Years since quittin.8    Smokeless tobacco: Never   Substance and Sexual Activity    Alcohol use: Yes     Alcohol/week: 4.0 standard drinks of alcohol     Types: 4 Shots of liquor per week    Drug use: No    Sexual activity: Never     Review of Systems   Constitutional:  Negative for chills, diaphoresis and fever.    Respiratory:  Negative for cough and shortness of breath.    Cardiovascular:  Negative for chest pain.   Gastrointestinal:  Positive for abdominal pain. Negative for blood in stool, diarrhea, nausea and vomiting.   Genitourinary:  Positive for decreased urine volume and difficulty urinating. Negative for dysuria.   Musculoskeletal:  Negative for arthralgias and back pain.   Psychiatric/Behavioral:  Negative for agitation and confusion.      Objective:     Vital Signs (Most Recent):  Temp: 97.8 °F (36.6 °C) (02/21/24 0945)  Pulse: (!) 112 (02/21/24 0945)  Resp: 20 (02/21/24 0945)  BP: 136/67 (02/21/24 0945)  SpO2: 98 % (02/21/24 0945) Vital Signs (24h Range):  Temp:  [97.8 °F (36.6 °C)] 97.8 °F (36.6 °C)  Pulse:  [112] 112  Resp:  [20] 20  SpO2:  [98 %] 98 %  BP: (136)/(67) 136/67     Weight: 69.4 kg (153 lb)  Body mass index is 28.91 kg/m².     Physical Exam  Constitutional:       General: She is not in acute distress.     Appearance: Normal appearance. She is not ill-appearing.   HENT:      Head: Normocephalic and atraumatic.   Cardiovascular:      Rate and Rhythm: Normal rate and regular rhythm.   Pulmonary:      Effort: Pulmonary effort is normal. No respiratory distress.   Abdominal:      General: Abdomen is flat. Bowel sounds are normal. There is no distension.      Palpations: Abdomen is soft.      Tenderness: There is no abdominal tenderness.   Neurological:      Mental Status: She is alert.                Significant Labs: All pertinent labs within the past 24 hours have been reviewed.    Significant Imaging: I have reviewed all pertinent imaging results/findings within the past 24 hours.  Assessment/Plan:     * Diverticulitis of large intestine with abscess without bleeding  Chely Kramer was diagnosed with diverticulitis at the end of January, prescribed Cipro/Flagyl but developed a rash and was switched to Augmentin; CT AP reveals acute on chronic diverticulitis with deep pelvic abscess    1/4 SIRS for  tachycardia on admission  Started on IV Zosyn in ER, continue  Surgery consulted, recommended IR consultation for drainage of abscess  IR consulted, plan for abscess drainage 2/22  NPO @ [facility]  Pain control      Renal mass  Initially presented for difficulty urinating, UA pristine  CT AP reveals left renal mass worrisome for renal cell carcinoma, discussed with patient  Cr 0.7  Urology consulted  Trend Cr, Strict I/Os        VTE Risk Mitigation (From admission, onward)           Ordered     IP VTE HIGH RISK PATIENT  Once         02/21/24 1450                                    Rebeka Blackmon PA-C  Department of Hospital Medicine  [facility] - Emergency Dept

## 2024-02-21 NOTE — ASSESSMENT & PLAN NOTE
Initially presented for difficulty urinating, UA pristine  CT AP reveals left renal mass worrisome for renal cell carcinoma, discussed with patient  Cr 0.7  Urology consulted  Trend Cr, Strict I/Os

## 2024-02-21 NOTE — Clinical Note
Diagnosis: Diverticulitis of intestine with abscess [[id number]]   Future Attending Provider: CHRISTOPHE MAYO [[id number]]   Reason for IP Medical Treatment  (Clinical interventions that can only be accomplished in the IP setting? ) :: Diverticulitis with abscess, renal mass   I certify that Inpatient services for greater than or equal to 2 midnights are medically necessary:: Yes   Plans for Post-Acute care--if anticipated (pick the single best option):: A. No post acute care anticipated at this time

## 2024-02-21 NOTE — FIRST PROVIDER EVALUATION
Emergency Department TeleTriage Encounter Note      CHIEF COMPLAINT    Chief Complaint   Patient presents with    Urinary Frequency     Pt. Complains of unable to urinate x 1 week. Pt. Was prescribed augmentin for 7 days.       VITAL SIGNS   Initial Vitals [02/21/24 0945]   BP Pulse Resp Temp SpO2   136/67 (!) 112 20 97.8 °F (36.6 °C) 98 %      MAP       --            ALLERGIES    Review of patient's allergies indicates:   Allergen Reactions    Ciprofloxacin (bulk) Hives       PROVIDER TRIAGE NOTE  This is a teletriage evaluation of a 73 y.o. female presenting to the ED complaining of urinary urgency. Patient states she feels like she has to urinate frequently, but very little comes out. She denies dysuria. Has been ongoing for one week. Recently diagnosed with diverticulitis.    Patient is alert and oriented. She speaks in complete sentences. She is sitting upright in the chair in no distress.     Initial orders will be placed and care will be transferred to an alternate provider when patient is roomed for a full evaluation. Any additional orders and the final disposition will be determined by that provider.         ORDERS  Labs Reviewed   URINALYSIS, REFLEX TO URINE CULTURE       ED Orders (720h ago, onward)      Start Ordered     Status Ordering Provider    02/21/24 0950 02/21/24 0949  Urinalysis, Reflex to Urine Culture Urine, Clean Catch  STAT         Ordered FEMI JACKSON    02/21/24 0950 02/21/24 0949  Bladder scan  Once        Comments: PRN reason: per post guo removal protocol or symptoms of urinary retention including urge to void, abdominal fullness, or distention.    Notify MD for bladder volume >300 mL        Ordered FEMI JACKSON              Virtual Visit Note: The provider triage portion of this emergency department evaluation and documentation was performed via [employer], a HIPAA-compliant telemedicine application, in concert with a tele-presenter in the room. A face to face patient  evaluation with one of my colleagues will occur once the patient is placed in an emergency department room.      DISCLAIMER: This note was prepared with [employer] voice recognition transcription software. Garbled syntax, mangled pronouns, and other bizarre constructions may be attributed to that software system.

## 2024-02-21 NOTE — PHARMACY MED REC
"Admission Medication History     The home medication history was taken by Irais Cortez CPHT    You may go to "Admission" then "Reconcile Home Medications" tabs to review and/or act upon these items.     The patient was able to verify medications at bedside.  "

## 2024-02-21 NOTE — HPI
"Chely Kramer is a pleasant 73F with history of diverticulitis, glaucoma who presents for difficulty urinating for several days. Every time she goes to urinate she feels as though she is not emptying her bladder completely. She feels like she is "running to the bathroom every ten minutes". If she does not go to urinate when she has the urge then she experiences lower abdominal pain.  No pain or discomfort with urination, no blood in urine. Denies fever, nausea, vomiting, diarrhea. At the end of January she saw her PCP for abdominal pain and was treated with Cipro/Flagyl for diverticulitis, however she developed a rash and was switched to Augmentin. She completed a 7 day course, but says her symptoms persisted. Her last BM was this morning, she does note that her stool is different consistency than usual, describes it as "mandeep"    In ER: afebrile without leukocytosis, mildly tachycardic ; CMP unremarkable, UA unremarkable, CT AP shows findings worrisome for acute on chronic diverticulitis of sigmoid colon with focal abscess in deep pelvis. 4.3cm left renal mass worrisome for renal cell carcinoma. ER discussed case with surgery who recommend IR consult, plan for drainage tomorrow. Urology also consulted for renal mass. Started on IV Zosyn  "

## 2024-02-21 NOTE — SUBJECTIVE & OBJECTIVE
Past Medical History:   Diagnosis Date    Glaucoma        Past Surgical History:   Procedure Laterality Date    ADENOIDECTOMY      APPENDECTOMY  1998    Ruptured    BREAST BIOPSY      BREAST CYST EXCISION      HERNIA REPAIR Right     Ventral    INCISION AND DRAINAGE OF HAND Left 2019    Procedure: INCISION AND DRAINAGE, HAND (ADD ON );  Surgeon: Sameer Evans MD;  Location: [facility];  Service: Orthopedics;  Laterality: Left;  AXILLARY BLOCK  (ADD ON )    TONSILLECTOMY         Review of patient's allergies indicates:   Allergen Reactions    Ciprofloxacin (bulk) Hives    Flagyl [metronidazole] Hives       No current facility-administered medications on file prior to encounter.     Current Outpatient Medications on File Prior to Encounter   Medication Sig    ascorbic acid, vitamin C, (VITAMIN C) 500 MG tablet Take 2,500 mg by mouth once daily.    brimonidine 0.2% (ALPHAGAN) 0.2 % Drop Place 1 drop into both eyes 2 (two) times daily.    dicyclomine (BENTYL) 20 mg tablet Take 1 tablet (20 mg total) by mouth 3 (three) times daily as needed (abdominal cramping).    latanoprost 0.005 % ophthalmic solution Place 1 drop into both eyes every evening.    loteprednol (ALREX) 0.2 % DrpS 1 drop 4 (four) times daily.    pantoprazole (PROTONIX) 40 MG tablet Take 1 tablet (40 mg total) by mouth once daily.    traZODone (DESYREL) 50 MG tablet Take 1/2-1 tablet nightly as needed for insomnia.     Family History       Problem Relation (Age of Onset)    Heart disease Father (74)    Mental illness Brother    Parkinsonism Brother          Tobacco Use    Smoking status: Former     Current packs/day: 0.00     Types: Cigarettes     Quit date: 5/3/1992     Years since quittin.8    Smokeless tobacco: Never   Substance and Sexual Activity    Alcohol use: Yes     Alcohol/week: 4.0 standard drinks of alcohol     Types: 4 Shots of liquor per week    Drug use: No    Sexual activity: Never     Review of Systems   Constitutional:   Negative for chills, diaphoresis and fever.   Respiratory:  Negative for cough and shortness of breath.    Cardiovascular:  Negative for chest pain.   Gastrointestinal:  Positive for abdominal pain. Negative for blood in stool, diarrhea, nausea and vomiting.   Genitourinary:  Positive for decreased urine volume and difficulty urinating. Negative for dysuria.   Musculoskeletal:  Negative for arthralgias and back pain.   Psychiatric/Behavioral:  Negative for agitation and confusion.      Objective:     Vital Signs (Most Recent):  Temp: 97.8 °F (36.6 °C) (02/21/24 0945)  Pulse: (!) 112 (02/21/24 0945)  Resp: 20 (02/21/24 0945)  BP: 136/67 (02/21/24 0945)  SpO2: 98 % (02/21/24 0945) Vital Signs (24h Range):  Temp:  [97.8 °F (36.6 °C)] 97.8 °F (36.6 °C)  Pulse:  [112] 112  Resp:  [20] 20  SpO2:  [98 %] 98 %  BP: (136)/(67) 136/67     Weight: 69.4 kg (153 lb)  Body mass index is 28.91 kg/m².     Physical Exam  Constitutional:       General: She is not in acute distress.     Appearance: Normal appearance. She is not ill-appearing.   HENT:      Head: Normocephalic and atraumatic.   Cardiovascular:      Rate and Rhythm: Normal rate and regular rhythm.   Pulmonary:      Effort: Pulmonary effort is normal. No respiratory distress.   Abdominal:      General: Abdomen is flat. Bowel sounds are normal. There is no distension.      Palpations: Abdomen is soft.      Tenderness: There is no abdominal tenderness.   Neurological:      Mental Status: She is alert.                Significant Labs: All pertinent labs within the past 24 hours have been reviewed.    Significant Imaging: I have reviewed all pertinent imaging results/findings within the past 24 hours.

## 2024-02-21 NOTE — HPI
74yo F with recent abdominal pain now with pelvic abscess related to diverticulitis. Initially presented with urinary frequency and sensation of ZORAN. PVR around 50cc. UA clear. Pelvic abscess noted to be pushing on bladder on imaging today. CT scan had incidental finding of 4.3cm LLP renal mass concerning for RCC. Previously unaware of renal mass. No other upper tract imaging available. Denies hematuria. Former smoker, quit 30 years ago though continued to have second-hand smoke exposure at her job. She reports her sister had part of her kidney removed for a growth a few years ago, she is unsure of pathology. Prior surgical history with ruptured appendicitis complicated by wound issues and eventual ventral hernia repair with mesh.

## 2024-02-22 PROBLEM — E44.0 MODERATE MALNUTRITION: Status: ACTIVE | Noted: 2024-02-22

## 2024-02-22 LAB
ALBUMIN SERPL BCP-MCNC: 3 G/DL (ref 3.5–5.2)
ALP SERPL-CCNC: 77 U/L (ref 55–135)
ALT SERPL W/O P-5'-P-CCNC: 12 U/L (ref 10–44)
ANION GAP SERPL CALC-SCNC: 10 MMOL/L (ref 8–16)
AST SERPL-CCNC: 34 U/L (ref 10–40)
BASOPHILS # BLD AUTO: 0.03 K/UL (ref 0–0.2)
BASOPHILS NFR BLD: 0.3 % (ref 0–1.9)
BILIRUB SERPL-MCNC: 0.3 MG/DL (ref 0.1–1)
BUN SERPL-MCNC: 10 MG/DL (ref 8–23)
CALCIUM SERPL-MCNC: 9.5 MG/DL (ref 8.7–10.5)
CHLORIDE SERPL-SCNC: 102 MMOL/L (ref 95–110)
CO2 SERPL-SCNC: 25 MMOL/L (ref 23–29)
CREAT SERPL-MCNC: 0.8 MG/DL (ref 0.5–1.4)
DIFFERENTIAL METHOD BLD: ABNORMAL
EOSINOPHIL # BLD AUTO: 0.1 K/UL (ref 0–0.5)
EOSINOPHIL NFR BLD: 0.9 % (ref 0–8)
ERYTHROCYTE [DISTWIDTH] IN BLOOD BY AUTOMATED COUNT: 11.9 % (ref 11.5–14.5)
EST. GFR  (NO RACE VARIABLE): >60 ML/MIN/1.73 M^2
GLUCOSE SERPL-MCNC: 109 MG/DL (ref 70–110)
GRAM STN SPEC: NORMAL
HCT VFR BLD AUTO: 35.9 % (ref 37–48.5)
HGB BLD-MCNC: 11.8 G/DL (ref 12–16)
IMM GRANULOCYTES # BLD AUTO: 0.04 K/UL (ref 0–0.04)
IMM GRANULOCYTES NFR BLD AUTO: 0.4 % (ref 0–0.5)
INR PPP: 1.1 (ref 0.8–1.2)
LYMPHOCYTES # BLD AUTO: 1.7 K/UL (ref 1–4.8)
LYMPHOCYTES NFR BLD: 15.7 % (ref 18–48)
MCH RBC QN AUTO: 31.1 PG (ref 27–31)
MCHC RBC AUTO-ENTMCNC: 32.9 G/DL (ref 32–36)
MCV RBC AUTO: 95 FL (ref 82–98)
MONOCYTES # BLD AUTO: 1 K/UL (ref 0.3–1)
MONOCYTES NFR BLD: 9.3 % (ref 4–15)
NEUTROPHILS # BLD AUTO: 7.8 K/UL (ref 1.8–7.7)
NEUTROPHILS NFR BLD: 73.4 % (ref 38–73)
NRBC BLD-RTO: 0 /100 WBC
PLATELET # BLD AUTO: 326 K/UL (ref 150–450)
PMV BLD AUTO: 8.3 FL (ref 9.2–12.9)
POTASSIUM SERPL-SCNC: 3.8 MMOL/L (ref 3.5–5.1)
PROT SERPL-MCNC: 6.6 G/DL (ref 6–8.4)
PROTHROMBIN TIME: 11.9 SEC (ref 9–12.5)
RBC # BLD AUTO: 3.8 M/UL (ref 4–5.4)
SODIUM SERPL-SCNC: 137 MMOL/L (ref 136–145)
WBC # BLD AUTO: 10.58 K/UL (ref 3.9–12.7)

## 2024-02-22 PROCEDURE — 63600175 PHARM REV CODE 636 W HCPCS: Mod: HCNC | Performed by: PHYSICIAN ASSISTANT

## 2024-02-22 PROCEDURE — 85610 PROTHROMBIN TIME: CPT | Mod: HCNC | Performed by: RADIOLOGY

## 2024-02-22 PROCEDURE — 99153 MOD SED SAME PHYS/QHP EA: CPT | Mod: HCNC | Performed by: RADIOLOGY

## 2024-02-22 PROCEDURE — 80053 COMPREHEN METABOLIC PANEL: CPT | Mod: HCNC | Performed by: PHYSICIAN ASSISTANT

## 2024-02-22 PROCEDURE — 94761 N-INVAS EAR/PLS OXIMETRY MLT: CPT | Mod: HCNC

## 2024-02-22 PROCEDURE — 11000001 HC ACUTE MED/SURG PRIVATE ROOM: Mod: HCNC

## 2024-02-22 PROCEDURE — 99900035 HC TECH TIME PER 15 MIN (STAT): Mod: HCNC

## 2024-02-22 PROCEDURE — 87076 CULTURE ANAEROBE IDENT EACH: CPT | Mod: 59,HCNC | Performed by: HOSPITALIST

## 2024-02-22 PROCEDURE — 36415 COLL VENOUS BLD VENIPUNCTURE: CPT | Mod: HCNC | Performed by: RADIOLOGY

## 2024-02-22 PROCEDURE — 99152 MOD SED SAME PHYS/QHP 5/>YRS: CPT | Mod: HCNC | Performed by: RADIOLOGY

## 2024-02-22 PROCEDURE — 63600175 PHARM REV CODE 636 W HCPCS: Mod: HCNC | Performed by: RADIOLOGY

## 2024-02-22 PROCEDURE — 87205 SMEAR GRAM STAIN: CPT | Mod: HCNC | Performed by: HOSPITALIST

## 2024-02-22 PROCEDURE — 25000003 PHARM REV CODE 250: Mod: HCNC | Performed by: PHYSICIAN ASSISTANT

## 2024-02-22 PROCEDURE — 99231 SBSQ HOSP IP/OBS SF/LOW 25: CPT | Mod: HCNC,,, | Performed by: SPECIALIST

## 2024-02-22 PROCEDURE — 25000003 PHARM REV CODE 250: Mod: HCNC | Performed by: RADIOLOGY

## 2024-02-22 PROCEDURE — 87070 CULTURE OTHR SPECIMN AEROBIC: CPT | Mod: HCNC | Performed by: HOSPITALIST

## 2024-02-22 PROCEDURE — 85025 COMPLETE CBC W/AUTO DIFF WBC: CPT | Mod: HCNC | Performed by: PHYSICIAN ASSISTANT

## 2024-02-22 PROCEDURE — 27000221 HC OXYGEN, UP TO 24 HOURS: Mod: HCNC

## 2024-02-22 PROCEDURE — 87075 CULTR BACTERIA EXCEPT BLOOD: CPT | Mod: HCNC | Performed by: HOSPITALIST

## 2024-02-22 PROCEDURE — 51798 US URINE CAPACITY MEASURE: CPT | Mod: HCNC

## 2024-02-22 RX ORDER — FENTANYL CITRATE 50 UG/ML
INJECTION, SOLUTION INTRAMUSCULAR; INTRAVENOUS
Status: DISCONTINUED | OUTPATIENT
Start: 2024-02-22 | End: 2024-02-22 | Stop reason: HOSPADM

## 2024-02-22 RX ORDER — OXYCODONE AND ACETAMINOPHEN 5; 325 MG/1; MG/1
TABLET ORAL
Status: DISCONTINUED | OUTPATIENT
Start: 2024-02-22 | End: 2024-02-22 | Stop reason: HOSPADM

## 2024-02-22 RX ORDER — MIDAZOLAM HYDROCHLORIDE 1 MG/ML
INJECTION INTRAMUSCULAR; INTRAVENOUS
Status: DISCONTINUED | OUTPATIENT
Start: 2024-02-22 | End: 2024-02-22 | Stop reason: HOSPADM

## 2024-02-22 RX ADMIN — LATANOPROST 1 DROP: 50 SOLUTION/ DROPS OPHTHALMIC at 09:02

## 2024-02-22 RX ADMIN — BRIMONIDINE TARTRATE 1 DROP: 1.5 SOLUTION/ DROPS OPHTHALMIC at 09:02

## 2024-02-22 RX ADMIN — TRAZODONE HYDROCHLORIDE 50 MG: 50 TABLET ORAL at 09:02

## 2024-02-22 RX ADMIN — Medication 6 MG: at 01:02

## 2024-02-22 RX ADMIN — ACETAMINOPHEN 650 MG: 325 TABLET, FILM COATED ORAL at 11:02

## 2024-02-22 RX ADMIN — PIPERACILLIN SODIUM AND TAZOBACTAM SODIUM 4.5 G: 4; .5 INJECTION, POWDER, LYOPHILIZED, FOR SOLUTION INTRAVENOUS at 09:02

## 2024-02-22 RX ADMIN — PIPERACILLIN SODIUM AND TAZOBACTAM SODIUM 4.5 G: 4; .5 INJECTION, POWDER, LYOPHILIZED, FOR SOLUTION INTRAVENOUS at 02:02

## 2024-02-22 RX ADMIN — PIPERACILLIN SODIUM AND TAZOBACTAM SODIUM 4.5 G: 4; .5 INJECTION, POWDER, LYOPHILIZED, FOR SOLUTION INTRAVENOUS at 05:02

## 2024-02-22 RX ADMIN — Medication 6 MG: at 11:02

## 2024-02-22 RX ADMIN — HYDROCODONE BITARTRATE AND ACETAMINOPHEN 1 TABLET: 10; 325 TABLET ORAL at 07:02

## 2024-02-22 NOTE — PROGRESS NOTES
"[facility] Surg [address] Medicine  Progress Note    Patient Name: Chely Kramer  MRN: [id number]  Patient Class: IP- Inpatient   Admission Date: 2/21/2024  Length of Stay: 1 days  Attending Physician: Judith Castro MD  Primary Care Provider: KIA Herrera MD        Subjective:     Principal Problem:Diverticulitis of large intestine with abscess without bleeding        HPI:  Chely Kramer is a pleasant 73F with history of diverticulitis, glaucoma who presents for difficulty urinating for several days. Every time she goes to urinate she feels as though she is not emptying her bladder completely. She feels like she is "running to the bathroom every ten minutes". If she does not go to urinate when she has the urge then she experiences lower abdominal pain.  No pain or discomfort with urination, no blood in urine. Denies fever, nausea, vomiting, diarrhea. At the end of January she saw her PCP for abdominal pain and was treated with Cipro/Flagyl for diverticulitis, however she developed a rash and was switched to Augmentin. She completed a 7 day course, but says her symptoms persisted. Her last BM was this morning, she does note that her stool is different consistency than usual, describes it as "mandeep"    In ER: afebrile without leukocytosis, mildly tachycardic ; CMP unremarkable, UA unremarkable, CT AP shows findings worrisome for acute on chronic diverticulitis of sigmoid colon with focal abscess in deep pelvis. 4.3cm left renal mass worrisome for renal cell carcinoma. ER discussed case with surgery who recommend IR consult, plan for drainage tomorrow. Urology also consulted for renal mass. Started on IV Zosyn    Overview/Hospital Course:  Ms. Kramer presented with dysuria.  Admitted with diverticulitis with associated abscess and noted renal mass on imaging.  Empirically started on Zosyn.  General surgery, urology and IR consulted.    Interval History:  No acute events, returned from IR " surgical suite after drainage of abscess was done and TANYA drain left in place.  Patient reports continued urgency and frequency with urination and incomplete voiding sensation that persists, but does state she feels overall better than when she came in.    Review of Systems   Constitutional:  Negative for chills and fever.   Respiratory:  Negative for shortness of breath.    Gastrointestinal:  Positive for abdominal pain.   Genitourinary:  Positive for decreased urine volume, dysuria, frequency and urgency.     Objective:     Vital Signs (Most Recent):  Temp: 99.1 °F (37.3 °C) (02/22/24 0811)  Pulse: 75 (02/22/24 1101)  Resp: 20 (02/22/24 1101)  BP: (!) 117/58 (02/22/24 1101)  SpO2: 95 % (02/22/24 1101) Vital Signs (24h Range):  Temp:  [97.4 °F (36.3 °C)-99.1 °F (37.3 °C)] 99.1 °F (37.3 °C)  Pulse:  [65-99] 75  Resp:  [11-97] 20  SpO2:  [92 %-98 %] 95 %  BP: (107-142)/(53-91) 117/58     Weight: 69.4 kg (153 lb 0.1 oz)  Body mass index is 28.91 kg/m².    Intake/Output Summary (Last 24 hours) at 2/22/2024 1619  Last data filed at 2/22/2024 1419  Gross per 24 hour   Intake 10 ml   Output 330 ml   Net -320 ml         Physical Exam  Constitutional:       General: She is not in acute distress.     Appearance: Normal appearance. She is not ill-appearing.   HENT:      Head: Normocephalic and atraumatic.      Nose: Nose normal.   Eyes:      Extraocular Movements: Extraocular movements intact.      Conjunctiva/sclera: Conjunctivae normal.   Cardiovascular:      Rate and Rhythm: Normal rate and regular rhythm.   Pulmonary:      Effort: Pulmonary effort is normal. No respiratory distress.   Abdominal:      General: Abdomen is flat. Bowel sounds are normal. There is no distension.      Palpations: Abdomen is soft.      Tenderness: There is no abdominal tenderness. There is no guarding or rebound.   Genitourinary:     Comments: Drain left in place connected to TANYA rising from posterior lateral flank  Musculoskeletal:          General: Normal range of motion.      Cervical back: Normal range of motion.   Skin:     General: Skin is warm.   Neurological:      Mental Status: She is alert and oriented to person, place, and time.   Psychiatric:         Mood and Affect: Mood normal.         Behavior: Behavior normal.         Thought Content: Thought content normal.             Significant Labs: All pertinent labs within the past 24 hours have been reviewed.    Significant Imaging: I have reviewed all pertinent imaging results/findings within the past 24 hours.    Assessment/Plan:      * Diverticulitis of large intestine with abscess without bleeding  - Diagnosed with diverticulitis at the end of January, prescribed Cipro/Flagyl but developed a rash and was switched to Augmentin; CT AP on admission revealed acute on chronic diverticulitis with deep pelvic abscess. 1/4 SIRS for tachycardia on admission  - Continue IV Zosyn  - Surgery consulted, appreciate input  - IR consulted, drainage of abscess done today  - Continue pain control and supportive care  - Follow up on cultures    Renal mass  - Initially presented for difficulty urinating, UA pristine  - CT AP reveals left renal mass worrisome for renal cell carcinoma, discussed with patient  - Urology consulted, appreciate input.  Concerning for renal carcinoma. Likely plan for outpatient follow up with Dr Giles,  onc, to discuss renal mass excision   - Continue to trend with labs      VTE Risk Mitigation (From admission, onward)           Ordered     IP VTE HIGH RISK PATIENT  Once         02/21/24 6435                      Judith Castro MD  Department of Hospital Medicine   [facility] - Med Surg ([address])

## 2024-02-22 NOTE — CONSULTS
[facility] ([address])  Urology  Consult Note    Patient Name: Chely Kramer  MRN: [id number]  Admission Date: 2/21/2024  Hospital Length of Stay: 0   Code Status: Full Code   Attending Provider: Judith Castro MD   Consulting Provider: Aria Borja MD  Primary Care Physician: KIA Herrera MD  Principal Problem:Diverticulitis of large intestine with abscess without bleeding    Inpatient consult to Urology  Consult performed by: Aria Borja MD  Consult ordered by: Carlos Alberto Lennon NP          Subjective:     HPI:  72yo F with recent abdominal pain now with pelvic abscess related to diverticulitis. Initially presented with urinary frequency and sensation of ZORAN. PVR around 50cc. UA clear. Pelvic abscess noted to be pushing on bladder on imaging today. CT scan had incidental finding of 4.3cm LLP renal mass concerning for RCC. Previously unaware of renal mass. No other upper tract imaging available. Denies hematuria. Former smoker, quit 30 years ago though continued to have second-hand smoke exposure at her job. She reports her sister had part of her kidney removed for a growth a few years ago, she is unsure of pathology. Prior surgical history with ruptured appendicitis complicated by wound issues and eventual ventral hernia repair with mesh.     Past Medical History:   Diagnosis Date    Glaucoma        Past Surgical History:   Procedure Laterality Date    ADENOIDECTOMY      APPENDECTOMY  1998    Ruptured    BREAST BIOPSY      BREAST CYST EXCISION      HERNIA REPAIR Right 2003    Ventral    INCISION AND DRAINAGE OF HAND Left 1/22/2019    Procedure: INCISION AND DRAINAGE, HAND (ADD ON );  Surgeon: Sameer Evans MD;  Location: [facility];  Service: Orthopedics;  Laterality: Left;  AXILLARY BLOCK  (ADD ON )    TONSILLECTOMY         Review of patient's allergies indicates:   Allergen Reactions    Ciprofloxacin (bulk) Hives    Flagyl [metronidazole] Hives       Family History       Problem  Relation (Age of Onset)    Heart disease Father (74)    Mental illness Brother    Parkinsonism Brother            Tobacco Use    Smoking status: Former     Current packs/day: 0.00     Types: Cigarettes     Quit date: 5/3/1992     Years since quittin.8    Smokeless tobacco: Never   Substance and Sexual Activity    Alcohol use: Yes     Alcohol/week: 4.0 standard drinks of alcohol     Types: 4 Shots of liquor per week    Drug use: No    Sexual activity: Never       Review of Systems   Constitutional:  Negative for appetite change, chills and fever.   HENT:  Negative for congestion, sore throat and trouble swallowing.    Eyes:  Negative for pain and itching.   Respiratory:  Negative for cough and shortness of breath.    Cardiovascular:  Negative for chest pain, palpitations and leg swelling.   Gastrointestinal:  Positive for abdominal pain. Negative for abdominal distention, constipation, diarrhea, nausea and vomiting.   Genitourinary:  Positive for frequency and urgency. Negative for difficulty urinating, dysuria, flank pain and hematuria.   Musculoskeletal:  Negative for back pain, neck pain and neck stiffness.   Skin:  Negative for rash and wound.   Neurological:  Negative for dizziness and seizures.   Hematological:  Negative for adenopathy. Does not bruise/bleed easily.   Psychiatric/Behavioral:  Negative for confusion. The patient is not nervous/anxious.    All other systems reviewed and are negative.      Objective:     Temp:  [97.4 °F (36.3 °C)-98.5 °F (36.9 °C)] 97.4 °F (36.3 °C)  Pulse:  [] 82  Resp:  [18-20] 18  SpO2:  [95 %-99 %] 95 %  BP: (125-136)/(60-68) 126/60  Weight: 69.4 kg (153 lb 0.1 oz)  Body mass index is 28.91 kg/m².    Date 24 0700 - 24 0659   Shift 9102-7168 5560-2915 6968-3020 24 Hour Total   INTAKE   IV Piggyback 100   100   Shift Total(mL/kg) 100(1.4)   100(1.4)   OUTPUT   Shift Total(mL/kg)       Weight (kg) 69.4 69.4 69.4 69.4     Bladder Scan Volume (mL): 51 mL  "(02/21/24 1052)    Drains       None                    Physical Exam  Vitals reviewed.   Constitutional:       General: She is not in acute distress.     Appearance: She is well-developed. She is not diaphoretic.   HENT:      Head: Normocephalic and atraumatic.   Pulmonary:      Effort: Pulmonary effort is normal. No respiratory distress.   Abdominal:      General: There is no distension.      Palpations: Abdomen is soft. There is no mass.      Tenderness: There is no abdominal tenderness. There is no right CVA tenderness, left CVA tenderness, guarding or rebound.      Comments: Well healed right abdominal wound c/w PSH   Musculoskeletal:         General: Normal range of motion.      Cervical back: Normal range of motion.   Skin:     General: Skin is warm and dry.      Findings: No erythema or rash.   Neurological:      Mental Status: She is alert and oriented to person, place, and time.   Psychiatric:         Behavior: Behavior normal.      Significant Labs:    BMP:  Recent Labs   Lab 02/21/24  1122      K 3.8   CL 99   CO2 28   BUN 10   CREATININE 0.7   CALCIUM 9.2       CBC:  Recent Labs   Lab 02/21/24  1122   WBC 9.51   HGB 12.2   HCT 36.8*          Blood Culture: No results for input(s): "LABBLOO" in the last 168 hours.  Urine Culture: No results for input(s): "LABURIN" in the last 168 hours.  Urine Studies:   Recent Labs   Lab 02/21/24  1010   COLORU Yellow   APPEARANCEUA Clear   PHUR 7.0   SPECGRAV 1.010   PROTEINUA Negative   GLUCUA Negative   KETONESU Negative   BILIRUBINUA Negative   OCCULTUA Negative   NITRITE Negative   UROBILINOGEN Negative   LEUKOCYTESUR Negative       Significant Imaging:  All pertinent imaging results/findings from the past 24 hours have been reviewed.                     Assessment and Plan:     Frequency of urination   - No evidence of UTI   - PVR low   - Suspect mass effect on bladder 2/2 pelvic abscess. Also may have some inflammatory component.   - Due to " significant bother to pt, trial Ditropan PRN for bladder spasms. Caution use in setting of glaucoma. B-agonist not available on formulary while admitted. Hopefully will improve with drainage of pelvic abscess    Renal mass   - 4.3cm LLP renal mass concerning for RCC. No evidence of metastasis on current imaging.    - Discussed surgical management of renal masses concerning for malignancy. Current pelvic abscess/diverticulitis will need to resolve prior to renal mass surgery. Expect significant intraabdominal adhesions.    - Likely plan for outpatient follow up with Dr Giles,  onc, to discuss renal mass excision        VTE Risk Mitigation (From admission, onward)           Ordered     IP VTE HIGH RISK PATIENT  Once         02/21/24 7502                    Thank you for your consult. I will follow-up with patient. Please contact us if you have any additional questions.    Aria Borja MD  Urology  [facility] ([address])

## 2024-02-22 NOTE — ASSESSMENT & PLAN NOTE
- Initially presented for difficulty urinating, UA pristine  - CT AP reveals left renal mass worrisome for renal cell carcinoma, discussed with patient  - Urology consulted, appreciate input.  Concerning for renal carcinoma. Likely plan for outpatient follow up with Dr Giles,  onc, to discuss renal mass excision   - Continue to trend with labs

## 2024-02-22 NOTE — ASSESSMENT & PLAN NOTE
- Diagnosed with diverticulitis at the end of January, prescribed Cipro/Flagyl but developed a rash and was switched to Augmentin; CT AP on admission revealed acute on chronic diverticulitis with deep pelvic abscess. 1/4 SIRS for tachycardia on admission  - Continue IV Zosyn  - Surgery consulted, appreciate input  - IR consulted, drainage of abscess done today  - Continue pain control and supportive care  - Follow up on cultures

## 2024-02-22 NOTE — ASSESSMENT & PLAN NOTE
- 4.3cm LLP renal mass concerning for RCC. No evidence of metastasis on current imaging.    - Discussed surgical management of renal masses concerning for malignancy. Current pelvic abscess/diverticulitis will need to resolve prior to renal mass surgery. Expect significant intraabdominal adhesions.    - Likely plan for outpatient follow up with Dr Giles,  onc, to discuss renal mass excision

## 2024-02-22 NOTE — PLAN OF CARE
AAOX4. VSS. Admission, assessment, and 4 eyes on skin complete. Educated pt on room, call light, and answered questions. Given gowns and non slip socks, bed locked in lowest position, call light in reach, and bed alarm set. No falls or injuries on shift. Safety maintained throughout shift. Pt admitted to floor around 1800.   Problem: Adult Inpatient Plan of Care  Goal: Plan of Care Review  Outcome: Ongoing, Progressing  Goal: Patient-Specific Goal (Individualized)  Outcome: Ongoing, Progressing  Goal: Absence of Hospital-Acquired Illness or Injury  Outcome: Ongoing, Progressing  Goal: Optimal Comfort and Wellbeing  Outcome: Ongoing, Progressing  Goal: Readiness for Transition of Care  Outcome: Ongoing, Progressing     Problem: Infection  Goal: Absence of Infection Signs and Symptoms  Outcome: Ongoing, Progressing

## 2024-02-22 NOTE — PROCEDURES
Radiology Post-Procedure Note    Pre Op Diagnosis:  pelvic  diverticular abscess    Post Op Diagnosis:  same    Procedure: pelvic abscess drainage    Procedure performed by: Fernando Thurman MD    Written Informed Consent Obtained: Yes    Specimen Removed: YES pus    Estimated Blood Loss: less than 50     Findings: CT was used for localization of abnormal fluid collection. A needle was inserted into the fluid collection and purulent fluid was aspirated.  A wire was inserted into the collection and the tract was dilated.  A 10.0 Frisian all-purpose drainage catheter was inserted and a pigtail loop of the distal end was formed.  The catheter was sutured into place and approximately  3 mL fluid was removed initially for C/S.  Catheter then connected to suction bulb.     A specimen was sent to the lab for further analysis and culture.    The patient tolerated procedure well and there were no complications. Please see procedure report under Imaging for further details.    Fernando Thurman MD  Staff Radiologist  Department of Radiology  Pager: [phone]

## 2024-02-22 NOTE — ASSESSMENT & PLAN NOTE
- No evidence of UTI   - PVR low   - Suspect mass effect on bladder 2/2 pelvic abscess. Also may have some inflammatory component.   - Due to significant bother to pt, trial Ditropan PRN for bladder spasms. Caution use in setting of glaucoma. B-agonist not available on formulary while admitted. Hopefully will improve with drainage of pelvic abscess

## 2024-02-22 NOTE — SUBJECTIVE & OBJECTIVE
Interval History:  No acute events, returned from IR surgical suite after drainage of abscess was done and TANYA drain left in place.  Patient reports continued urgency and frequency with urination and incomplete voiding sensation that persists, but does state she feels overall better than when she came in.    Review of Systems   Constitutional:  Negative for chills and fever.   Respiratory:  Negative for shortness of breath.    Gastrointestinal:  Positive for abdominal pain.   Genitourinary:  Positive for decreased urine volume, dysuria, frequency and urgency.     Objective:     Vital Signs (Most Recent):  Temp: 99.1 °F (37.3 °C) (02/22/24 0811)  Pulse: 75 (02/22/24 1101)  Resp: 20 (02/22/24 1101)  BP: (!) 117/58 (02/22/24 1101)  SpO2: 95 % (02/22/24 1101) Vital Signs (24h Range):  Temp:  [97.4 °F (36.3 °C)-99.1 °F (37.3 °C)] 99.1 °F (37.3 °C)  Pulse:  [65-99] 75  Resp:  [11-97] 20  SpO2:  [92 %-98 %] 95 %  BP: (107-142)/(53-91) 117/58     Weight: 69.4 kg (153 lb 0.1 oz)  Body mass index is 28.91 kg/m².    Intake/Output Summary (Last 24 hours) at 2/22/2024 1619  Last data filed at 2/22/2024 1419  Gross per 24 hour   Intake 10 ml   Output 330 ml   Net -320 ml         Physical Exam  Constitutional:       General: She is not in acute distress.     Appearance: Normal appearance. She is not ill-appearing.   HENT:      Head: Normocephalic and atraumatic.      Nose: Nose normal.   Eyes:      Extraocular Movements: Extraocular movements intact.      Conjunctiva/sclera: Conjunctivae normal.   Cardiovascular:      Rate and Rhythm: Normal rate and regular rhythm.   Pulmonary:      Effort: Pulmonary effort is normal. No respiratory distress.   Abdominal:      General: Abdomen is flat. Bowel sounds are normal. There is no distension.      Palpations: Abdomen is soft.      Tenderness: There is no abdominal tenderness. There is no guarding or rebound.   Genitourinary:     Comments: Drain left in place connected to TANYA rising from  posterior lateral flank  Musculoskeletal:         General: Normal range of motion.      Cervical back: Normal range of motion.   Skin:     General: Skin is warm.   Neurological:      Mental Status: She is alert and oriented to person, place, and time.   Psychiatric:         Mood and Affect: Mood normal.         Behavior: Behavior normal.         Thought Content: Thought content normal.             Significant Labs: All pertinent labs within the past 24 hours have been reviewed.    Significant Imaging: I have reviewed all pertinent imaging results/findings within the past 24 hours.

## 2024-02-22 NOTE — HOSPITAL COURSE
Ms. Kramer presented with dysuria.  Admitted with diverticulitis with associated abscess and noted renal mass on imaging.  Empirically started on Zosyn.  General surgery, Urology and IR consulted.  Drainage of abscess with drain in place on 2/22 performed by IR. Repeat CT scan showed resolution of abscess. She clinically improved, remained AF and WBC was normal. Patient completed 5 days of IV Zosyn and was transitioned to PO Augmentin to complete 10 day course of treatment in total. Drain to remain in place until seen in General surgery clinic within 1 week. Patient to follow up with Dr. Pineda within 1 week, and follow up with Urology to address the renal mass and follow up with her PCP.    * Diverticulitis of large intestine with abscess without bleeding  - Diagnosed with diverticulitis at the end of January, prescribed Cipro/Flagyl but developed a rash and was switched to Augmentin; CT AP on admission revealed acute on chronic diverticulitis with deep pelvic abscess. 1/4 SIRS for tachycardia on admission  - Treated with IV Zosyn  - Surgery consulted, appreciated input  - IR consulted, drainage of abscess with drain in place performed on 2/22  - Repeat CT scan of the abdomen pelvis showed ongoing sigmoid diverticulitis and apparent resolution of the previously identified deep pelvic access with percutaneous drain in place on 02/23  - Clinically improved, increased urine output and with decreased symptoms of dysuria   - Advanced diet to low residue as per General surgery without difficulty  - Continued pain control and supportive care  - Culture from abscess were still pending prior to discharge   - Patient was seen and examined on day of discharge. She had minimal abdominal pain controlled with Tylenol and urinary symptoms improved. She was AF, vital signs stable and WBC remained normal. Case discussed with General surgery, patient was transitioned to p.o. Augmentin to complete full 10 day course of treatment with  antibiotics.  - Drain to abscess to remain in place until seen in General surgery clinic  - Follow up with Dr. Pineda (General surgery) within 1 week, plan for 3/2/24     Renal mass  - Initially presented for difficulty urinating, UA pristine  - CT AP showed left renal mass worrisome for renal cell carcinoma, discussed with patient in detail  - Urology consulted, appreciate input.  Concerning for renal carcinoma. Plan for outpatient follow up with Dr Giles,  onc, to discuss renal mass excision and treatment plan      Frequency of urination  - Likely spasm but further workup with repeat CT done as discussed above  - Ditropan PRN for spasm and symptom improved with less frequency  - Probable irritation of bladder from diverticulitis/infection which was improving with treatment

## 2024-02-22 NOTE — PLAN OF CARE
Patient AAOX3, independent at baseline. PCP correct on facesheet. Patient denies owning any DME. Family to provide transportation home.    02/22/24 1417   Discharge Assessment   Assessment Type Discharge Planning Assessment   Confirmed/corrected address, phone number and insurance Yes   Confirmed Demographics Correct on Facesheet   Source of Information patient   Communicated ALEJANDRO with patient/caregiver Date not available/Unable to determine   People in Home alone   Do you expect to return to your current living situation? Yes   Do you have help at home or someone to help you manage your care at home? No   Prior to hospitilization cognitive status: Alert/Oriented   Current cognitive status: Alert/Oriented   Walking or Climbing Stairs Difficulty no   Dressing/Bathing Difficulty no   Equipment Currently Used at Home none   Readmission within 30 days? No   Do you currently have service(s) that help you manage your care at home? No   Do you have prescription coverage? Yes   Do you have any problems affording any of your prescribed medications? No   Is the patient taking medications as prescribed? yes   How do you get to doctors appointments? car, drives self   Are you on dialysis? No   Do you take coumadin? No   Discharge Plan A Home with family   Discharge Plan B Home   DME Needed Upon Discharge  none   Discharge Plan discussed with: Patient   Transition of Care Barriers None   Physical Activity   On average, how many days per week do you engage in moderate to strenuous exercise (like a brisk walk)? 5 days   On average, how many minutes do you engage in exercise at this level? 60 min   Financial Resource Strain   How hard is it for you to pay for the very basics like food, housing, medical care, and heating? Not hard   Housing Stability   In the last 12 months, was there a time when you were not able to pay the mortgage or rent on time? N   In the last 12 months, was there a time when you did not have a steady place to  sleep or slept in a shelter (including now)? N   Transportation Needs   In the past 12 months, has lack of transportation kept you from medical appointments or from getting medications? no   In the past 12 months, has lack of transportation kept you from meetings, work, or from getting things needed for daily living? No   Food Insecurity   Within the past 12 months, you worried that your food would run out before you got the money to buy more. Never true   Within the past 12 months, the food you bought just didn't last and you didn't have money to get more. Never true   Stress   Do you feel stress - tense, restless, nervous, or anxious, or unable to sleep at night because your mind is troubled all the time - these days? Not at all   Social Connections   In a typical week, how many times do you talk on the phone with family, friends, or neighbors? More than 3   How often do you get together with friends or relatives? Never   How often do you attend Methodist or Voodoo services? Never   Do you belong to any clubs or organizations such as Methodist groups, unions, fraternal or athletic groups, or school groups? No   How often do you attend meetings of the clubs or organizations you belong to? Never   Are you , , , , never , or living with a partner? Never marrie   Alcohol Use   Q1: How often do you have a drink containing alcohol? Monthly or l   Q2: How many drinks containing alcohol do you have on a typical day when you are drinking? 1 or 2   Q3: How often do you have six or more drinks on one occasion? Never     [facility] ([address])  Initial Discharge Assessment       Primary Care Provider: KIA Herrera MD    Admission Diagnosis: Dysuria [R30.0]  Abscess [L02.91]  Renal mass [N28.89]  Difficulty urinating [R39.198]  Sigmoid diverticulitis [K57.32]  Chest pain [R07.9]  Diverticulitis of large intestine with abscess without bleeding [K57.20]  Diverticulitis of  intestine with abscess [K57.80]    Admission Date: 2/21/2024  Expected Discharge Date: 2/24/2024    Transition of Care Barriers: (P) None    Payor: HU[employer] MANAGED MEDICARE / Plan: HU[employer] MEDICARE HMO / Product Type: Capitation /     Extended Emergency Contact Information  Primary Emergency Contact: Kelsy Anderson  Mobile Phone: [phone]  Relation: Friend  Preferred language: English   needed? No  Secondary Emergency Contact: Ana Delacruz  Mobile Phone: [phone]  Relation: Sister  Preferred language: English   needed? No    Discharge Plan A: (P) Home with family  Discharge Plan B: (P) Home      [facility] [place] [address] [facility]  [address] [place] [id number]  Phone: [phone] [phone] Fax: [phone]      Initial Assessment (most recent)       Adult Discharge Assessment - 02/22/24 1417          Discharge Assessment    Assessment Type Discharge Planning Assessment     Confirmed/corrected address, phone number and insurance Yes     Confirmed Demographics Correct on Facesheet     Source of Information patient     Communicated ALEJANDRO with patient/caregiver Date not available/Unable to determine     People in Home alone     Do you expect to return to your current living situation? Yes     Do you have help at home or someone to help you manage your care at home? No     Prior to hospitilization cognitive status: Alert/Oriented     Current cognitive status: Alert/Oriented     Walking or Climbing Stairs Difficulty no     Dressing/Bathing Difficulty no     Equipment Currently Used at Home none     Readmission within 30 days? No     Do you currently have service(s) that help you manage your care at home? No     Do you have prescription coverage? Yes     Do you have any problems affording any of your prescribed medications? No     Is the patient taking medications as prescribed? yes     How do you get to doctors appointments? car, drives self (P)      Are you on dialysis? No (P)       Do you take coumadin? No (P)      Discharge Plan A Home with family (P)      Discharge Plan B Home (P)      DME Needed Upon Discharge  none (P)      Discharge Plan discussed with: Patient (P)      Transition of Care Barriers None (P)         Physical Activity    On average, how many days per week do you engage in moderate to strenuous exercise (like a brisk walk)? 5 days (P)      On average, how many minutes do you engage in exercise at this level? 60 min (P)         Financial Resource Strain    How hard is it for you to pay for the very basics like food, housing, medical care, and heating? Not hard at all (P)         Housing Stability    In the last 12 months, was there a time when you were not able to pay the mortgage or rent on time? No (P)      In the last 12 months, was there a time when you did not have a steady place to sleep or slept in a shelter (including now)? No (P)         Transportation Needs    In the past 12 months, has lack of transportation kept you from medical appointments or from getting medications? No (P)      In the past 12 months, has lack of transportation kept you from meetings, work, or from getting things needed for daily living? No (P)         Food Insecurity    Within the past 12 months, you worried that your food would run out before you got the money to buy more. Never true (P)      Within the past 12 months, the food you bought just didn't last and you didn't have money to get more. Never true (P)         Stress    Do you feel stress - tense, restless, nervous, or anxious, or unable to sleep at night because your mind is troubled all the time - these days? Not at all (P)         Social Connections    In a typical week, how many times do you talk on the phone with family, friends, or neighbors? More than three times a week (P)      How often do you get together with friends or relatives? Never (P)      How often do you attend Worship or Congregational services? Never (P)      Do you  belong to any clubs or organizations such as Jew groups, unions, fraternal or athletic groups, or school groups? No (P)      How often do you attend meetings of the clubs or organizations you belong to? Never (P)      Are you , , , , never , or living with a partner? Never  (P)         Alcohol Use    Q1: How often do you have a drink containing alcohol? Monthly or less (P)      Q2: How many drinks containing alcohol do you have on a typical day when you are drinking? 1 or 2 (P)      Q3: How often do you have six or more drinks on one occasion? Never (P)

## 2024-02-22 NOTE — SUBJECTIVE & OBJECTIVE
Past Medical History:   Diagnosis Date    Glaucoma        Past Surgical History:   Procedure Laterality Date    ADENOIDECTOMY      APPENDECTOMY  1998    Ruptured    BREAST BIOPSY      BREAST CYST EXCISION      HERNIA REPAIR Right     Ventral    INCISION AND DRAINAGE OF HAND Left 2019    Procedure: INCISION AND DRAINAGE, HAND (ADD ON );  Surgeon: Sameer Evans MD;  Location: [facility];  Service: Orthopedics;  Laterality: Left;  AXILLARY BLOCK  (ADD ON )    TONSILLECTOMY         Review of patient's allergies indicates:   Allergen Reactions    Ciprofloxacin (bulk) Hives    Flagyl [metronidazole] Hives       Family History       Problem Relation (Age of Onset)    Heart disease Father (74)    Mental illness Brother    Parkinsonism Brother            Tobacco Use    Smoking status: Former     Current packs/day: 0.00     Types: Cigarettes     Quit date: 5/3/1992     Years since quittin.8    Smokeless tobacco: Never   Substance and Sexual Activity    Alcohol use: Yes     Alcohol/week: 4.0 standard drinks of alcohol     Types: 4 Shots of liquor per week    Drug use: No    Sexual activity: Never       Review of Systems   Constitutional:  Negative for appetite change, chills and fever.   HENT:  Negative for congestion, sore throat and trouble swallowing.    Eyes:  Negative for pain and itching.   Respiratory:  Negative for cough and shortness of breath.    Cardiovascular:  Negative for chest pain, palpitations and leg swelling.   Gastrointestinal:  Positive for abdominal pain. Negative for abdominal distention, constipation, diarrhea, nausea and vomiting.   Genitourinary:  Positive for frequency and urgency. Negative for difficulty urinating, dysuria, flank pain and hematuria.   Musculoskeletal:  Negative for back pain, neck pain and neck stiffness.   Skin:  Negative for rash and wound.   Neurological:  Negative for dizziness and seizures.   Hematological:  Negative for adenopathy. Does not  "bruise/bleed easily.   Psychiatric/Behavioral:  Negative for confusion. The patient is not nervous/anxious.    All other systems reviewed and are negative.      Objective:     Temp:  [97.4 °F (36.3 °C)-98.5 °F (36.9 °C)] 97.4 °F (36.3 °C)  Pulse:  [] 82  Resp:  [18-20] 18  SpO2:  [95 %-99 %] 95 %  BP: (125-136)/(60-68) 126/60  Weight: 69.4 kg (153 lb 0.1 oz)  Body mass index is 28.91 kg/m².    Date 02/21/24 0700 - 02/22/24 0659   Shift 6635-4684 5017-6418 0156-3913 24 Hour Total   INTAKE   IV Piggyback 100   100   Shift Total(mL/kg) 100(1.4)   100(1.4)   OUTPUT   Shift Total(mL/kg)       Weight (kg) 69.4 69.4 69.4 69.4     Bladder Scan Volume (mL): 51 mL (02/21/24 1052)    Drains       None                    Physical Exam  Vitals reviewed.   Constitutional:       General: She is not in acute distress.     Appearance: She is well-developed. She is not diaphoretic.   HENT:      Head: Normocephalic and atraumatic.   Pulmonary:      Effort: Pulmonary effort is normal. No respiratory distress.   Abdominal:      General: There is no distension.      Palpations: Abdomen is soft. There is no mass.      Tenderness: There is no abdominal tenderness. There is no right CVA tenderness, left CVA tenderness, guarding or rebound.      Comments: Well healed right abdominal wound c/w PSH   Musculoskeletal:         General: Normal range of motion.      Cervical back: Normal range of motion.   Skin:     General: Skin is warm and dry.      Findings: No erythema or rash.   Neurological:      Mental Status: She is alert and oriented to person, place, and time.   Psychiatric:         Behavior: Behavior normal.      Significant Labs:    BMP:  Recent Labs   Lab 02/21/24  1122      K 3.8   CL 99   CO2 28   BUN 10   CREATININE 0.7   CALCIUM 9.2       CBC:  Recent Labs   Lab 02/21/24  1122   WBC 9.51   HGB 12.2   HCT 36.8*          Blood Culture: No results for input(s): "LABBLOO" in the last 168 hours.  Urine Culture: No " "results for input(s): "LABURIN" in the last 168 hours.  Urine Studies:   Recent Labs   Lab 02/21/24  1010   COLORU Yellow   APPEARANCEUA Clear   PHUR 7.0   SPECGRAV 1.010   PROTEINUA Negative   GLUCUA Negative   KETONESU Negative   BILIRUBINUA Negative   OCCULTUA Negative   NITRITE Negative   UROBILINOGEN Negative   LEUKOCYTESUR Negative       Significant Imaging:  All pertinent imaging results/findings from the past 24 hours have been reviewed.                  "

## 2024-02-22 NOTE — H&P
Inpatient Radiology Pre-procedure Note    History of Present Illness:  Chely Kramer is a 73 y.o. female who presents for percutaneous abscess aspiration/drainage.  Admission H&P reviewed.  Past Medical History:   Diagnosis Date    Glaucoma      Past Surgical History:   Procedure Laterality Date    ADENOIDECTOMY      APPENDECTOMY  1998    Ruptured    BREAST BIOPSY      BREAST CYST EXCISION      HERNIA REPAIR Right 2003    Ventral    INCISION AND DRAINAGE OF HAND Left 1/22/2019    Procedure: INCISION AND DRAINAGE, HAND (ADD ON );  Surgeon: Sameer Evans MD;  Location: [facility];  Service: Orthopedics;  Laterality: Left;  AXILLARY BLOCK  (ADD ON )    TONSILLECTOMY         Review of Systems:   As documented in primary team H&P    Home Meds:   Prior to Admission medications    Medication Sig Start Date End Date Taking? Authorizing Provider   ascorbic acid, vitamin C, (VITAMIN C) 500 MG tablet Take 2,500 mg by mouth once daily.   Yes Provider, Historical   brimonidine 0.2% (ALPHAGAN) 0.2 % Drop Place 1 drop into both eyes 2 (two) times daily. 1/11/24  Yes Provider, Historical   dicyclomine (BENTYL) 20 mg tablet Take 1 tablet (20 mg total) by mouth 3 (three) times daily as needed (abdominal cramping). 2/15/24 3/16/24 Yes KIA Herrera MD   latanoprost 0.005 % ophthalmic solution Place 1 drop into both eyes every evening. 1/11/24  Yes Provider, Historical   pantoprazole (PROTONIX) 40 MG tablet Take 1 tablet (40 mg total) by mouth once daily. 2/15/24 2/14/25 Yes KIA Herrera MD   traZODone (DESYREL) 50 MG tablet Take 1/2-1 tablet nightly as needed for insomnia. 10/24/23  Yes KIA Herrera MD   loteprednol (ALREX) 0.2 % DrpS 1 drop 4 (four) times daily.    Provider, Historical     Scheduled Meds:    brimonidine 0.15 % OPTH DROP  1 drop Both Eyes BID    latanoprost  1 drop Both Eyes QHS    pantoprazole  40 mg Oral Daily    piperacillin-tazobactam (Zosyn) IV (PEDS and ADULTS) (extended infusion is not appropriate)  4.5  g Intravenous Q8H     Continuous Infusions:   PRN Meds:acetaminophen, acetaminophen, albuterol-ipratropium, aluminum-magnesium hydroxide-simethicone, dextrose 10%, dextrose 10%, dicyclomine, glucagon (human recombinant), glucose, glucose, HYDROcodone-acetaminophen, melatonin, naloxone, ondansetron, oxybutynin, prochlorperazine, simethicone, sodium chloride 0.9%, traZODone  Anticoagulants/Antiplatelets: no anticoagulation    Allergies:   Review of patient's allergies indicates:   Allergen Reactions    Ciprofloxacin (bulk) Hives    Flagyl [metronidazole] Hives     Sedation Hx: have not been any systemic reactions    Labs:  Recent Labs   Lab 02/22/24 0605   INR 1.1       Recent Labs   Lab 02/22/24 0605   WBC 10.58   HGB 11.8*   HCT 35.9*   MCV 95         Recent Labs   Lab 02/22/24 0605         K 3.8      CO2 25   BUN 10   CREATININE 0.8   CALCIUM 9.5   ALT 12   AST 34   ALBUMIN 3.0*   BILITOT 0.3         Vitals:  Temp: 99.1 °F (37.3 °C) (02/22/24 0811)  Pulse: 99 (02/22/24 0910)  Resp: (!) 39 (02/22/24 0910)  BP: 130/69 (02/22/24 0910)  SpO2: 96 % (02/22/24 0910)     Physical Exam:  ASA: 2  Mallampati: 3    General: no acute distress  Mental Status: alert and oriented to person, place and time  HEENT: normocephalic, atraumatic  Chest: unlabored breathing  Heart: regular heart rate  Abdomen: nondistended, c/o lower abdominal pain  Extremity: moves all extremities    Plan: proceed with abscess aspiration/drainage  Sedation Plan: moderate    Fernando Thurman MD  Staff Radiologist  Department of Radiology  Pager: [phone]

## 2024-02-23 LAB
ALBUMIN SERPL BCP-MCNC: 3 G/DL (ref 3.5–5.2)
ALP SERPL-CCNC: 74 U/L (ref 55–135)
ALT SERPL W/O P-5'-P-CCNC: 11 U/L (ref 10–44)
ANION GAP SERPL CALC-SCNC: 15 MMOL/L (ref 8–16)
AST SERPL-CCNC: 14 U/L (ref 10–40)
BASOPHILS # BLD AUTO: 0.03 K/UL (ref 0–0.2)
BASOPHILS NFR BLD: 0.3 % (ref 0–1.9)
BILIRUB SERPL-MCNC: 0.3 MG/DL (ref 0.1–1)
BUN SERPL-MCNC: 13 MG/DL (ref 8–23)
CALCIUM SERPL-MCNC: 9.1 MG/DL (ref 8.7–10.5)
CHLORIDE SERPL-SCNC: 99 MMOL/L (ref 95–110)
CO2 SERPL-SCNC: 23 MMOL/L (ref 23–29)
CREAT SERPL-MCNC: 0.7 MG/DL (ref 0.5–1.4)
DIFFERENTIAL METHOD BLD: ABNORMAL
EOSINOPHIL # BLD AUTO: 0.1 K/UL (ref 0–0.5)
EOSINOPHIL NFR BLD: 1.3 % (ref 0–8)
ERYTHROCYTE [DISTWIDTH] IN BLOOD BY AUTOMATED COUNT: 12.1 % (ref 11.5–14.5)
EST. GFR  (NO RACE VARIABLE): >60 ML/MIN/1.73 M^2
GLUCOSE SERPL-MCNC: 75 MG/DL (ref 70–110)
HCT VFR BLD AUTO: 37 % (ref 37–48.5)
HGB BLD-MCNC: 12.2 G/DL (ref 12–16)
IMM GRANULOCYTES # BLD AUTO: 0.02 K/UL (ref 0–0.04)
IMM GRANULOCYTES NFR BLD AUTO: 0.2 % (ref 0–0.5)
LYMPHOCYTES # BLD AUTO: 1.8 K/UL (ref 1–4.8)
LYMPHOCYTES NFR BLD: 19.2 % (ref 18–48)
MCH RBC QN AUTO: 31 PG (ref 27–31)
MCHC RBC AUTO-ENTMCNC: 33 G/DL (ref 32–36)
MCV RBC AUTO: 94 FL (ref 82–98)
MONOCYTES # BLD AUTO: 0.8 K/UL (ref 0.3–1)
MONOCYTES NFR BLD: 8.4 % (ref 4–15)
NEUTROPHILS # BLD AUTO: 6.4 K/UL (ref 1.8–7.7)
NEUTROPHILS NFR BLD: 70.6 % (ref 38–73)
NRBC BLD-RTO: 0 /100 WBC
PLATELET # BLD AUTO: 350 K/UL (ref 150–450)
PMV BLD AUTO: 8.3 FL (ref 9.2–12.9)
POTASSIUM SERPL-SCNC: 3.7 MMOL/L (ref 3.5–5.1)
PROT SERPL-MCNC: 6.7 G/DL (ref 6–8.4)
RBC # BLD AUTO: 3.94 M/UL (ref 4–5.4)
SODIUM SERPL-SCNC: 137 MMOL/L (ref 136–145)
WBC # BLD AUTO: 9.1 K/UL (ref 3.9–12.7)

## 2024-02-23 PROCEDURE — 36415 COLL VENOUS BLD VENIPUNCTURE: CPT | Mod: HCNC | Performed by: PHYSICIAN ASSISTANT

## 2024-02-23 PROCEDURE — 94761 N-INVAS EAR/PLS OXIMETRY MLT: CPT | Mod: HCNC

## 2024-02-23 PROCEDURE — 25000003 PHARM REV CODE 250: Mod: HCNC | Performed by: NURSE PRACTITIONER

## 2024-02-23 PROCEDURE — 80053 COMPREHEN METABOLIC PANEL: CPT | Mod: HCNC | Performed by: PHYSICIAN ASSISTANT

## 2024-02-23 PROCEDURE — 85025 COMPLETE CBC W/AUTO DIFF WBC: CPT | Mod: HCNC | Performed by: PHYSICIAN ASSISTANT

## 2024-02-23 PROCEDURE — 99231 SBSQ HOSP IP/OBS SF/LOW 25: CPT | Mod: HCNC,,, | Performed by: SPECIALIST

## 2024-02-23 PROCEDURE — 25000003 PHARM REV CODE 250: Mod: HCNC | Performed by: PHYSICIAN ASSISTANT

## 2024-02-23 PROCEDURE — 63600175 PHARM REV CODE 636 W HCPCS: Mod: HCNC | Performed by: PHYSICIAN ASSISTANT

## 2024-02-23 PROCEDURE — 11000001 HC ACUTE MED/SURG PRIVATE ROOM: Mod: HCNC

## 2024-02-23 RX ADMIN — PANTOPRAZOLE SODIUM 40 MG: 40 TABLET, DELAYED RELEASE ORAL at 08:02

## 2024-02-23 RX ADMIN — OXYBUTYNIN CHLORIDE 5 MG: 5 TABLET ORAL at 09:02

## 2024-02-23 RX ADMIN — TRAZODONE HYDROCHLORIDE 50 MG: 50 TABLET ORAL at 09:02

## 2024-02-23 RX ADMIN — BRIMONIDINE TARTRATE 1 DROP: 1.5 SOLUTION/ DROPS OPHTHALMIC at 09:02

## 2024-02-23 RX ADMIN — BRIMONIDINE TARTRATE 1 DROP: 1.5 SOLUTION/ DROPS OPHTHALMIC at 08:02

## 2024-02-23 RX ADMIN — PIPERACILLIN SODIUM AND TAZOBACTAM SODIUM 4.5 G: 4; .5 INJECTION, POWDER, LYOPHILIZED, FOR SOLUTION INTRAVENOUS at 10:02

## 2024-02-23 RX ADMIN — ACETAMINOPHEN 1000 MG: 500 TABLET ORAL at 02:02

## 2024-02-23 RX ADMIN — LATANOPROST 1 DROP: 50 SOLUTION/ DROPS OPHTHALMIC at 09:02

## 2024-02-23 RX ADMIN — PIPERACILLIN SODIUM AND TAZOBACTAM SODIUM 4.5 G: 4; .5 INJECTION, POWDER, LYOPHILIZED, FOR SOLUTION INTRAVENOUS at 02:02

## 2024-02-23 RX ADMIN — PIPERACILLIN SODIUM AND TAZOBACTAM SODIUM 4.5 G: 4; .5 INJECTION, POWDER, LYOPHILIZED, FOR SOLUTION INTRAVENOUS at 05:02

## 2024-02-23 RX ADMIN — ACETAMINOPHEN 1000 MG: 500 TABLET ORAL at 04:02

## 2024-02-23 NOTE — CONSULTS
[facility] ([address])  Adult Nutrition  Consult Note    SUMMARY     Recommendations  1) If patient is to remain NPO, rec initiation of PPN Clinimix-E 4.25/5 @ 75 mL/hr with daily IVFE to provide 1800 total volume, 77 g AA, 90 g D, 1112 kcal, GIR 0.9. Monitor triglycerides and electrolytes while on PPN.    2) Adv diet as medically able to liquid diet, adv as tolerated   3) Monitor weights, labs    4) RD to monitor and follow up    Goals:   Pt will have a means to nutrition by RD follow up  Nutrition Goal Status: new  Communication of RD Recs:  (POC)    Assessment and Plan    Endocrine  Moderate malnutrition  Malnutrition Type:  Context: chronic illness  Level: moderate    Related to (etiology):   Altered GI function      Signs and Symptoms (as evidenced by):   Weight loss  Inability to tolerate PO x 3 weeks  Abdominal pain    Malnutrition Characteristic Summary:  Weight Loss (Malnutrition): greater than 5% in 1 month (6.8%)  Energy Intake (Malnutrition): less than 75% for greater than or equal to 1 month  Subcutaneous Fat (Malnutrition): mild depletion  Muscle Mass (Malnutrition): mild depletion      Interventions (treatment strategy):  Collaboration with other providers    Nutrition Diagnosis Status:   New         Malnutrition Assessment  Malnutrition Context: chronic illness  Malnutrition Level: moderate          Weight Loss (Malnutrition): greater than 5% in 1 month (6.8%)  Energy Intake (Malnutrition): less than 75% for greater than or equal to 1 month  Subcutaneous Fat (Malnutrition): mild depletion  Muscle Mass (Malnutrition): mild depletion   Orbital Region (Subcutaneous Fat Loss): mild depletion  Upper Arm Region (Subcutaneous Fat Loss): moderate depletion   [place] Region (Muscle Loss): mild depletion  Patellar Region (Muscle Loss): mild depletion                 Reason for Assessment    Reason For Assessment: consult (minimum intake)  Diagnosis:  (Diverticulitis of large intestine with abscess  "without bleeding)  Relevant Medical History:   Patient Active Problem List   Diagnosis    Primary open angle glaucoma (POAG) of left eye, indeterminate stage    History of colonic diverticulitis    Known medical problems    Hx of basal cell carcinoma    Hepatitis C antibody positive in blood    Obesity (BMI 30.0-34.9)    Blood type O-    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 No nausea, vomiting or abdominal pain at time of visit. PIV. Joey 21 - L arm, drain to buttock. NFPE completed pt meet criteria for moderate malnutrition r/t inadequate oral intake aeb 6.8% weight loss in 1 month, mild depletion of fat loss and muscle mass, inability to tolerate PO > 3 weeks.  Nutrition Discharge Planning: pending medical course    Nutrition Risk Screen    Nutrition Risk Screen: reduced oral intake over the last month, unintentional loss of 10 lbs or more in the past 2 months    Nutrition/Diet History    Food Allergies: NKFA  Factors Affecting Nutritional Intake: abdominal distension, altered gastrointestinal function, decreased appetite    Anthropometrics    Temp: 98 °F (36.7 °C)  Height Method: Stated  Height: 5' 1" (154.9 cm)  Height (inches): 61 in  Weight Method: Bed Scale  Weight: 69.4 kg (153 lb)  Weight (lb): 153 lb  Ideal Body Weight (IBW), Female: 105 lb  % Ideal Body Weight, Female (lb): 145.71 %  BMI (Calculated): 28.9  BMI Grade: 25 - 29.9 - overweight  Weight Loss: unintentional  Usual Body Weight (UBW), k.5 kg (2024)  % Usual Body Weight: 93.35  % Weight Change From Usual Weight: -6.85 %       Lab/Procedures/Meds    Pertinent Labs " Reviewed: reviewed  CBC:  Recent Labs   Lab 02/22/24  0605   WBC 10.58   HGB 11.8*   HCT 35.9*        CMP:  Recent Labs   Lab 02/22/24  0605   CALCIUM 9.5   ALBUMIN 3.0*   PROT 6.6      K 3.8   CO2 25      BUN 10   CREATININE 0.8   ALKPHOS 77   ALT 12   AST 34   BILITOT 0.3     BMP:   Recent Labs   Lab 02/22/24  0605         K 3.8      CO2 25   BUN 10   CREATININE 0.8   CALCIUM 9.5       Pertinent Medications Reviewed: reviewed  Scheduled Meds:   brimonidine 0.15 % OPTH DROP  1 drop Both Eyes BID    latanoprost  1 drop Both Eyes QHS    pantoprazole  40 mg Oral Daily    piperacillin-tazobactam (Zosyn) IV (PEDS and ADULTS) (extended infusion is not appropriate)  4.5 g Intravenous Q8H     Continuous Infusions:  PRN Meds:.acetaminophen, acetaminophen, albuterol-ipratropium, aluminum-magnesium hydroxide-simethicone, dextrose 10%, dextrose 10%, dicyclomine, glucagon (human recombinant), glucose, glucose, HYDROcodone-acetaminophen, melatonin, naloxone, ondansetron, oxybutynin, prochlorperazine, simethicone, sodium chloride 0.9%, traZODone    Estimated/Assessed Needs    Weight Used For Calorie Calculations: 69.4 kg (153 lb)  Energy Calorie Requirements (kcal): 8643-1613 (25-30)  Energy Need Method: Kcal/kg  Protein Requirements: 70 g (1 g/kg)  Weight Used For Protein Calculations: 69.4 kg (153 lb)  Fluid Requirements (mL): 1 mL/kcal  Estimated Fluid Requirement Method:  (or per MD)  RDA Method (mL): 1735     Wt Readings from Last 5 Encounters:   02/22/24 69.4 kg (153 lb)   02/15/24 72.6 kg (160 lb)   01/18/24 74.4 kg (164 lb)   10/24/23 73.9 kg (163 lb)   01/10/23 72.6 kg (160 lb)       Nutrition Prescription Ordered    Current Diet Order: NPO    Evaluation of Received Nutrient/Fluid Intake    I/O: - 220 mL since admit  Energy Calories Required: not meeting needs  Protein Required: not meeting needs  Fluid Required: not meeting needs  Comments: LBM: 2/21/24  Tolerance:  (NPO)  % Intake  of Estimated Energy Needs: 0 - 25 %  % Meal Intake: NPO    Intake/Output - Last 3 Shifts         02/20 0700 02/21 0659 02/21 0700 02/22 0659 02/22 0700 02/23 0659    P.O.   0    I.V. (mL/kg)   0 (0)    Other   10    IV Piggyback  100     Total Intake(mL/kg)  100 (1.4) 10 (0.1)    Urine (mL/kg/hr)  300 0 (0)    Emesis/NG output   0    Drains   30    Other   0    Stool  0 0    Total Output  300 30    Net  -200 -20           Urine Occurrence   0 x    Stool Occurrence  1 x 0 x    Emesis Occurrence   0 x            Nutrition Risk    Level of Risk/Frequency of Follow-up:  (1-2 x/week)       Monitor and Evaluation    Food and Nutrient Intake: energy intake, food and beverage intake, parenteral nutrition intake  Food and Nutrient Adminstration: diet order, enteral and parenteral nutrition administration  Physical Activity and Function: nutrition-related ADLs and IADLs  Anthropometric Measurements: weight, weight change  Biochemical Data, Medical Tests and Procedures: electrolyte and renal panel, gastrointestinal profile, glucose/endocrine profile, inflammatory profile, lipid profile  Nutrition-Focused Physical Findings: overall appearance       Nutrition Follow-Up    RD Follow-up?: Yes    Megha Broderick RDN, [facility]

## 2024-02-23 NOTE — PROGRESS NOTES
House day 2.   Diagnosis-diverticulitis with pelvic abscess, status post percutaneous drainage    Subjective   Difficulty urinating and constipated    PE   Afebrile   Vital signs stable   Chest-clear   Heart-regular rate and rhythm   Abdomen-soft, tenderness to deep palpation lower abdomen, no distention, positive bowel sounds, extremities-no tenderness , drain in place    Imaging   CT shows diverticular abscess, status post IR drainage today of fecal appearing material    Impression/plan   Diverticulitis with perforation and abscess   Continue antibiotics   Okay to begin clear liquids

## 2024-02-23 NOTE — PROGRESS NOTES
House day 3.  Diagnosis- perforated diverticulitis with pelvic abscess, status post percutaneous drainage    Subjective   Patient complain of urinary frequency    PE  Afebrile   Vital signs stable   WD/WN female NAD  HEENT -anicteric, extraocular movements intact, atraumatic   Neck-trachea midline   Chest-clear   Heart-regular rate and rhythm   Abdomen- soft, nondistended, positive bowel sounds   Extremities- no tenderness     Lab-   White blood cell count -within normal limits     Imaging   Bladder ultrasound shows no distention     Plan   Follow-up CT to evaluate status of abscess, suspect bladder spasms secondary to adjacent abscess.

## 2024-02-23 NOTE — PLAN OF CARE
Recommendations  1) If patient is to remain NPO, rec initiation of PPN Clinimix-E 4.25/5 @ 75 mL/hr with daily IVFE to provide 1800 total volume, 77 g AA, 90 g D, 1112 kcal, GIR 0.9. Monitor triglycerides and electrolytes while on PPN.    2) Adv diet as medically able to liquid diet, adv as tolerated   3) Monitor weights, labs    4) RD to monitor and follow up    Goals:   Pt will have a means to nutrition by RD follow up  Nutrition Goal Status: new  Communication of RD Recs:  (POC)

## 2024-02-23 NOTE — PLAN OF CARE
Resting comfortably in bed at this time.  VSS on RA and afebrile this shift.  Tolerating pain on prns - PO.      Diet requested.      Repositions self independently in bed and ambulating around room.    Drain care and flush provided.    UOP minimal with urgency and frequency.    Free from injury or skin breakdown; Fall precautions maintained and call light in reach.  POC updated questions answered and comments acknowledged.  Purposeful hourly rounding completed this shift.

## 2024-02-23 NOTE — ASSESSMENT & PLAN NOTE
Malnutrition Type:  Context: chronic illness  Level: moderate    Related to (etiology):   Altered GI function      Signs and Symptoms (as evidenced by):   Weight loss  Inability to tolerate PO x 3 weeks  Abdominal pain    Malnutrition Characteristic Summary:  Weight Loss (Malnutrition): greater than 5% in 1 month (6.8%)  Energy Intake (Malnutrition): less than 75% for greater than or equal to 1 month  Subcutaneous Fat (Malnutrition): mild depletion  Muscle Mass (Malnutrition): mild depletion      Interventions/Recommendations (treatment strategy):  1) If patient is to remain NPO, rec initiation of PPN Clinimix-E 4.25/5 @ 75 mL/hr with daily IVFE to provide 1800 total volume, 77 g AA, 90 g D, 1112 kcal, GIR 0.9. Monitor triglycerides and electrolytes while on PPN.  2) Adv diet as medically able to liquid diet, adv as tolerated  3) Monitor weights, labs  4) RD to monitor and follow up    Nutrition Diagnosis Status:   New

## 2024-02-23 NOTE — PLAN OF CARE
5:55 PM  Remains uncomfortable   VSS on RA and afebrile this shift.  Discomfort L flank resolved with APAP not the bladder discomfort Clears tolerated for 25% of bkfst tray only, Refusing other food trays, independent and UO dribbling, Urgent, and frequent constant to BRP.  Repositions self independently in bed and ambulating around room.      Drain care / Flush 10 cc NS     Free from injury or skin breakdown; Fall precautions maintained and call light in reach.  POC updated questions answered and comments acknowledged.  Purposeful hourly rounding completed this shift.

## 2024-02-24 LAB
ALBUMIN SERPL BCP-MCNC: 3.2 G/DL (ref 3.5–5.2)
ALP SERPL-CCNC: 78 U/L (ref 55–135)
ALT SERPL W/O P-5'-P-CCNC: 13 U/L (ref 10–44)
ANION GAP SERPL CALC-SCNC: 12 MMOL/L (ref 8–16)
AST SERPL-CCNC: 15 U/L (ref 10–40)
BASOPHILS # BLD AUTO: 0.03 K/UL (ref 0–0.2)
BASOPHILS NFR BLD: 0.3 % (ref 0–1.9)
BILIRUB SERPL-MCNC: 0.4 MG/DL (ref 0.1–1)
BUN SERPL-MCNC: 8 MG/DL (ref 8–23)
CALCIUM SERPL-MCNC: 9.5 MG/DL (ref 8.7–10.5)
CHLORIDE SERPL-SCNC: 98 MMOL/L (ref 95–110)
CO2 SERPL-SCNC: 26 MMOL/L (ref 23–29)
CREAT SERPL-MCNC: 0.7 MG/DL (ref 0.5–1.4)
DIFFERENTIAL METHOD BLD: ABNORMAL
EOSINOPHIL # BLD AUTO: 0.1 K/UL (ref 0–0.5)
EOSINOPHIL NFR BLD: 1.1 % (ref 0–8)
ERYTHROCYTE [DISTWIDTH] IN BLOOD BY AUTOMATED COUNT: 11.8 % (ref 11.5–14.5)
EST. GFR  (NO RACE VARIABLE): >60 ML/MIN/1.73 M^2
GLUCOSE SERPL-MCNC: 112 MG/DL (ref 70–110)
HCT VFR BLD AUTO: 40.1 % (ref 37–48.5)
HGB BLD-MCNC: 13.1 G/DL (ref 12–16)
IMM GRANULOCYTES # BLD AUTO: 0.04 K/UL (ref 0–0.04)
IMM GRANULOCYTES NFR BLD AUTO: 0.4 % (ref 0–0.5)
LYMPHOCYTES # BLD AUTO: 1.9 K/UL (ref 1–4.8)
LYMPHOCYTES NFR BLD: 17.7 % (ref 18–48)
MCH RBC QN AUTO: 30.3 PG (ref 27–31)
MCHC RBC AUTO-ENTMCNC: 32.7 G/DL (ref 32–36)
MCV RBC AUTO: 93 FL (ref 82–98)
MONOCYTES # BLD AUTO: 0.9 K/UL (ref 0.3–1)
MONOCYTES NFR BLD: 8.7 % (ref 4–15)
NEUTROPHILS # BLD AUTO: 7.6 K/UL (ref 1.8–7.7)
NEUTROPHILS NFR BLD: 71.8 % (ref 38–73)
NRBC BLD-RTO: 0 /100 WBC
PLATELET # BLD AUTO: 397 K/UL (ref 150–450)
PMV BLD AUTO: 8.3 FL (ref 9.2–12.9)
POTASSIUM SERPL-SCNC: 3.4 MMOL/L (ref 3.5–5.1)
PROT SERPL-MCNC: 7.3 G/DL (ref 6–8.4)
RBC # BLD AUTO: 4.33 M/UL (ref 4–5.4)
SODIUM SERPL-SCNC: 136 MMOL/L (ref 136–145)
WBC # BLD AUTO: 10.61 K/UL (ref 3.9–12.7)

## 2024-02-24 PROCEDURE — 11000001 HC ACUTE MED/SURG PRIVATE ROOM: Mod: HCNC

## 2024-02-24 PROCEDURE — 25000003 PHARM REV CODE 250: Mod: HCNC | Performed by: HOSPITALIST

## 2024-02-24 PROCEDURE — 25000003 PHARM REV CODE 250: Mod: HCNC | Performed by: PHYSICIAN ASSISTANT

## 2024-02-24 PROCEDURE — 36415 COLL VENOUS BLD VENIPUNCTURE: CPT | Mod: HCNC | Performed by: PHYSICIAN ASSISTANT

## 2024-02-24 PROCEDURE — 94761 N-INVAS EAR/PLS OXIMETRY MLT: CPT | Mod: HCNC

## 2024-02-24 PROCEDURE — 99221 1ST HOSP IP/OBS SF/LOW 40: CPT | Mod: HCNC,,, | Performed by: STUDENT IN AN ORGANIZED HEALTH CARE EDUCATION/TRAINING PROGRAM

## 2024-02-24 PROCEDURE — 63600175 PHARM REV CODE 636 W HCPCS: Mod: HCNC | Performed by: PHYSICIAN ASSISTANT

## 2024-02-24 PROCEDURE — 85025 COMPLETE CBC W/AUTO DIFF WBC: CPT | Mod: HCNC | Performed by: PHYSICIAN ASSISTANT

## 2024-02-24 PROCEDURE — 80053 COMPREHEN METABOLIC PANEL: CPT | Mod: HCNC | Performed by: PHYSICIAN ASSISTANT

## 2024-02-24 RX ADMIN — PIPERACILLIN SODIUM AND TAZOBACTAM SODIUM 4.5 G: 4; .5 INJECTION, POWDER, LYOPHILIZED, FOR SOLUTION INTRAVENOUS at 10:02

## 2024-02-24 RX ADMIN — PIPERACILLIN SODIUM AND TAZOBACTAM SODIUM 4.5 G: 4; .5 INJECTION, POWDER, LYOPHILIZED, FOR SOLUTION INTRAVENOUS at 05:02

## 2024-02-24 RX ADMIN — POTASSIUM BICARBONATE 40 MEQ: 391 TABLET, EFFERVESCENT ORAL at 09:02

## 2024-02-24 RX ADMIN — TRAZODONE HYDROCHLORIDE 50 MG: 50 TABLET ORAL at 09:02

## 2024-02-24 RX ADMIN — BRIMONIDINE TARTRATE 1 DROP: 1.5 SOLUTION/ DROPS OPHTHALMIC at 09:02

## 2024-02-24 RX ADMIN — PANTOPRAZOLE SODIUM 40 MG: 40 TABLET, DELAYED RELEASE ORAL at 09:02

## 2024-02-24 RX ADMIN — PIPERACILLIN SODIUM AND TAZOBACTAM SODIUM 4.5 G: 4; .5 INJECTION, POWDER, LYOPHILIZED, FOR SOLUTION INTRAVENOUS at 02:02

## 2024-02-24 RX ADMIN — LATANOPROST 1 DROP: 50 SOLUTION/ DROPS OPHTHALMIC at 09:02

## 2024-02-24 RX ADMIN — ACETAMINOPHEN 1000 MG: 500 TABLET ORAL at 04:02

## 2024-02-24 NOTE — PROGRESS NOTES
Patient Name: Chely Kramer  Date: 2/24/2024  Service: Colon and Rectal Surgery    Subjective:  No acute events overnight. Denies abdominal pain. Tolerating CLD without worsening pain or nausea/vomiting. Passing small volume liquid stool with flatus, denies melena/hematochezia. Denies fevers/chills/sweats. Overall feels better with decreased urinary urgency, improved emptying. No other concerns.    Medications:    Current Facility-Administered Medications:     acetaminophen tablet 1,000 mg, 1,000 mg, Oral, Q8H PRN, Rebeka Blackmon, PA-C, 1,000 mg at 02/23/24 1447    acetaminophen tablet 650 mg, 650 mg, Oral, Q4H PRN, Rebeka Blackmon, PA-C, 650 mg at 02/22/24 1158    albuterol-ipratropium 2.5 mg-0.5 mg/3 mL nebulizer solution 3 mL, 3 mL, Nebulization, Q4H PRN, Rebeka Blackmon PA-NITO    aluminum-magnesium hydroxide-simethicone 200-200-20 mg/5 mL suspension 30 mL, 30 mL, Oral, QID PRN, Rebeka Blackmon PA-NITO    brimonidine 0.15 % OPTH DROP ophthalmic solution 1 drop, 1 drop, Both Eyes, BID, Rebeka Blackmon, PA-C, 1 drop at 02/24/24 0919    dextrose 10% bolus 125 mL 125 mL, 12.5 g, Intravenous, PRN, Rebeka Blackmon, PA-C    dextrose 10% bolus 250 mL 250 mL, 25 g, Intravenous, PRN, Rebeka Blackmon, PA-NITO    dicyclomine capsule 20 mg, 20 mg, Oral, TID PRN, Rebeka Blackmon, PA-C, 20 mg at 02/21/24 1637    glucagon (human recombinant) injection 1 mg, 1 mg, Intramuscular, PRN, Rebeka Blackmon, PA-NITO    glucose chewable tablet 16 g, 16 g, Oral, PRN, Rebeka Blackmon, PA-C    glucose chewable tablet 24 g, 24 g, Oral, PRN, Rebeka Blackmon, PA-C    HYDROcodone-acetaminophen  mg per tablet 1 tablet, 1 tablet, Oral, Q6H PRN, Rebeka Blackmon PA-C, 1 tablet at 02/22/24 1917    latanoprost 0.005 % ophthalmic solution 1 drop, 1 drop, Both Eyes, QHS, Rebeka Blackmon PA-C, 1 drop at 02/23/24 2158    melatonin tablet 6 mg, 6 mg, Oral, Nightly PRN, Rebeka Blackmon PA-C, 6 mg at 02/22/24 0918     naloxone 0.4 mg/mL injection 0.02 mg, 0.02 mg, Intravenous, PRN, Rebeka Blackmon, PA-C    ondansetron disintegrating tablet 8 mg, 8 mg, Oral, Q8H PRN, Rebeka Blackmon, PA-C    oxybutynin tablet 5 mg, 5 mg, Oral, TID PRN, Carlos Alberto Lennon NP, 5 mg at 02/23/24 2158    pantoprazole EC tablet 40 mg, 40 mg, Oral, Daily, Rebeka Blackmon, PA-C, 40 mg at 02/24/24 0918    piperacillin-tazobactam (ZOSYN) 4.5 g in dextrose 5 % in water (D5W) 100 mL IVPB (MB+), 4.5 g, Intravenous, Q8H, Rebeka Blackmon PA-C, Stopped at 02/24/24 0911    prochlorperazine injection Soln 5 mg, 5 mg, Intravenous, Q6H PRN, Rebeka Blackmon PA-NITO    simethicone chewable tablet 80 mg, 1 tablet, Oral, QID PRN, Rebeka Blackmon, PA-C, 80 mg at 02/21/24 1637    sodium chloride 0.9% flush 5 mL, 5 mL, Intravenous, PRN, Rebeka Blackmon PA-C    traZODone tablet 50 mg, 50 mg, Oral, Nightly PRN, Rebeka Blackmon, PA-C, 50 mg at 02/23/24 2158    Vital Signs:  Vitals:    02/24/24 0830   BP: (!) 127/59   Pulse: 83   Resp: 18   Temp: 98.5 °F (36.9 °C)       In/Out:  Intake/Output - Last 3 Shifts         02/22 0700 02/23 0659 02/23 0700 02/24 0659 02/24 0700 02/25 0659    P.O. 0 300 240    I.V. (mL/kg) 30 (0.4) 0 (0)     Other 10 10     IV Piggyback 272.1 198.7     Total Intake(mL/kg) 312.1 (4.5) 508.7 (7.3) 240 (3.5)    Urine (mL/kg/hr) 450 (0.3) 950 (0.6) 250 (1.3)    Emesis/NG output 0 0     Drains 55 7.5     Other 0 0     Stool 0 0     Total Output 505 957.5 250    Net -192.9 -448.8 -10           Urine Occurrence 0 x 0 x     Stool Occurrence 0 x 2 x     Emesis Occurrence 0 x 0 x             Physical Exam:  General: Alert, oriented, in no apparent distress  HEENT: Sclera anicteric, trachea midline  Lungs: Normal respiratory rate and effort on room air  Abdomen: Soft, minimal LLQ/suprapubic TTP (mostly notes pressure and urge to urinate), otherwise nontender, nondistended. Well-healed midline scars without palpable incisional hernia.  "Transgluteal IR drain with small volume thin serosanguinous output.  Extremities: Warm, well perfused, no edema  Neuro: Grossly intact, moves all extremities  Psych: Appropriate affect    Laboratory Studies:  Complete Blood Count:  Recent Labs   Lab 02/22/24  0605 02/23/24  0531 02/24/24  0502   WBC 10.58 9.10 10.61   RBC 3.80* 3.94* 4.33   HGB 11.8* 12.2 13.1   HCT 35.9* 37.0 40.1    350 397       CRP:  No results for input(s): "CRP" in the last 72 hours.    Basic Chemistry Panel:  Recent Labs   Lab 02/24/24  0502      K 3.4*   CL 98   CO2 26   BUN 8   CREATININE 0.7   CALCIUM 9.5     Hepatic Panel:  Recent Labs   Lab 02/24/24  0502   AST 15   ALT 13   ALKPHOS 78   BILITOT 0.4   ALBUMIN 3.2*     Nutrition Labs:  Recent Labs   Lab 02/24/24  0502   ALBUMIN 3.2*     Coagulation Panel:  No results for input(s): "PT", "INR", "PTT" in the last 24 hours.    Imaging Studies:  CT Abd/Pelv wo Contrast (2/23/24, personally reviewed):  Apparent resolution of the previously identified deep pelvic abscess with percutaneous drain in place.     Ongoing sigmoid diverticulitis.  There is inflammatory stranding in the dependent pelvis but no additional drainable fluid collection.    Assessment:  Chely Kramer is a 73 y.o. year old female with history of glaucoma and diverticulitis who presents with symptoms of urinary retention, found to have likely complicated diverticulitis with abscess s/p IR drainage 2/22/24 with symptomatic improvement and radiographic improvement on repeat noncon CT 2/23/24, doing well.    Plan:  - Advance to [facility] (ordered)  - IR cultures pending  - Continue Zosyn in setting of persistent (though significantly improved) stranding/inflammation on repeat CT with plan to transition to Augmentin at time of discharge to complete 10 day total course  - Continue IR drain to bulb suction with plans for outpatient removal given amount of persistent inflammation on scan and risk for abscess reaccumulation  - " Potential discharge in AM if patient tolerates diet without worsening leukocytosis, fever or symptoms  - Rest of care per primary    Boyd An MD  Staff Surgeon

## 2024-02-24 NOTE — SUBJECTIVE & OBJECTIVE
Interval History:  No acute events.  Patient reports continued urgency and frequency with urination and incomplete voiding sensation that persists, but overall better.    Review of Systems   Constitutional:  Negative for chills and fever.   Respiratory:  Negative for shortness of breath.    Genitourinary:  Positive for decreased urine volume, dysuria, frequency and urgency.     Objective:     Vital Signs (Most Recent):  Temp: 97.8 °F (36.6 °C) (02/23/24 1924)  Pulse: 77 (02/23/24 1924)  Resp: 18 (02/23/24 1924)  BP: (!) 140/70 (02/23/24 1924)  SpO2: 95 % (02/23/24 1924) Vital Signs (24h Range):  Temp:  [97.6 °F (36.4 °C)-98.6 °F (37 °C)] 97.8 °F (36.6 °C)  Pulse:  [63-92] 77  Resp:  [17-20] 18  SpO2:  [94 %-97 %] 95 %  BP: (107-149)/(54-70) 140/70     Weight: 69.4 kg (153 lb)  Body mass index is 28.91 kg/m².    Intake/Output Summary (Last 24 hours) at 2/23/2024 2242  Last data filed at 2/23/2024 1447  Gross per 24 hour   Intake 328.7 ml   Output 722.5 ml   Net -393.8 ml           Physical Exam  Constitutional:       General: She is not in acute distress.     Appearance: Normal appearance. She is not ill-appearing.   HENT:      Head: Normocephalic and atraumatic.      Nose: Nose normal.   Eyes:      Extraocular Movements: Extraocular movements intact.      Conjunctiva/sclera: Conjunctivae normal.   Cardiovascular:      Rate and Rhythm: Normal rate and regular rhythm.   Pulmonary:      Effort: Pulmonary effort is normal. No respiratory distress.   Abdominal:      General: Abdomen is flat. Bowel sounds are normal. There is no distension.      Palpations: Abdomen is soft.      Tenderness: There is no abdominal tenderness. There is no guarding or rebound.   Genitourinary:     Comments: Drain left in place connected to TANYA rising from posterior lateral flank  Musculoskeletal:         General: Normal range of motion.      Cervical back: Normal range of motion.   Skin:     General: Skin is warm.   Neurological:      Mental  Status: She is alert and oriented to person, place, and time.   Psychiatric:         Mood and Affect: Mood normal.         Behavior: Behavior normal.         Thought Content: Thought content normal.             Significant Labs: All pertinent labs within the past 24 hours have been reviewed.    Significant Imaging: I have reviewed all pertinent imaging results/findings within the past 24 hours.

## 2024-02-24 NOTE — ASSESSMENT & PLAN NOTE
- Likely spasm but further workup with repeat CT done  - Ditropan PRN for spasm  - Symptomatically much improved, possible irritation of bladder from diverticulitis/infection

## 2024-02-24 NOTE — ASSESSMENT & PLAN NOTE
- Diagnosed with diverticulitis at the end of January, prescribed Cipro/Flagyl but developed a rash and was switched to Augmentin; CT AP on admission revealed acute on chronic diverticulitis with deep pelvic abscess. 1/4 SIRS for tachycardia on admission  - Continue IV Zosyn  - Surgery consulted, appreciate input  - IR consulted, drainage of abscess with drain in place on 2/22  - Continue pain control and supportive care  - Follow up on cultures

## 2024-02-24 NOTE — ASSESSMENT & PLAN NOTE
- Initially presented for difficulty urinating, UA pristine  - CT AP reveals left renal mass worrisome for renal cell carcinoma, discussed with patient  - Urology consulted, appreciate input.  Concerning for renal carcinoma. Plan for outpatient follow up with Dr Giles,  onc, to discuss renal mass excision   - Continue to trend with labs

## 2024-02-24 NOTE — PLAN OF CARE
Plan of care reviewed with patient. Pt encourage increased in po intake and fluids. Safety maintained. Patient voiding without difficulty, 2 loose BM this shift. Safety maintained.     Problem: Adult Inpatient Plan of Care  Goal: Plan of Care Review  Outcome: Ongoing, Progressing  Goal: Absence of Hospital-Acquired Illness or Injury  Outcome: Ongoing, Progressing  Goal: Optimal Comfort and Wellbeing  Outcome: Ongoing, Progressing  Goal: Readiness for Transition of Care  Outcome: Ongoing, Progressing     Problem: Infection  Goal: Absence of Infection Signs and Symptoms  Outcome: Ongoing, Progressing     Problem: Fall Injury Risk  Goal: Absence of Fall and Fall-Related Injury  Outcome: Ongoing, Progressing     Problem: Oral Intake Inadequate  Goal: Improved Oral Intake  Outcome: Ongoing, Progressing

## 2024-02-24 NOTE — PROGRESS NOTES
"[facility] Surg [address] Medicine  Progress Note    Patient Name: Chely Kramer  MRN: [id number]  Patient Class: IP- Inpatient   Admission Date: 2/21/2024  Length of Stay: 2 days  Attending Physician: Judith Castro MD  Primary Care Provider: KIA Herrera MD        Subjective:     Principal Problem:Diverticulitis of large intestine with abscess without bleeding        HPI:  Chely Kramer is a pleasant 73F with history of diverticulitis, glaucoma who presents for difficulty urinating for several days. Every time she goes to urinate she feels as though she is not emptying her bladder completely. She feels like she is "running to the bathroom every ten minutes". If she does not go to urinate when she has the urge then she experiences lower abdominal pain.  No pain or discomfort with urination, no blood in urine. Denies fever, nausea, vomiting, diarrhea. At the end of January she saw her PCP for abdominal pain and was treated with Cipro/Flagyl for diverticulitis, however she developed a rash and was switched to Augmentin. She completed a 7 day course, but says her symptoms persisted. Her last BM was this morning, she does note that her stool is different consistency than usual, describes it as "mandeep"    In ER: afebrile without leukocytosis, mildly tachycardic ; CMP unremarkable, UA unremarkable, CT AP shows findings worrisome for acute on chronic diverticulitis of sigmoid colon with focal abscess in deep pelvis. 4.3cm left renal mass worrisome for renal cell carcinoma. ER discussed case with surgery who recommend IR consult, plan for drainage tomorrow. Urology also consulted for renal mass. Started on IV Zosyn    Overview/Hospital Course:  Ms. Kramer presented with dysuria.  Admitted with diverticulitis with associated abscess and noted renal mass on imaging.  Empirically started on Zosyn.  General surgery, urology and IR consulted.    Interval History:  No acute events.  Patient reports " continued urgency and frequency with urination and incomplete voiding sensation that persists, but overall better.    Review of Systems   Constitutional:  Negative for chills and fever.   Respiratory:  Negative for shortness of breath.    Genitourinary:  Positive for decreased urine volume, dysuria, frequency and urgency.     Objective:     Vital Signs (Most Recent):  Temp: 97.8 °F (36.6 °C) (02/23/24 1924)  Pulse: 77 (02/23/24 1924)  Resp: 18 (02/23/24 1924)  BP: (!) 140/70 (02/23/24 1924)  SpO2: 95 % (02/23/24 1924) Vital Signs (24h Range):  Temp:  [97.6 °F (36.4 °C)-98.6 °F (37 °C)] 97.8 °F (36.6 °C)  Pulse:  [63-92] 77  Resp:  [17-20] 18  SpO2:  [94 %-97 %] 95 %  BP: (107-149)/(54-70) 140/70     Weight: 69.4 kg (153 lb)  Body mass index is 28.91 kg/m².    Intake/Output Summary (Last 24 hours) at 2/23/2024 2242  Last data filed at 2/23/2024 1447  Gross per 24 hour   Intake 328.7 ml   Output 722.5 ml   Net -393.8 ml           Physical Exam  Constitutional:       General: She is not in acute distress.     Appearance: Normal appearance. She is not ill-appearing.   HENT:      Head: Normocephalic and atraumatic.      Nose: Nose normal.   Eyes:      Extraocular Movements: Extraocular movements intact.      Conjunctiva/sclera: Conjunctivae normal.   Cardiovascular:      Rate and Rhythm: Normal rate and regular rhythm.   Pulmonary:      Effort: Pulmonary effort is normal. No respiratory distress.   Abdominal:      General: Abdomen is flat. Bowel sounds are normal. There is no distension.      Palpations: Abdomen is soft.      Tenderness: There is no abdominal tenderness. There is no guarding or rebound.   Genitourinary:     Comments: Drain left in place connected to TANYA rising from posterior lateral flank  Musculoskeletal:         General: Normal range of motion.      Cervical back: Normal range of motion.   Skin:     General: Skin is warm.   Neurological:      Mental Status: She is alert and oriented to person, place,  and time.   Psychiatric:         Mood and Affect: Mood normal.         Behavior: Behavior normal.         Thought Content: Thought content normal.             Significant Labs: All pertinent labs within the past 24 hours have been reviewed.    Significant Imaging: I have reviewed all pertinent imaging results/findings within the past 24 hours.    Assessment/Plan:      * Diverticulitis of large intestine with abscess without bleeding  - Diagnosed with diverticulitis at the end of January, prescribed Cipro/Flagyl but developed a rash and was switched to Augmentin; CT AP on admission revealed acute on chronic diverticulitis with deep pelvic abscess. 1/4 SIRS for tachycardia on admission  - Continue IV Zosyn  - Surgery consulted, appreciate input  - IR consulted, drainage of abscess with drain in place on 2/22  - Continue pain control and supportive care  - Follow up on cultures    Frequency of urination  - Likely spasm but further workup with repeat CT done  - Ditropan PRN for spasm    Renal mass  - Initially presented for difficulty urinating, UA pristine  - CT AP reveals left renal mass worrisome for renal cell carcinoma, discussed with patient  - Urology consulted, appreciate input.  Concerning for renal carcinoma. Likely plan for outpatient follow up with Dr Giles,  onc, to discuss renal mass excision   - Continue to trend with labs      VTE Risk Mitigation (From admission, onward)           Ordered     IP VTE HIGH RISK PATIENT  Once         02/21/24 1450                        Judith Castro MD  Department of Hospital Medicine   [facility] ([address])

## 2024-02-24 NOTE — ASSESSMENT & PLAN NOTE
- Diagnosed with diverticulitis at the end of January, prescribed Cipro/Flagyl but developed a rash and was switched to Augmentin; CT AP on admission revealed acute on chronic diverticulitis with deep pelvic abscess. 1/4 SIRS for tachycardia on admission  - Continue IV Zosyn  - Surgery consulted, appreciate input  - IR consulted, drainage of abscess with drain in place on 2/22  - Repeat CT scan of the abdomen pelvis showed ongoing sigmoid diverticulitis and apparent resolution of the previously identified deep pelvic access with percutaneous drain in place on 02/23  - Clinically much improved today, improved urine output and near resolution of symptoms   - Advance diet as per General surgery, plan to increase to low residue today  - Continue pain control and supportive care  - Follow up on cultures  - Possible discharge in 1-2 days if patient continues to do well and after clearance from General surgery

## 2024-02-24 NOTE — PROGRESS NOTES
"[facility] - [facility] Surg [address] Medicine  Progress Note    Patient Name: Chely Kramer  MRN: [id number]  Patient Class: IP- Inpatient   Admission Date: 2/21/2024  Length of Stay: 3 days  Attending Physician: Judith Castro MD  Primary Care Provider: KIA Herrera MD        Subjective:     Principal Problem:Diverticulitis of large intestine with abscess without bleeding        HPI:  Chely Kramer is a pleasant 73F with history of diverticulitis, glaucoma who presents for difficulty urinating for several days. Every time she goes to urinate she feels as though she is not emptying her bladder completely. She feels like she is "running to the bathroom every ten minutes". If she does not go to urinate when she has the urge then she experiences lower abdominal pain.  No pain or discomfort with urination, no blood in urine. Denies fever, nausea, vomiting, diarrhea. At the end of January she saw her PCP for abdominal pain and was treated with Cipro/Flagyl for diverticulitis, however she developed a rash and was switched to Augmentin. She completed a 7 day course, but says her symptoms persisted. Her last BM was this morning, she does note that her stool is different consistency than usual, describes it as "mandeep"    In ER: afebrile without leukocytosis, mildly tachycardic ; CMP unremarkable, UA unremarkable, CT AP shows findings worrisome for acute on chronic diverticulitis of sigmoid colon with focal abscess in deep pelvis. 4.3cm left renal mass worrisome for renal cell carcinoma. ER discussed case with surgery who recommend IR consult, plan for drainage tomorrow. Urology also consulted for renal mass. Started on IV Zosyn    Overview/Hospital Course:  Ms. Kramer presented with dysuria.  Admitted with diverticulitis with associated abscess and noted renal mass on imaging.  Empirically started on Zosyn.  General surgery, urology and IR consulted.  Drainage of abscess with drain in place on " 2/22.    Interval History:  No acute events.  Patient reports feeling much better today, less urgency and frequency with urination, and voided 950 mL overnight.  Making more urine, overall feeling better.  Minimal pain in the belly, which she describes as soreness.    Review of Systems   Constitutional:  Negative for chills and fever.   Respiratory:  Negative for shortness of breath.    Genitourinary:  Positive for decreased urine volume, dysuria, frequency and urgency.     Objective:     Vital Signs (Most Recent):  Temp: 98.5 °F (36.9 °C) (02/24/24 0830)  Pulse: 83 (02/24/24 0830)  Resp: 18 (02/24/24 0830)  BP: (!) 127/59 (02/24/24 0830)  SpO2: 95 % (02/24/24 0830) Vital Signs (24h Range):  Temp:  [97.8 °F (36.6 °C)-98.7 °F (37.1 °C)] 98.5 °F (36.9 °C)  Pulse:  [64-83] 83  Resp:  [18] 18  SpO2:  [94 %-98 %] 95 %  BP: (113-148)/(56-71) 127/59     Weight: 69.4 kg (153 lb)  Body mass index is 28.91 kg/m².    Intake/Output Summary (Last 24 hours) at 2/24/2024 0941  Last data filed at 2/24/2024 0920  Gross per 24 hour   Intake 688.7 ml   Output 1107.5 ml   Net -418.8 ml           Physical Exam  Constitutional:       General: She is not in acute distress.     Appearance: Normal appearance. She is not ill-appearing.   HENT:      Head: Normocephalic and atraumatic.      Nose: Nose normal.   Eyes:      Extraocular Movements: Extraocular movements intact.      Conjunctiva/sclera: Conjunctivae normal.   Cardiovascular:      Rate and Rhythm: Normal rate and regular rhythm.   Pulmonary:      Effort: Pulmonary effort is normal. No respiratory distress.   Abdominal:      General: Abdomen is flat. Bowel sounds are normal. There is no distension.      Palpations: Abdomen is soft.      Tenderness: There is abdominal tenderness. There is no guarding or rebound.      Comments: Minimal tenderness to palpation in the left lower quadrant   Genitourinary:     Comments: Drain left in place connected to TANYA rising from posterior lateral  flank  Musculoskeletal:         General: Normal range of motion.      Cervical back: Normal range of motion.   Skin:     General: Skin is warm.   Neurological:      Mental Status: She is alert and oriented to person, place, and time.   Psychiatric:         Mood and Affect: Mood normal.         Behavior: Behavior normal.         Thought Content: Thought content normal.             Significant Labs: All pertinent labs within the past 24 hours have been reviewed.    Significant Imaging: I have reviewed all pertinent imaging results/findings within the past 24 hours.    Assessment/Plan:      * Diverticulitis of large intestine with abscess without bleeding  - Diagnosed with diverticulitis at the end of January, prescribed Cipro/Flagyl but developed a rash and was switched to Augmentin; CT AP on admission revealed acute on chronic diverticulitis with deep pelvic abscess. 1/4 SIRS for tachycardia on admission  - Continue IV Zosyn  - Surgery consulted, appreciate input  - IR consulted, drainage of abscess with drain in place on 2/22  - Repeat CT scan of the abdomen pelvis showed ongoing sigmoid diverticulitis and apparent resolution of the previously identified deep pelvic access with percutaneous drain in place on 02/23  - Clinically much improved today, improved urine output and near resolution of symptoms   - Advance diet as per General surgery, plan to increase to low residue today  - Continue pain control and supportive care  - Follow up on cultures  - Possible discharge in 1-2 days if patient continues to do well and after clearance from General surgery    Frequency of urination  - Likely spasm but further workup with repeat CT done  - Ditropan PRN for spasm  - Symptomatically much improved, possible irritation of bladder from diverticulitis/infection    Renal mass  - Initially presented for difficulty urinating, UA pristine  - CT AP reveals left renal mass worrisome for renal cell carcinoma, discussed with  patient  - Urology consulted, appreciate input.  Concerning for renal carcinoma. Plan for outpatient follow up with Dr Giles,  onc, to discuss renal mass excision   - Continue to trend with labs      VTE Risk Mitigation (From admission, onward)           Ordered     IP VTE HIGH RISK PATIENT  Once         02/21/24 0592                        Judith Castro MD  Department of Hospital Medicine   [facility] - Med Surg ([address])

## 2024-02-24 NOTE — SUBJECTIVE & OBJECTIVE
Interval History:  No acute events.  Patient reports feeling much better today, less urgency and frequency with urination, and voided 950 mL overnight.  Making more urine, overall feeling better.  Minimal pain in the belly, which she describes as soreness.    Review of Systems   Constitutional:  Negative for chills and fever.   Respiratory:  Negative for shortness of breath.    Genitourinary:  Positive for decreased urine volume, dysuria, frequency and urgency.     Objective:     Vital Signs (Most Recent):  Temp: 98.5 °F (36.9 °C) (02/24/24 0830)  Pulse: 83 (02/24/24 0830)  Resp: 18 (02/24/24 0830)  BP: (!) 127/59 (02/24/24 0830)  SpO2: 95 % (02/24/24 0830) Vital Signs (24h Range):  Temp:  [97.8 °F (36.6 °C)-98.7 °F (37.1 °C)] 98.5 °F (36.9 °C)  Pulse:  [64-83] 83  Resp:  [18] 18  SpO2:  [94 %-98 %] 95 %  BP: (113-148)/(56-71) 127/59     Weight: 69.4 kg (153 lb)  Body mass index is 28.91 kg/m².    Intake/Output Summary (Last 24 hours) at 2/24/2024 0941  Last data filed at 2/24/2024 0920  Gross per 24 hour   Intake 688.7 ml   Output 1107.5 ml   Net -418.8 ml           Physical Exam  Constitutional:       General: She is not in acute distress.     Appearance: Normal appearance. She is not ill-appearing.   HENT:      Head: Normocephalic and atraumatic.      Nose: Nose normal.   Eyes:      Extraocular Movements: Extraocular movements intact.      Conjunctiva/sclera: Conjunctivae normal.   Cardiovascular:      Rate and Rhythm: Normal rate and regular rhythm.   Pulmonary:      Effort: Pulmonary effort is normal. No respiratory distress.   Abdominal:      General: Abdomen is flat. Bowel sounds are normal. There is no distension.      Palpations: Abdomen is soft.      Tenderness: There is abdominal tenderness. There is no guarding or rebound.      Comments: Minimal tenderness to palpation in the left lower quadrant   Genitourinary:     Comments: Drain left in place connected to TANYA rising from posterior lateral  flank  Musculoskeletal:         General: Normal range of motion.      Cervical back: Normal range of motion.   Skin:     General: Skin is warm.   Neurological:      Mental Status: She is alert and oriented to person, place, and time.   Psychiatric:         Mood and Affect: Mood normal.         Behavior: Behavior normal.         Thought Content: Thought content normal.             Significant Labs: All pertinent labs within the past 24 hours have been reviewed.    Significant Imaging: I have reviewed all pertinent imaging results/findings within the past 24 hours.

## 2024-02-25 VITALS
HEART RATE: 89 BPM | RESPIRATION RATE: 18 BRPM | SYSTOLIC BLOOD PRESSURE: 137 MMHG | TEMPERATURE: 98 F | OXYGEN SATURATION: 96 % | BODY MASS INDEX: 28.89 KG/M2 | DIASTOLIC BLOOD PRESSURE: 64 MMHG | WEIGHT: 153 LBS | HEIGHT: 61 IN

## 2024-02-25 LAB
ANION GAP SERPL CALC-SCNC: 9 MMOL/L (ref 8–16)
BASOPHILS # BLD AUTO: 0.02 K/UL (ref 0–0.2)
BASOPHILS NFR BLD: 0.2 % (ref 0–1.9)
BUN SERPL-MCNC: 8 MG/DL (ref 8–23)
CALCIUM SERPL-MCNC: 9.1 MG/DL (ref 8.7–10.5)
CHLORIDE SERPL-SCNC: 100 MMOL/L (ref 95–110)
CO2 SERPL-SCNC: 29 MMOL/L (ref 23–29)
CREAT SERPL-MCNC: 0.7 MG/DL (ref 0.5–1.4)
DIFFERENTIAL METHOD BLD: ABNORMAL
EOSINOPHIL # BLD AUTO: 0.1 K/UL (ref 0–0.5)
EOSINOPHIL NFR BLD: 1.7 % (ref 0–8)
ERYTHROCYTE [DISTWIDTH] IN BLOOD BY AUTOMATED COUNT: 12 % (ref 11.5–14.5)
EST. GFR  (NO RACE VARIABLE): >60 ML/MIN/1.73 M^2
GLUCOSE SERPL-MCNC: 105 MG/DL (ref 70–110)
HCT VFR BLD AUTO: 36.9 % (ref 37–48.5)
HGB BLD-MCNC: 12.2 G/DL (ref 12–16)
IMM GRANULOCYTES # BLD AUTO: 0.03 K/UL (ref 0–0.04)
IMM GRANULOCYTES NFR BLD AUTO: 0.4 % (ref 0–0.5)
LYMPHOCYTES # BLD AUTO: 2 K/UL (ref 1–4.8)
LYMPHOCYTES NFR BLD: 25.2 % (ref 18–48)
MAGNESIUM SERPL-MCNC: 2 MG/DL (ref 1.6–2.6)
MCH RBC QN AUTO: 30.8 PG (ref 27–31)
MCHC RBC AUTO-ENTMCNC: 33.1 G/DL (ref 32–36)
MCV RBC AUTO: 93 FL (ref 82–98)
MONOCYTES # BLD AUTO: 0.8 K/UL (ref 0.3–1)
MONOCYTES NFR BLD: 9.9 % (ref 4–15)
NEUTROPHILS # BLD AUTO: 5.1 K/UL (ref 1.8–7.7)
NEUTROPHILS NFR BLD: 62.6 % (ref 38–73)
NRBC BLD-RTO: 0 /100 WBC
PHOSPHATE SERPL-MCNC: 2.3 MG/DL (ref 2.7–4.5)
PLATELET # BLD AUTO: 364 K/UL (ref 150–450)
PMV BLD AUTO: 8.1 FL (ref 9.2–12.9)
POTASSIUM SERPL-SCNC: 4 MMOL/L (ref 3.5–5.1)
RBC # BLD AUTO: 3.96 M/UL (ref 4–5.4)
SODIUM SERPL-SCNC: 138 MMOL/L (ref 136–145)
WBC # BLD AUTO: 8.1 K/UL (ref 3.9–12.7)

## 2024-02-25 PROCEDURE — 83735 ASSAY OF MAGNESIUM: CPT | Mod: HCNC | Performed by: HOSPITALIST

## 2024-02-25 PROCEDURE — 99221 1ST HOSP IP/OBS SF/LOW 40: CPT | Mod: HCNC,,, | Performed by: STUDENT IN AN ORGANIZED HEALTH CARE EDUCATION/TRAINING PROGRAM

## 2024-02-25 PROCEDURE — 36415 COLL VENOUS BLD VENIPUNCTURE: CPT | Mod: HCNC | Performed by: HOSPITALIST

## 2024-02-25 PROCEDURE — 25000003 PHARM REV CODE 250: Mod: HCNC | Performed by: HOSPITALIST

## 2024-02-25 PROCEDURE — 63600175 PHARM REV CODE 636 W HCPCS: Mod: HCNC | Performed by: PHYSICIAN ASSISTANT

## 2024-02-25 PROCEDURE — 84100 ASSAY OF PHOSPHORUS: CPT | Mod: HCNC | Performed by: HOSPITALIST

## 2024-02-25 PROCEDURE — 25000003 PHARM REV CODE 250: Mod: HCNC | Performed by: PHYSICIAN ASSISTANT

## 2024-02-25 PROCEDURE — 85025 COMPLETE CBC W/AUTO DIFF WBC: CPT | Mod: HCNC | Performed by: HOSPITALIST

## 2024-02-25 PROCEDURE — 80048 BASIC METABOLIC PNL TOTAL CA: CPT | Mod: HCNC | Performed by: HOSPITALIST

## 2024-02-25 RX ORDER — AMOXICILLIN AND CLAVULANATE POTASSIUM 875; 125 MG/1; MG/1
1 TABLET, FILM COATED ORAL 2 TIMES DAILY
Qty: 14 TABLET | Refills: 0 | Status: SHIPPED | OUTPATIENT
Start: 2024-02-25 | End: 2024-03-03

## 2024-02-25 RX ORDER — ACETAMINOPHEN 325 MG/1
650 TABLET ORAL EVERY 6 HOURS PRN
Refills: 0
Start: 2024-02-25

## 2024-02-25 RX ORDER — SODIUM,POTASSIUM PHOSPHATES 280-250MG
2 POWDER IN PACKET (EA) ORAL ONCE
Status: COMPLETED | OUTPATIENT
Start: 2024-02-25 | End: 2024-02-25

## 2024-02-25 RX ADMIN — PANTOPRAZOLE SODIUM 40 MG: 40 TABLET, DELAYED RELEASE ORAL at 09:02

## 2024-02-25 RX ADMIN — PIPERACILLIN SODIUM AND TAZOBACTAM SODIUM 4.5 G: 4; .5 INJECTION, POWDER, LYOPHILIZED, FOR SOLUTION INTRAVENOUS at 06:02

## 2024-02-25 RX ADMIN — POTASSIUM & SODIUM PHOSPHATES POWDER PACK 280-160-250 MG 2 PACKET: 280-160-250 PACK at 09:02

## 2024-02-25 RX ADMIN — BRIMONIDINE TARTRATE 1 DROP: 1.5 SOLUTION/ DROPS OPHTHALMIC at 09:02

## 2024-02-25 NOTE — PLAN OF CARE
02/25/24 1105   Final Note   Assessment Type Final Discharge Note   Anticipated Discharge Disposition Home   Hospital Resources/Appts/Education Provided Provided patient/caregiver with written discharge plan information;Appointments scheduled and added to AVS   Post-Acute Status   Discharge Delays None known at this time     Pt states she lives independently at home.     Family to provide transportation home.    No DC needs from CM perspective.

## 2024-02-25 NOTE — DISCHARGE SUMMARY
"[facility] - [facility] Surg ([address] Medicine  Discharge Summary      Patient Name: Chely Kramer  MRN: [id number]  BELLA: [id number]  Patient Class: IP- Inpatient  Admission Date: 2/21/2024  Hospital Length of Stay: 4 days  Discharge Date and Time: 2/25/2024 11:22 AM  Attending Physician: Judith Castro MD  Discharging Provider: Judith Castro MD  Primary Care Provider: KIA Herrera MD    Primary Care Team: Networked reference to record PCT     HPI:   Chely Kramer is a pleasant 73F with history of diverticulitis, glaucoma who presents for difficulty urinating for several days. Every time she goes to urinate she feels as though she is not emptying her bladder completely. She feels like she is "running to the bathroom every ten minutes". If she does not go to urinate when she has the urge then she experiences lower abdominal pain.  No pain or discomfort with urination, no blood in urine. Denies fever, nausea, vomiting, diarrhea. At the end of January she saw her PCP for abdominal pain and was treated with Cipro/Flagyl for diverticulitis, however she developed a rash and was switched to Augmentin. She completed a 7 day course, but says her symptoms persisted. Her last BM was this morning, she does note that her stool is different consistency than usual, describes it as "mandeep"    In ER: afebrile without leukocytosis, mildly tachycardic ; CMP unremarkable, UA unremarkable, CT AP shows findings worrisome for acute on chronic diverticulitis of sigmoid colon with focal abscess in deep pelvis. 4.3cm left renal mass worrisome for renal cell carcinoma. ER discussed case with surgery who recommend IR consult, plan for drainage tomorrow. Urology also consulted for renal mass. Started on IV Zosyn    Procedure(s) (LRB):  ABSCESS DRAINAGE (N/A)      Hospital Course:   Ms. Kramer presented with dysuria.  Admitted with diverticulitis with associated abscess and noted renal mass on imaging.  Empirically started on " Zosyn.  General surgery, Urology and IR consulted.  Drainage of abscess with drain in place on 2/22 performed by IR. Repeat CT scan showed resolution of abscess. She clinically improved, remained AF and WBC was normal. Patient completed 5 days of IV Zosyn and was transitioned to PO Augmentin to complete 10 day course of treatment in total. Drain to remain in place until seen in General surgery clinic within 1 week. Patient to follow up with Dr. Pineda within 1 week, and follow up with Urology to address the renal mass and follow up with her PCP.    * Diverticulitis of large intestine with abscess without bleeding  - Diagnosed with diverticulitis at the end of January, prescribed Cipro/Flagyl but developed a rash and was switched to Augmentin; CT AP on admission revealed acute on chronic diverticulitis with deep pelvic abscess. 1/4 SIRS for tachycardia on admission  - Treated with IV Zosyn  - Surgery consulted, appreciated input  - IR consulted, drainage of abscess with drain in place performed on 2/22  - Repeat CT scan of the abdomen pelvis showed ongoing sigmoid diverticulitis and apparent resolution of the previously identified deep pelvic access with percutaneous drain in place on 02/23  - Clinically improved, increased urine output and with decreased symptoms of dysuria   - Advanced diet to low residue as per General surgery without difficulty  - Continued pain control and supportive care  - Culture from abscess were still pending prior to discharge   - Patient was seen and examined on day of discharge. She had minimal abdominal pain controlled with Tylenol and urinary symptoms improved. She was AF, vital signs stable and WBC remained normal. Case discussed with General surgery, patient was transitioned to p.o. Augmentin to complete full 10 day course of treatment with antibiotics.  - Drain to abscess to remain in place until seen in General surgery clinic  - Follow up with Dr. Pineda (General surgery) within 1  week, plan for 3/2/24     Renal mass  - Initially presented for difficulty urinating, UA pristine  - CT AP showed left renal mass worrisome for renal cell carcinoma, discussed with patient in detail  - Urology consulted, appreciate input.  Concerning for renal carcinoma. Plan for outpatient follow up with Dr Giles,  onc, to discuss renal mass excision and treatment plan      Frequency of urination  - Likely spasm but further workup with repeat CT done as discussed above  - Ditropan PRN for spasm and symptom improved with less frequency  - Probable irritation of bladder from diverticulitis/infection which was improving with treatment     Goals of Care Treatment Preferences:  Code Status: Full Code      Consults:   Consults (From admission, onward)          Status Ordering Provider     Inpatient consult to Registered Dietitian/Nutritionist  Once        Provider:  (Not yet assigned)    Completed CHRISTOPHE MAYO     Inpatient consult to Urology  Once        Provider:  (Not yet assigned)    Completed MEG CRUZ     Inpatient consult to Interventional Radiology  Once        Provider:  (Not yet assigned)    Completed MEG CRUZ          Service: Hospital Medicine    Final Active Diagnoses:    Diagnosis Date Noted POA    PRINCIPAL PROBLEM:  Diverticulitis of large intestine with abscess without bleeding [K57.20] 01/29/2024 Yes    Moderate malnutrition [E44.0] 02/22/2024 Yes    Renal mass [N28.89] 02/21/2024 Yes    Frequency of urination [R35.0] 02/21/2024 Yes    Primary open angle glaucoma (POAG) of left eye, indeterminate stage [H40.1124] 01/10/2019 Yes    History of colonic diverticulitis [Z87.19] 01/10/2019 Not Applicable      Problems Resolved During this Admission:       Discharged Condition: good    Disposition: Home or Self Care    Follow Up:   Follow-up Information       KIA Herrera MD Follow up in 1 week(s).    Specialty: Internal Medicine  Contact information:  [address]  [address]  [place] [address]  [phone]               Keyshawn Pineda Jr., MD Follow up on 3/1/2024.    Specialties: General Surgery, Vascular Surgery  Contact information:  [address]  [address]  [place] [address]  [phone]               Bob Giles MD Follow up in 2 week(s).    Specialty: Urology  Contact information:  [address]  [address]  [place] [facility] [id number] [phone]                           Patient Instructions:      Ambulatory referral/consult to Urology   Standing Status: Future   Referral Priority: Routine Referral Type: Consultation   Referral Reason: Specialty Services Required   Requested Specialty: Urology   Number of Visits Requested: 1     Ambulatory referral/consult to General Surgery   Standing Status: Future   Referral Priority: Routine Referral Type: Consultation   Referral Reason: Specialty Services Required   Requested Specialty: General Surgery   Number of Visits Requested: 1     Diet Adult Regular     Other restrictions (specify):   Order Comments: No soaking baths     Activity as tolerated       Significant Diagnostic Studies: N/A    Pending Diagnostic Studies:       None           Medications:  Reconciled Home Medications:      Medication List        START taking these medications      acetaminophen 325 MG tablet  Commonly known as: TYLENOL  Take 2 tablets (650 mg total) by mouth every 6 (six) hours as needed for Pain.     amoxicillin-clavulanate 875-125mg 875-125 mg per tablet  Commonly known as: AUGMENTIN  Take 1 tablet by mouth 2 (two) times daily. for 7 days            CONTINUE taking these medications      ascorbic acid (vitamin C) 500 MG tablet  Commonly known as: VITAMIN C  Take 2,500 mg by mouth once daily.     brimonidine 0.2% 0.2 % Drop  Commonly known as: ALPHAGAN  Place 1 drop into both eyes 2 (two) times daily.     dicyclomine 20 mg tablet  Commonly known as: BENTYL  Take 1 tablet (20 mg total) by mouth 3 (three) times daily as needed (abdominal cramping).      latanoprost 0.005 % ophthalmic solution  Place 1 drop into both eyes every evening.     pantoprazole 40 MG tablet  Commonly known as: PROTONIX  Take 1 tablet (40 mg total) by mouth once daily.     traZODone 50 MG tablet  Commonly known as: DESYREL  Take 1/2-1 tablet nightly as needed for insomnia.              Indwelling Lines/Drains at time of discharge:   Lines/Drains/Airways       Drain  Duration                  Closed/Suction Drain 02/22/24 1000 Left Buttock Bulb 10 Fr. 3 days                    Time spent on the discharge of patient: 35 minutes         Judith Castro MD  Department of Hospital Medicine  [facility] Surg ([address])

## 2024-02-25 NOTE — PLAN OF CARE
Problem: Adult Inpatient Plan of Care  Goal: Plan of Care Review  Outcome: Ongoing, Progressing  Goal: Patient-Specific Goal (Individualized)  Outcome: Ongoing, Progressing  Goal: Absence of Hospital-Acquired Illness or Injury  Outcome: Ongoing, Progressing  Goal: Optimal Comfort and Wellbeing  Outcome: Ongoing, Progressing  Goal: Readiness for Transition of Care  Outcome: Ongoing, Progressing     Problem: Infection  Goal: Absence of Infection Signs and Symptoms  Outcome: Ongoing, Progressing     Problem: Urinary Elimination Impairment  Goal: Effective Urinary Elimination/Continence  Outcome: Ongoing, Progressing     Problem: Fall Injury Risk  Goal: Absence of Fall and Fall-Related Injury  Outcome: Ongoing, Progressing

## 2024-02-25 NOTE — DISCHARGE INSTRUCTIONS
TANYA Drain Log Sheet   Date                                    Amount                                          Date                      Amount

## 2024-02-25 NOTE — PLAN OF CARE
02/25/24 1105   Medicare Message   Important Message from Medicare regarding Discharge Appeal Rights Given to patient/caregiver;Explained to patient/caregiver;Signed/date by patient/caregiver   Date IMM was signed 02/25/24   Time IMM was signed 1103

## 2024-02-25 NOTE — PROGRESS NOTES
Patient Name: Chely Kramer  Date: 2/25/2024  Service: Colon and Rectal Surgery    Subjective:  No acute events overnight. Denies abdominal pain. Tolerated small volume LRD yesterday without worsening pain, urinary symptoms or nausea/vomiting. Moderate volume liquid BM yesterday without melena/hematochezia. Denies fevers/chills/sweats. Overall feels better with decreased urinary urgency, feels like she is fully emptying her bladder without persistent retention. No other concerns.    Medications:    Current Facility-Administered Medications:     acetaminophen tablet 1,000 mg, 1,000 mg, Oral, Q8H PRN, Rebeka Blackmon, PA-C, 1,000 mg at 02/24/24 1657    acetaminophen tablet 650 mg, 650 mg, Oral, Q4H PRN, Rebeka Blackmon PA-C, 650 mg at 02/22/24 1158    albuterol-ipratropium 2.5 mg-0.5 mg/3 mL nebulizer solution 3 mL, 3 mL, Nebulization, Q4H PRN, Rebeka Blackmon PA-C    aluminum-magnesium hydroxide-simethicone 200-200-20 mg/5 mL suspension 30 mL, 30 mL, Oral, QID PRN, Rebeka Blackmon PA-C    brimonidine 0.15 % OPTH DROP ophthalmic solution 1 drop, 1 drop, Both Eyes, BID, Rebeka Blackmon PA-NITO, 1 drop at 02/25/24 0918    dextrose 10% bolus 125 mL 125 mL, 12.5 g, Intravenous, PRN, Rebeka Blackmon PA-NITO    dextrose 10% bolus 250 mL 250 mL, 25 g, Intravenous, PRN, Rebeka Blackmon PA-C    dicyclomine capsule 20 mg, 20 mg, Oral, TID PRN, Rebeka Blackmon PA-NITO, 20 mg at 02/21/24 1637    glucagon (human recombinant) injection 1 mg, 1 mg, Intramuscular, PRN, Rebeka Blackmon PA-NITO    glucose chewable tablet 16 g, 16 g, Oral, PRN, Rebeka Blackmon PA-NITO    glucose chewable tablet 24 g, 24 g, Oral, PRN, Rebeka Blackmon PA-C    HYDROcodone-acetaminophen  mg per tablet 1 tablet, 1 tablet, Oral, Q6H PRN, Rebeka Blackmon PA-C, 1 tablet at 02/22/24 1917    latanoprost 0.005 % ophthalmic solution 1 drop, 1 drop, Both Eyes, QHS, Rebeka Blackmon PA-C, 1 drop at 02/24/24 2112    melatonin  tablet 6 mg, 6 mg, Oral, Nightly PRN, Rebeka Blackmon, PA-C, 6 mg at 02/22/24 2355    naloxone 0.4 mg/mL injection 0.02 mg, 0.02 mg, Intravenous, PRN, Rebeka Blackmon, PA-C    ondansetron disintegrating tablet 8 mg, 8 mg, Oral, Q8H PRN, Rebeka Blackmon PA-C    oxybutynin tablet 5 mg, 5 mg, Oral, TID PRN, Carlos Alberto Lennon NP, 5 mg at 02/23/24 2158    pantoprazole EC tablet 40 mg, 40 mg, Oral, Daily, Rebeka Blackmon, PA-C, 40 mg at 02/25/24 0918    piperacillin-tazobactam (ZOSYN) 4.5 g in dextrose 5 % in water (D5W) 100 mL IVPB (MB+), 4.5 g, Intravenous, Q8H, Rebeka Blackmon PA-NITO, Last Rate: 25 mL/hr at 02/25/24 0622, 4.5 g at 02/25/24 0622    prochlorperazine injection Soln 5 mg, 5 mg, Intravenous, Q6H PRN, Rebeka Blackmon PA-NITO    simethicone chewable tablet 80 mg, 1 tablet, Oral, QID PRN, Rebeka Blackmon, PA-C, 80 mg at 02/21/24 1637    sodium chloride 0.9% flush 5 mL, 5 mL, Intravenous, PRN, Rebeka Blackmon, PA-NITO    traZODone tablet 50 mg, 50 mg, Oral, Nightly PRN, Rebeka Blackmon, PA-C, 50 mg at 02/24/24 2116    Vital Signs:  Vitals:    02/25/24 0846   BP: 137/64   Pulse: 89   Resp: 18   Temp: 98.1 °F (36.7 °C)       In/Out:  Intake/Output - Last 3 Shifts         02/23 0700 02/24 0659 02/24 0700 02/25 0659 02/25 0700 02/26 0659    P.O. 300 240     I.V. (mL/kg) 0 (0)      Other 10      IV Piggyback 198.7      Total Intake(mL/kg) 508.7 (7.3) 240 (3.5)     Urine (mL/kg/hr) 950 (0.6) 500 (0.3)     Emesis/NG output 0      Drains 7.5 10     Other 0      Stool 0      Total Output 957.5 510     Net -448.8 -270            Urine Occurrence 0 x 6 x     Stool Occurrence 2 x 1 x     Emesis Occurrence 0 x              Physical Exam:  General: Alert, oriented, in no apparent distress  HEENT: Sclera anicteric, trachea midline  Lungs: Normal respiratory rate and effort on room air  Abdomen: Soft, nontender, nondistended. Well-healed midline scars without palpable incisional hernia. Transgluteal  "IR drain with small volume thin serosanguinous output.  Extremities: Warm, well perfused, no edema  Neuro: Grossly intact, moves all extremities  Psych: Appropriate affect    Laboratory Studies:  Complete Blood Count:  Recent Labs   Lab 02/23/24  0531 02/24/24  0502 02/25/24  0734   WBC 9.10 10.61 8.10   RBC 3.94* 4.33 3.96*   HGB 12.2 13.1 12.2   HCT 37.0 40.1 36.9*    397 364     CRP:  No results for input(s): "CRP" in the last 72 hours.    Basic Chemistry Panel:  Recent Labs   Lab 02/25/24  0734      K 4.0      CO2 29   BUN 8   CREATININE 0.7   CALCIUM 9.1   PHOS 2.3*       Imaging Studies:  None new    Assessment:  Chely Kramer is a 73 y.o. year old female with history of glaucoma and diverticulitis who presents with symptoms of urinary retention, found to have likely complicated diverticulitis with abscess s/p IR drainage 2/22/24 with symptomatic improvement and radiographic improvement on repeat noncon CT 2/23/24, doing well.    Plan:  - Cont LRD upon discharge  - IR cultures with rare GPC/GNR andmany WBC on GS, cultures pending/NGTD  - Transition to Augmentin to complete 10 day total course  - Continue IR drain to bulb suction with plans for outpatient removal given amount of persistent inflammation on scan and risk for abscess reaccumulation  - Ok for discharge from surgical standpoint: patient should follow up with Dr. Pineda in ~1 week (call office to schedule) for drain removal    Boyd An MD  Staff Surgeon  "

## 2024-02-25 NOTE — PLAN OF CARE
In agreement and eager for DC.  VU of DC instructions, paperwork passed. IV removed with cath tip intact, WNL.  FU appt made   To be DC home with [facility].  Will be escorted downstairs via  transport team once dressed and ready.   Free from falls or skin breakdown this hospital admission.     TANYA drain care education provided with RD and flowsheet for FU passed

## 2024-02-25 NOTE — NURSING
TANYA drain flushed w/10cc. VSS and afebrile. Pain controlled PO.  NADN. Call bell within reach. Safety measures maintained.

## 2024-02-26 ENCOUNTER — TELEPHONE (OUTPATIENT)
Dept: ORTHOPEDICS | Facility: CLINIC | Age: 74
End: 2024-02-26
Payer: MEDICARE

## 2024-02-26 LAB
BACTERIA SPEC AEROBE CULT: NO GROWTH
BACTERIA SPEC ANAEROBE CULT: ABNORMAL

## 2024-02-28 ENCOUNTER — OFFICE VISIT (OUTPATIENT)
Dept: INTERVENTIONAL RADIOLOGY/VASCULAR | Facility: CLINIC | Age: 74
End: 2024-02-28
Payer: MEDICARE

## 2024-02-28 ENCOUNTER — TELEPHONE (OUTPATIENT)
Dept: SURGERY | Facility: CLINIC | Age: 74
End: 2024-02-28
Payer: MEDICARE

## 2024-02-28 VITALS
RESPIRATION RATE: 18 BRPM | SYSTOLIC BLOOD PRESSURE: 144 MMHG | OXYGEN SATURATION: 96 % | DIASTOLIC BLOOD PRESSURE: 73 MMHG | WEIGHT: 153.44 LBS | HEART RATE: 103 BPM | BODY MASS INDEX: 28.97 KG/M2 | HEIGHT: 61 IN

## 2024-02-28 DIAGNOSIS — K57.20 DIVERTICULITIS OF LARGE INTESTINE WITH ABSCESS WITHOUT BLEEDING: Primary | ICD-10-CM

## 2024-02-28 PROCEDURE — 3008F BODY MASS INDEX DOCD: CPT | Mod: HCNC,CPTII,S$GLB, | Performed by: RADIOLOGY

## 2024-02-28 PROCEDURE — 1101F PT FALLS ASSESS-DOCD LE1/YR: CPT | Mod: HCNC,CPTII,S$GLB, | Performed by: RADIOLOGY

## 2024-02-28 PROCEDURE — 99213 OFFICE O/P EST LOW 20 MIN: CPT | Mod: HCNC,S$GLB,, | Performed by: RADIOLOGY

## 2024-02-28 PROCEDURE — 3288F FALL RISK ASSESSMENT DOCD: CPT | Mod: HCNC,CPTII,S$GLB, | Performed by: RADIOLOGY

## 2024-02-28 PROCEDURE — 99999 PR PBB SHADOW E&M-EST. PATIENT-LVL III: CPT | Mod: PBBFAC,HCNC,,

## 2024-02-28 PROCEDURE — 3077F SYST BP >= 140 MM HG: CPT | Mod: HCNC,CPTII,S$GLB, | Performed by: RADIOLOGY

## 2024-02-28 PROCEDURE — 1111F DSCHRG MED/CURRENT MED MERGE: CPT | Mod: HCNC,CPTII,S$GLB, | Performed by: RADIOLOGY

## 2024-02-28 PROCEDURE — 3078F DIAST BP <80 MM HG: CPT | Mod: HCNC,CPTII,S$GLB, | Performed by: RADIOLOGY

## 2024-02-28 PROCEDURE — 3044F HG A1C LEVEL LT 7.0%: CPT | Mod: HCNC,CPTII,S$GLB, | Performed by: RADIOLOGY

## 2024-02-28 PROCEDURE — 1159F MED LIST DOCD IN RCRD: CPT | Mod: HCNC,CPTII,S$GLB, | Performed by: RADIOLOGY

## 2024-02-28 NOTE — TELEPHONE ENCOUNTER
Called pt back re: her message.  Pt stated that she called but has no clue which dept she is supposed to call and why.  She has a pump that needs to get flushed so she is going to [facility] IR dept today to have that done. Her appt with Gen Surgery was cancelled and she ultimately wants her pump removed.  Message was sent to Dr. Tracee Thurman's box but pt has never been seen by Dr. Thurman in clinic.  Pt saw Dr. An during a hospital encounter at [facility].  RN will message Dr. Herrera's team for advisement.  Pt verbalized understanding to all but had to rush off the phone for her appt with IR.

## 2024-02-28 NOTE — PROGRESS NOTES
Consult/H&P Note  Interventional Radiology    Consult Requested By: Surgery    Reason for Consult: diverticular abscess    SUBJECTIVE:     Chief Complaint: dysuria    History of Present Illness: 72 yo F with recent hospitalization for diverticulitis with abscess.  Transgluteal drain placed during hospitalization.  Repeat CT following drainage showed no residual collection, and there has been no significant output from the drain since discharge.     Past Medical History:   Diagnosis Date    Glaucoma      Past Surgical History:   Procedure Laterality Date    ADENOIDECTOMY      APPENDECTOMY      Ruptured    BREAST BIOPSY      BREAST CYST EXCISION      HERNIA REPAIR Right     Ventral    INCISION AND DRAINAGE OF HAND Left 2019    Procedure: INCISION AND DRAINAGE, HAND (ADD ON );  Surgeon: Sameer Evans MD;  Location: [facility] OR;  Service: Orthopedics;  Laterality: Left;  AXILLARY BLOCK  (ADD ON )    NEEDLE LOCALIZATION N/A 2024    Procedure: ABSCESS DRAINAGE;  Surgeon: Fernando Thurman MD;  Location: [facility] CATH LAB;  Service: Radiology;  Laterality: N/A;    TONSILLECTOMY       Family History   Problem Relation Age of Onset    Heart disease Father 74        CABG    Parkinsonism Brother     Mental illness Brother         schizophrenia     Social History     Tobacco Use    Smoking status: Former     Current packs/day: 0.00     Types: Cigarettes     Quit date: 5/3/1992     Years since quittin.8    Smokeless tobacco: Never   Substance Use Topics    Alcohol use: Yes     Alcohol/week: 4.0 standard drinks of alcohol     Types: 4 Shots of liquor per week    Drug use: No       Review of Systems:  Constitutional/General:Negative for fever.  Hematological/Immuno: no known coagulopathies  Respiratory: no shortness of breath  Cardiovascular: no chest pain  Gastrointestinal: positive for - abdominal pain and change in bowel habits  Genito-Urinary: positive for - dysuria, pelvic pain, and urinary  frequency/urgency  Musculoskeletal: negative  Skin: Negative for rash, itching, pigmentation changes, nail or hair changes.  Neurological: no TIA or stroke symptoms  Psychiatric: normal mood/affect, good insight/judgement      OBJECTIVE:     Vital Signs Range (Last 24H):  [unfilled]    Physical Exam:  General- Patient alert and oriented x3 in NAD  ENT- PERRLA,  Neck- No masses  CV- Regular rate and rhythm  Resp-  No increased WOB  GI- Non tender/non-distended  Extrem- No cyanosis, clubbing, edema.   Derm- No rashes, masses, or lesions noted  Neuro-  No focal deficits noted.     Physical Exam  Body mass index is 28.99 kg/m².    Scheduled Meds:   Continuous Infusions:   PRN Meds:    Allergies:   Review of patient's allergies indicates:   Allergen Reactions    Ciprofloxacin (bulk) Hives    Flagyl [metronidazole] Hives       Labs:  @LABRCNTIP(INR, PT, PTT)@  @LABRCNTIP(WBC,HGB,HCT,MCV,PLT)@ @LABRCNTIP(GLU,NA,K,Cl,CO2,BUN,Creatinine,Calcium,MG,ALT,AST,ALBUMIN,bilitot,bilidir)@    Vitals (Most Recent):  Pulse: 103 (02/28/24 1340)  Resp: 18 (02/28/24 1340)  BP: (!) 144/73 (02/28/24 1340)  SpO2: 96 % (02/28/24 1340)        ASSESSMENT/PLAN:     Transgluteal pelvic drain with no significant output since hospital DC.  Updated CT prior to her hospital discharge showed no significant residual collection.  Drain was flushed without any further return of fluid, and the drain was removed in the office.    There are no hospital problems to display for this patient.          Michael Brwon MD

## 2024-03-04 ENCOUNTER — PATIENT OUTREACH (OUTPATIENT)
Dept: ADMINISTRATIVE | Facility: CLINIC | Age: 74
End: 2024-03-04
Payer: MEDICARE

## 2024-03-04 ENCOUNTER — OFFICE VISIT (OUTPATIENT)
Dept: INTERNAL MEDICINE | Facility: CLINIC | Age: 74
End: 2024-03-04
Payer: MEDICARE

## 2024-03-04 VITALS
WEIGHT: 154.56 LBS | HEIGHT: 61 IN | DIASTOLIC BLOOD PRESSURE: 69 MMHG | SYSTOLIC BLOOD PRESSURE: 133 MMHG | BODY MASS INDEX: 29.18 KG/M2 | OXYGEN SATURATION: 98 % | HEART RATE: 96 BPM

## 2024-03-04 DIAGNOSIS — N28.89 RENAL MASS: ICD-10-CM

## 2024-03-04 DIAGNOSIS — K57.20 DIVERTICULITIS OF LARGE INTESTINE WITH ABSCESS WITHOUT BLEEDING: ICD-10-CM

## 2024-03-04 DIAGNOSIS — Z09 HOSPITAL DISCHARGE FOLLOW-UP: Primary | ICD-10-CM

## 2024-03-04 DIAGNOSIS — B37.31 YEAST VAGINITIS: ICD-10-CM

## 2024-03-04 DIAGNOSIS — R01.1 SYSTOLIC MURMUR: ICD-10-CM

## 2024-03-04 PROCEDURE — 3008F BODY MASS INDEX DOCD: CPT | Mod: HCNC,CPTII,S$GLB, | Performed by: STUDENT IN AN ORGANIZED HEALTH CARE EDUCATION/TRAINING PROGRAM

## 2024-03-04 PROCEDURE — 3044F HG A1C LEVEL LT 7.0%: CPT | Mod: HCNC,CPTII,S$GLB, | Performed by: STUDENT IN AN ORGANIZED HEALTH CARE EDUCATION/TRAINING PROGRAM

## 2024-03-04 PROCEDURE — 99214 OFFICE O/P EST MOD 30 MIN: CPT | Mod: HCNC,S$GLB,, | Performed by: STUDENT IN AN ORGANIZED HEALTH CARE EDUCATION/TRAINING PROGRAM

## 2024-03-04 PROCEDURE — 99999 PR PBB SHADOW E&M-EST. PATIENT-LVL III: CPT | Mod: PBBFAC,HCNC,, | Performed by: STUDENT IN AN ORGANIZED HEALTH CARE EDUCATION/TRAINING PROGRAM

## 2024-03-04 PROCEDURE — 3075F SYST BP GE 130 - 139MM HG: CPT | Mod: HCNC,CPTII,S$GLB, | Performed by: STUDENT IN AN ORGANIZED HEALTH CARE EDUCATION/TRAINING PROGRAM

## 2024-03-04 PROCEDURE — 1126F AMNT PAIN NOTED NONE PRSNT: CPT | Mod: HCNC,CPTII,S$GLB, | Performed by: STUDENT IN AN ORGANIZED HEALTH CARE EDUCATION/TRAINING PROGRAM

## 2024-03-04 PROCEDURE — 3078F DIAST BP <80 MM HG: CPT | Mod: HCNC,CPTII,S$GLB, | Performed by: STUDENT IN AN ORGANIZED HEALTH CARE EDUCATION/TRAINING PROGRAM

## 2024-03-04 PROCEDURE — 3288F FALL RISK ASSESSMENT DOCD: CPT | Mod: HCNC,CPTII,S$GLB, | Performed by: STUDENT IN AN ORGANIZED HEALTH CARE EDUCATION/TRAINING PROGRAM

## 2024-03-04 PROCEDURE — 1159F MED LIST DOCD IN RCRD: CPT | Mod: HCNC,CPTII,S$GLB, | Performed by: STUDENT IN AN ORGANIZED HEALTH CARE EDUCATION/TRAINING PROGRAM

## 2024-03-04 PROCEDURE — 1101F PT FALLS ASSESS-DOCD LE1/YR: CPT | Mod: HCNC,CPTII,S$GLB, | Performed by: STUDENT IN AN ORGANIZED HEALTH CARE EDUCATION/TRAINING PROGRAM

## 2024-03-04 RX ORDER — FLUCONAZOLE 150 MG/1
150 TABLET ORAL DAILY
Qty: 1 TABLET | Refills: 0 | Status: SHIPPED | OUTPATIENT
Start: 2024-03-04 | End: 2024-03-05

## 2024-03-04 NOTE — PROGRESS NOTES
[facility]    Subjective:         Patient ID: Chely Kramer is a 73 y.o. female.    Chief Complaint: Establish Care (ER follow up as well/UTI)    History was obtained from the patient and supplemented through chart review.    HPI:  Patient is a 73 y.o. female who presents for hospital follow up.  She was hospitalized for diverticulitis with pelvic abscess.  This was drained by IR.  She has since followed up with IR and had her TANYA drain removed.  Incidental finding of renal mass was noted at that time and she was referred to Urology.    Patient has had return of bowel function however she was having loose stools and her bowel movements are not yet back to normal.    Patient would like a new primary care provider since she feels her previous PCP did not handle her most recent bout of diverticulitis appropriately.    She has a history of appendectomy and speculates that her diverticulitis may has been worsened by intra-abdominal scar tissue from her prior surgeries.     Patient thinks she may have a vaginal yeast infection from recent antibiotics.    Medical History  Past Medical History:   Diagnosis Date    Glaucoma            Surgical hx, family hx, social hx   Family History   Problem Relation Age of Onset    Heart disease Father 74        CABG    Parkinsonism Brother     Mental illness Brother         schizophrenia     Past Surgical History:   Procedure Laterality Date    ADENOIDECTOMY      APPENDECTOMY  1998    Ruptured    BREAST BIOPSY      BREAST CYST EXCISION      HERNIA REPAIR Right 2003    Ventral    INCISION AND DRAINAGE OF HAND Left 1/22/2019    Procedure: INCISION AND DRAINAGE, HAND (ADD ON );  Surgeon: Sameer Evans MD;  Location: [facility] OR;  Service: Orthopedics;  Laterality: Left;  AXILLARY BLOCK  (ADD ON )    NEEDLE LOCALIZATION N/A 2/22/2024    Procedure: ABSCESS DRAINAGE;  Surgeon: Fernando Thurman MD;  Location: [facility] CATH LAB;  Service: Radiology;  Laterality: N/A;     TONSILLECTOMY       Social History     Socioeconomic History    Marital status: Single    Number of children: 0   Occupational History     Employer: [employer]   Tobacco Use    Smoking status: Former     Current packs/day: 0.00     Types: Cigarettes     Quit date: 5/3/1992     Years since quittin.8    Smokeless tobacco: Never   Substance and Sexual Activity    Alcohol use: Yes     Alcohol/week: 4.0 standard drinks of alcohol     Types: 4 Shots of liquor per week    Drug use: No    Sexual activity: Never   Social History Narrative    From [place].    Works at [employer] .    Walks 10 blocks to and from work.    Bikes 2x/wk.     - Works weekends     Social Determinants of Health     Financial Resource Strain: Low Risk  (2024)    Overall Financial Resource Strain (CARDIA)     Difficulty of Paying Living Expenses: Not hard at all   Food Insecurity: No Food Insecurity (2024)    Hunger Vital Sign     Worried About Running Out of Food in the Last Year: Never true     Ran Out of Food in the Last Year: Never true   Transportation Needs: No Transportation Needs (2024)    PRAPARE - Transportation     Lack of Transportation (Medical): No     Lack of Transportation (Non-Medical): No   Physical Activity: Sufficiently Active (2024)    Exercise Vital Sign     Days of Exercise per Week: 3 days     Minutes of Exercise per Session: 150+ min   Stress: No Stress Concern Present (2024)    [employer] - Occupational Stress Questionnaire     Feeling of Stress : Only a little   Social Connections: Socially Isolated (2024)    Social Connection and Isolation Panel [NHANES]     Frequency of Communication with Friends and Family: More than three times a week     Frequency of Social Gatherings with Friends and Family: Patient declined     Attends Yazidism Services: Never     Active Member of Clubs or Organizations: No     Attends Club or Organization Meetings:  "Never     Marital Status: Never    Housing Stability: Low Risk  (2/27/2024)    Housing Stability Vital Sign     Unable to Pay for Housing in the Last Year: No     Number of Places Lived in the Last Year: 1     Unstable Housing in the Last Year: No       Immunization History   Administered Date(s) Administered    COVID-19, MRNA, LN-S, PF (Pfizer) (Gray Cap) 06/28/2022    COVID-19, MRNA, LN-S, PF (Pfizer) (Purple Cap) 03/05/2021, 03/26/2021, 10/04/2021    COVID-19, mRNA, LNP-S, PF (Moderna 2023)Ages 12+ 10/24/2023    COVID-19, mRNA, LNP-S, bivalent booster, PF (PFIZER OMICRON) 12/29/2022    Influenza (FLUAD) - Quadrivalent - Adjuvanted - PF *Preferred* (65+) 10/24/2023    Influenza - High Dose - PF (65 years and older) 10/05/2015, 10/05/2016, 09/27/2017, 10/03/2018, 10/10/2019, 10/04/2021    Influenza - Quadrivalent - High Dose - PF (65 years and older) 09/19/2022    Pneumococcal Conjugate - 13 Valent 01/01/2017    Pneumococcal Polysaccharide - 23 Valent 11/02/2015, 01/01/2018    Rsv, Bivalent, Rsvpref (Abrysvo) 10/25/2023    Td - PF (ADULT) 01/23/2019       Current Outpatient Medications   Medication Instructions    acetaminophen (TYLENOL) 650 mg, Oral, Every 6 hours PRN    ascorbic acid (vitamin C) (VITAMIN C) 2,500 mg, Oral, Daily    brimonidine 0.2% (ALPHAGAN) 0.2 % Drop 1 drop, Both Eyes, 2 times daily    dicyclomine (BENTYL) 20 mg, Oral, 3 times daily PRN    fluconazole (DIFLUCAN) 150 mg, Oral, Daily    latanoprost 0.005 % ophthalmic solution 1 drop, Both Eyes, Nightly    pantoprazole (PROTONIX) 40 mg, Oral, Daily    traZODone (DESYREL) 50 MG tablet Take 1/2-1 tablet nightly as needed for insomnia.       Objective:        Body mass index is 29.2 kg/m².  Vitals:    03/04/24 1507   BP: 133/69   Pulse: 96   SpO2: 98%   Weight: 70.1 kg (154 lb 8.7 oz)   Height: 5' 1" (1.549 m)   PainSc: 0-No pain     Physical Exam  Cardiovascular:      Rate and Rhythm: Normal rate.      Heart sounds: Murmur heard.      " Comments: 2/6 systolic murmur at the base  Pulmonary:      Effort: Pulmonary effort is normal. No respiratory distress.      Breath sounds: No wheezing.   Abdominal:      General: Abdomen is flat.   Musculoskeletal:      Right lower leg: No edema.      Left lower leg: No edema.   Neurological:      General: No focal deficit present.      Mental Status: She is alert.   Psychiatric:         Mood and Affect: Mood normal.           Lab Results   Component Value Date    WBC 8.10 02/25/2024    HGB 12.2 02/25/2024    HCT 36.9 (L) 02/25/2024     02/25/2024    CHOL 185 01/18/2024    TRIG 101 01/18/2024    HDL 61 01/18/2024    ALT 13 02/24/2024    AST 15 02/24/2024     02/25/2024    K 4.0 02/25/2024     02/25/2024    CREATININE 0.7 02/25/2024    BUN 8 02/25/2024    CO2 29 02/25/2024    TSH 2.649 01/18/2024    INR 1.1 02/22/2024    HGBA1C 5.1 01/18/2024       The 10-year ASCVD risk score (Len MOJICA, et al., 2019) is: 15.3%    Values used to calculate the score:      Age: 73 years      Sex: Female      Is Non- : No      Diabetic: No      Tobacco smoker: No      Systolic Blood Pressure: 133 mmHg      Is BP treated: No      HDL Cholesterol: 61 mg/dL      Total Cholesterol: 185 mg/dL    Assessment:         1. Hospital discharge follow-up    2. Yeast vaginitis    3. Systolic murmur    4. Renal mass    5. Diverticulitis of large intestine with abscess without bleeding          Plan:     Patient reviewed hospitalization and surgical history with me today.  She has follow up with General surgery in colorectal surgery scheduled.  She has urology follow up for her renal mass however this is not till May.  We have messaged patient access to have this expedited.    Patient signed up for [employer] today.  This should help facilitate communication and scheduling.    Treat for yeast vaginitis with 1 dose of oral fluconazole.    Hospital discharge follow-up    Yeast vaginitis  -     fluconazole  (DIFLUCAN) 150 MG Tab; Take 1 tablet (150 mg total) by mouth once daily. for 1 day  Dispense: 1 tablet; Refill: 0    Systolic murmur  -     Echo; Future; Expected date: 03/04/2024    Renal mass    Diverticulitis of large intestine with abscess without bleeding        Health Maintenance    All of your core healthy metrics are met.    No follow-ups on file. or sooner prchantel Mcfadden  [facility]  [address] [address]  Phone [phone]  Fax [phone]    This note is dictated using the M*Modal Fluency Direct word recognition program. It may contain word recognition mistakes or wrong word substitutions that were missed on review.  I spent a total of 38 minutes on the day of the visit.  This includes face to face time and non-face to face time preparing to see the patient (eg, review of tests), obtaining and/or reviewing separately obtained history, documenting clinical information in the electronic or other health record, independently interpreting results and communicating results to the patient/family/caregiver, or care coordinator.

## 2024-03-04 NOTE — PROGRESS NOTES
C3 nurse spoke with Chely Kramer  for a TCC post hospital discharge follow up call. The patient has a scheduled HOS appointment with Boubacar Cruz on 3/4/24 @ [phone].

## 2024-03-11 ENCOUNTER — TELEPHONE (OUTPATIENT)
Dept: SURGERY | Facility: CLINIC | Age: 74
End: 2024-03-11
Payer: MEDICARE

## 2024-03-11 NOTE — TELEPHONE ENCOUNTER
Spoke with pt informing her that Dr Reed sis not the correct provider for her to see at time Pt reaching ut to se someone regarding a recently discovered kidney tumor .Pt advised to speak with PCP for a Renal referral who will advise on the plan of care. Pt expressed understanding. Nurse Navigator did place call to and leave message for the office of Dr. Boubacar Cruz to follow -up with pt regarding recent findings and getting pt set up with Nephrology. Pt aware of upcoming ColonRectal appt at [facility] to address diverticulitis. .

## 2024-03-11 NOTE — TELEPHONE ENCOUNTER
----- Message from Lilian Mendoza sent at 3/11/2024 11:44 AM CDT -----  Regarding: Appt  Contact: Pt [phone]  Pt is calling to speak to nurse about appt please call

## 2024-03-12 ENCOUNTER — TELEPHONE (OUTPATIENT)
Dept: INTERNAL MEDICINE | Facility: CLINIC | Age: 74
End: 2024-03-12
Payer: MEDICARE

## 2024-03-12 DIAGNOSIS — N28.89 RENAL MASS: Primary | ICD-10-CM

## 2024-03-12 NOTE — TELEPHONE ENCOUNTER
----- Message from Luis Cedeno sent at 3/11/2024 12:08 PM CDT -----  Regarding: Chely  Type:Patient Callback     Who called: Chely     What is the request in detail: Pt stated that she would like to get a referral for a Urologist. Please reach out to the patient as soon as possible.     Can the clinic reply by [facility]? Yes     Would the patient rather a call back or a response via [facility]? Callback     Best call back number: [phone]      Additional Information:

## 2024-03-12 NOTE — TELEPHONE ENCOUNTER
Pt has uro appt scheduled from order  Called pt and LVM letting her know to call if needs anything

## 2024-03-12 NOTE — TELEPHONE ENCOUNTER
----- Message from Daniel Luz sent at 3/12/2024  8:59 AM CDT -----  Contact: patient  Type:  Patient Call          Who Called: patient         Does the patient know what this is regarding?: requesting a call back from a missed call ;pt would like to have a referral to nephrology ; please advise            Would the patient rather a call back or a response via [facility]?call           Best Call Back Number: [phone]             Additional Information:

## 2024-03-12 NOTE — TELEPHONE ENCOUNTER
Called pt back   Spoke w/ her  She said she wants a nephrology appointment as soon as possible. She has an almost 2 in tumor and she has a doctor picked out. She said no one has addressed the concern and she wants to see a nephrologist this week. She shared that she's had some trouble getting appointments scheduled. She said she does want to keep her other two appts but wants to see a nephrologist as well    I let her know I will check with the doctor about the referral and will call her back as soon as he advises    She verbalized understanding and had no further concerns or questions.

## 2024-03-12 NOTE — TELEPHONE ENCOUNTER
"Boubacar Mcfadden MD  You38 minutes ago (9:39 AM)     MB  Nephrology does not deal with kidney tumors.  That is handled by Urology.  We addressed this at her last visit.  She has a urology visit for March 25th.  Please call her to inform her of this       I called pt and reviewed this information. She said "How come the internet said that nephrology deals with kidney tumors?"  I told her I'm not sure why it said that on the website. She said "I'm going to have to call and see if [employer] can recommend an outside doctor."    I let her know that urology would definitely be the best dept to see for this concern, but that I can try to find her something sooner. She said that would be great.   I moved up appt to tomorrow in [place]    She vented about feeling like she's getting lost in the shuffle. I apologized for this and thanked her for bearing with us as we work on more streamlined communication and accessibility for patient appointments     She said "thank you for calming me down and for making accommodations to get me in sooner"     She  verbalized understanding and had no further concerns or questions.      "

## 2024-03-13 ENCOUNTER — TELEPHONE (OUTPATIENT)
Dept: UROLOGY | Facility: CLINIC | Age: 74
End: 2024-03-13

## 2024-03-13 ENCOUNTER — OFFICE VISIT (OUTPATIENT)
Dept: UROLOGY | Facility: CLINIC | Age: 74
End: 2024-03-13
Attending: UROLOGY
Payer: MEDICARE

## 2024-03-13 ENCOUNTER — LAB VISIT (OUTPATIENT)
Dept: LAB | Facility: HOSPITAL | Age: 74
End: 2024-03-13
Attending: UROLOGY
Payer: MEDICARE

## 2024-03-13 VITALS
SYSTOLIC BLOOD PRESSURE: 166 MMHG | BODY MASS INDEX: 27.83 KG/M2 | HEART RATE: 89 BPM | DIASTOLIC BLOOD PRESSURE: 82 MMHG | WEIGHT: 147.38 LBS | HEIGHT: 61 IN

## 2024-03-13 DIAGNOSIS — N28.89 RENAL MASS: ICD-10-CM

## 2024-03-13 LAB
ALBUMIN SERPL BCP-MCNC: 3.6 G/DL (ref 3.5–5.2)
ALP SERPL-CCNC: 96 U/L (ref 55–135)
ALT SERPL W/O P-5'-P-CCNC: 15 U/L (ref 10–44)
ANION GAP SERPL CALC-SCNC: 11 MMOL/L (ref 8–16)
AST SERPL-CCNC: 15 U/L (ref 10–40)
BASOPHILS # BLD AUTO: 0.05 K/UL (ref 0–0.2)
BASOPHILS NFR BLD: 0.6 % (ref 0–1.9)
BILIRUB SERPL-MCNC: 0.2 MG/DL (ref 0.1–1)
BUN SERPL-MCNC: 14 MG/DL (ref 8–23)
CALCIUM SERPL-MCNC: 10 MG/DL (ref 8.7–10.5)
CHLORIDE SERPL-SCNC: 102 MMOL/L (ref 95–110)
CO2 SERPL-SCNC: 26 MMOL/L (ref 23–29)
CREAT SERPL-MCNC: 0.7 MG/DL (ref 0.5–1.4)
DIFFERENTIAL METHOD BLD: ABNORMAL
EOSINOPHIL # BLD AUTO: 0.1 K/UL (ref 0–0.5)
EOSINOPHIL NFR BLD: 0.7 % (ref 0–8)
ERYTHROCYTE [DISTWIDTH] IN BLOOD BY AUTOMATED COUNT: 12.6 % (ref 11.5–14.5)
EST. GFR  (NO RACE VARIABLE): >60 ML/MIN/1.73 M^2
GLUCOSE SERPL-MCNC: 91 MG/DL (ref 70–110)
HCT VFR BLD AUTO: 37.8 % (ref 37–48.5)
HGB BLD-MCNC: 12.6 G/DL (ref 12–16)
IMM GRANULOCYTES # BLD AUTO: 0.02 K/UL (ref 0–0.04)
IMM GRANULOCYTES NFR BLD AUTO: 0.2 % (ref 0–0.5)
LYMPHOCYTES # BLD AUTO: 2.9 K/UL (ref 1–4.8)
LYMPHOCYTES NFR BLD: 31.5 % (ref 18–48)
MCH RBC QN AUTO: 31.3 PG (ref 27–31)
MCHC RBC AUTO-ENTMCNC: 33.3 G/DL (ref 32–36)
MCV RBC AUTO: 94 FL (ref 82–98)
MONOCYTES # BLD AUTO: 0.7 K/UL (ref 0.3–1)
MONOCYTES NFR BLD: 8.2 % (ref 4–15)
NEUTROPHILS # BLD AUTO: 5.3 K/UL (ref 1.8–7.7)
NEUTROPHILS NFR BLD: 58.8 % (ref 38–73)
NRBC BLD-RTO: 0 /100 WBC
PLATELET # BLD AUTO: 405 K/UL (ref 150–450)
PMV BLD AUTO: 8.6 FL (ref 9.2–12.9)
POTASSIUM SERPL-SCNC: 3.7 MMOL/L (ref 3.5–5.1)
PROT SERPL-MCNC: 7.5 G/DL (ref 6–8.4)
RBC # BLD AUTO: 4.03 M/UL (ref 4–5.4)
SODIUM SERPL-SCNC: 139 MMOL/L (ref 136–145)
WBC # BLD AUTO: 9.07 K/UL (ref 3.9–12.7)

## 2024-03-13 PROCEDURE — 1159F MED LIST DOCD IN RCRD: CPT | Mod: HCNC,CPTII,S$GLB, | Performed by: UROLOGY

## 2024-03-13 PROCEDURE — 3044F HG A1C LEVEL LT 7.0%: CPT | Mod: HCNC,CPTII,S$GLB, | Performed by: UROLOGY

## 2024-03-13 PROCEDURE — 3288F FALL RISK ASSESSMENT DOCD: CPT | Mod: HCNC,CPTII,S$GLB, | Performed by: UROLOGY

## 2024-03-13 PROCEDURE — 1126F AMNT PAIN NOTED NONE PRSNT: CPT | Mod: HCNC,CPTII,S$GLB, | Performed by: UROLOGY

## 2024-03-13 PROCEDURE — 36415 COLL VENOUS BLD VENIPUNCTURE: CPT | Mod: HCNC | Performed by: UROLOGY

## 2024-03-13 PROCEDURE — 1111F DSCHRG MED/CURRENT MED MERGE: CPT | Mod: HCNC,CPTII,S$GLB, | Performed by: UROLOGY

## 2024-03-13 PROCEDURE — 85025 COMPLETE CBC W/AUTO DIFF WBC: CPT | Mod: HCNC | Performed by: UROLOGY

## 2024-03-13 PROCEDURE — 99215 OFFICE O/P EST HI 40 MIN: CPT | Mod: HCNC,S$GLB,, | Performed by: UROLOGY

## 2024-03-13 PROCEDURE — 3079F DIAST BP 80-89 MM HG: CPT | Mod: HCNC,CPTII,S$GLB, | Performed by: UROLOGY

## 2024-03-13 PROCEDURE — 80053 COMPREHEN METABOLIC PANEL: CPT | Mod: HCNC | Performed by: UROLOGY

## 2024-03-13 PROCEDURE — 3008F BODY MASS INDEX DOCD: CPT | Mod: HCNC,CPTII,S$GLB, | Performed by: UROLOGY

## 2024-03-13 PROCEDURE — 3077F SYST BP >= 140 MM HG: CPT | Mod: HCNC,CPTII,S$GLB, | Performed by: UROLOGY

## 2024-03-13 PROCEDURE — 99999 PR PBB SHADOW E&M-EST. PATIENT-LVL V: CPT | Mod: PBBFAC,HCNC,, | Performed by: UROLOGY

## 2024-03-13 PROCEDURE — 1101F PT FALLS ASSESS-DOCD LE1/YR: CPT | Mod: HCNC,CPTII,S$GLB, | Performed by: UROLOGY

## 2024-03-13 RX ORDER — ACETAMINOPHEN 500 MG
1000 TABLET ORAL
Status: CANCELLED | OUTPATIENT
Start: 2024-03-13

## 2024-03-13 RX ORDER — CEFAZOLIN SODIUM 2 G/50ML
2 SOLUTION INTRAVENOUS
Status: CANCELLED | OUTPATIENT
Start: 2024-03-13

## 2024-03-13 RX ORDER — LIDOCAINE HYDROCHLORIDE 10 MG/ML
1 INJECTION, SOLUTION EPIDURAL; INFILTRATION; INTRACAUDAL; PERINEURAL ONCE
Status: CANCELLED | OUTPATIENT
Start: 2024-03-13 | End: 2024-03-13

## 2024-03-13 NOTE — PROGRESS NOTES
[place] - Urology   Clinic Note    SUBJECTIVE:     Chief Complaint   Patient presents with    Other     Renal Mass        Referral from: Judith Castro MD.    History of Present Illness:  Chely Kramer is a 73 y.o. female with PMH of Diverticulitis, who presents to clinic for renal mass.    Patient had incidental finding of 4.3cm LLP posterior renal mass concerning for RCC during hospitalization for pelvic abscess related to diverticulitis. She is s/p IR drainage of the pelvic abscess.     Denies hematuria. Former smoker, quit 30 years ago though continued to have second-hand smoke exposure at her job. She reports her sister had part of her kidney removed for a growth a few years ago, she is unsure of pathology.     Prior surgical history with ruptured appendicitis complicated by wound issues and eventual ventral hernia repair with mesh.      Patient works as a  on [employer].     Patient endorses no additional complaints at this time.    Past Medical History:   Diagnosis Date    Glaucoma        Past Surgical History:   Procedure Laterality Date    ADENOIDECTOMY      APPENDECTOMY      Ruptured    BREAST BIOPSY      BREAST CYST EXCISION      HERNIA REPAIR Right     Ventral    INCISION AND DRAINAGE OF HAND Left 2019    Procedure: INCISION AND DRAINAGE, HAND (ADD ON );  Surgeon: Sameer Evans MD;  Location: [facility] [place] OR;  Service: Orthopedics;  Laterality: Left;  AXILLARY BLOCK  (ADD ON )    NEEDLE LOCALIZATION N/A 2024    Procedure: ABSCESS DRAINAGE;  Surgeon: Fernando Thurman MD;  Location: [facility], [place] CATH LAB;  Service: Radiology;  Laterality: N/A;    TONSILLECTOMY         Family History   Problem Relation Age of Onset    Heart disease Father 74        CABG    Parkinsonism Brother     Mental illness Brother         schizophrenia       Social History     Tobacco Use    Smoking status: Former     Current packs/day: 0.00     Types: Cigarettes     Quit date: 5/3/1992     Years since quittin.8     "Smokeless tobacco: Never   Substance Use Topics    Alcohol use: Yes     Alcohol/week: 4.0 standard drinks of alcohol     Types: 4 Shots of liquor per week    Drug use: No       Current Outpatient Medications on File Prior to Visit   Medication Sig Dispense Refill    acetaminophen (TYLENOL) 325 MG tablet Take 2 tablets (650 mg total) by mouth every 6 (six) hours as needed for Pain.  0    ascorbic acid, vitamin C, (VITAMIN C) 500 MG tablet Take 2,500 mg by mouth once daily.      brimonidine 0.2% (ALPHAGAN) 0.2 % Drop Place 1 drop into both eyes 2 (two) times daily.      dicyclomine (BENTYL) 20 mg tablet Take 1 tablet (20 mg total) by mouth 3 (three) times daily as needed (abdominal cramping). 30 tablet 0    latanoprost 0.005 % ophthalmic solution Place 1 drop into both eyes every evening.      pantoprazole (PROTONIX) 40 MG tablet Take 1 tablet (40 mg total) by mouth once daily. 30 tablet 1    traZODone (DESYREL) 50 MG tablet Take 1/2-1 tablet nightly as needed for insomnia. 30 tablet 2     No current facility-administered medications on file prior to visit.       Review of patient's allergies indicates:   Allergen Reactions    Ciprofloxacin (bulk) Hives    Flagyl [metronidazole] Hives       Review of Systems:  A review of 10+ systems was conducted with pertinent positive and negative findings documented in HPI with all other systems reviewed and negative.    OBJECTIVE:     Estimated body mass index is 27.85 kg/m² as calculated from the following:    Height as of this encounter: 5' 1" (1.549 m).    Weight as of this encounter: 66.8 kg (147 lb 6 oz).    Vital Signs (Most Recent)  Vitals:    03/13/24 1439   BP: (!) 166/82   Pulse: 89       Physical Exam:  GENERAL: patient sitting comfortably  HEENT: normocephalic  NECK: supple, no JVD  PULM: normal chest rise, no increased WOB  HEART: non-diaphoretic  ABDO: soft, nondistended, nontender. RLQ surgical scar well healed  BACK: no CVA tenderness bilaterally  SKIN: warm, dry, " well perfused  EXT: no bruising or edema  NEURO: grossly normal with no focal deficits  PSYCH: appropriate mood and affect    Genitourinary Exam:  deferred    Lab Results   Component Value Date    BUN 8 02/25/2024    CREATININE 0.7 02/25/2024    WBC 8.10 02/25/2024    HGB 12.2 02/25/2024    HCT 36.9 (L) 02/25/2024     02/25/2024    AST 15 02/24/2024    ALT 13 02/24/2024    ALKPHOS 78 02/24/2024    ALBUMIN 3.2 (L) 02/24/2024    HGBA1C 5.1 01/18/2024    INR 1.1 02/22/2024        Imaging:  I have personally reviewed all relevant imaging studies.    Results for orders placed or performed during the hospital encounter of 02/21/24 (from the past 2160 hour(s))   CT Abdomen Pelvis  Without Contrast    Narrative    EXAMINATION:  CT ABDOMEN PELVIS WITHOUT CONTRAST    CLINICAL HISTORY:  Abdominal abscess/infection suspected;    TECHNIQUE:  Low dose axial images, sagittal and coronal reformations were obtained from the lung bases to the pubic symphysis.  Oral contrast was not administered.    COMPARISON:  02/21/2024    FINDINGS:  Limited evaluation of the structures at the base of the chest show no concerning masses or nodules.    The liver is normal in size.  No solid mass noting lack of IV contrast.  No biliary ductal dilatation.  Gallbladder is unremarkable.    Spleen, adrenal glands, and pancreas show no focal abnormalities noting lack of IV contrast.    The bilateral kidneys are unchanged in size and position.  The posterior lower pole mass of the left kidney is unchanged from recent prior exam, not as well evaluated today without IV contrast.  No hydronephrosis or nephrolithiasis.  The urinary bladder shows no focal wall thickening.    On going sigmoid diverticulitis.  No evidence of obstruction.  There is a left trans gluteal approach percutaneous drain in place with apparent resolution of the previously identified complex abscess.  There remains moderate amount of inflammatory stranding in the dependent pelvis.   Small hiatal hernia is present.  There is no free gas.  No additional organized or drainable fluid collections.  No significant lymphadenopathy.  Surgical changes to the right abdominal wall unchanged from prior.    Vascular structures show mild atherosclerosis without aneurysm.  Degenerative change of the spine without an acute fracture or destructive osseous lesion.      Impression    Apparent resolution of the previously identified deep pelvic abscess with percutaneous drain in place.    Ongoing sigmoid diverticulitis.  There is inflammatory stranding in the dependent pelvis but no additional drainable fluid collection.    Left renal mass better evaluated on the recent contrasted exam.  Additional findings as above stable from prior.      Electronically signed by: Michael Brown MD  Date:    02/23/2024  Time:    16:50   CT Abdomen Pelvis With IV Contrast NO Oral Contrast    Narrative    EXAMINATION:  CT ABDOMEN PELVIS WITH IV CONTRAST    CLINICAL HISTORY:  LLQ abdominal pain;    TECHNIQUE:  Low dose axial images, sagittal and coronal reformations were obtained from the lung bases to the pubic symphysis following the IV administration of 75 mL of Omnipaque 350 .  Oral contrast was not given.    COMPARISON:  02/15/2024    FINDINGS:  Heart is mildly enlarged.  Mild basilar atelectatic changes versus scarring.    Small hiatal hernia.  Otherwise, stomach shows no significant abnormalities.    Liver is homogeneous in attenuation with no focal liver lesions.  Spleen, pancreas and right adrenal gland shows no significant abnormalities.  Mild left adrenal gland thickening without discrete nodule.    There is a 2.4 cm right renal hypodensity likely a cyst.  Minimal dilatation of the right collecting system.  There is a solid-appearing mass involving the left kidney.  This extends into the renal pelvis but also has an exophytic component.  This measures 4.1 x 3.3 x 4.3 cm.  No left-sided hydronephrosis.  Left renal vein  appears patent.    Urinary bladder and uterus are displaced anteriorly by the pelvic abscess.    There is extensive colonic diverticulosis with wall thickening involving the sigmoid colon likely relating to chronic diverticulitis.  There are inflammatory changes in the pelvis with a 5.1 x 3.4 x 5.0 cm rim enhancing collection in the pelvis worrisome for abscess.  Appendix shows no significant abnormalities.  No free air.  There are postoperative changes of right lower abdominal hernia repair.    Abdominal aorta is normal in caliber with moderate atherosclerosis.  Several normal sized periaortic lymph nodes.  No abnormal lymph node enlargement.    Osseous structures show degenerative changes.  No acute osseous abnormalities.      Impression    Extensive colonic diverticulosis with findings worrisome for acute on chronic sigmoid diverticulitis with focal abscess in the deep pelvis.    4.3 cm solid left renal mass highly worrisome for renal cell neoplasm.  Recommend close follow-up with urology.    This report was flagged in Epic as abnormal.      Electronically signed by: Christopher Angeles MD  Date:    02/21/2024  Time:    13:05     No results found for this or any previous visit (from the past 2160 hour(s)).  CT Abdomen Pelvis  Without Contrast  Narrative: EXAMINATION:  CT ABDOMEN PELVIS WITHOUT CONTRAST    CLINICAL HISTORY:  Abdominal abscess/infection suspected;    TECHNIQUE:  Low dose axial images, sagittal and coronal reformations were obtained from the lung bases to the pubic symphysis.  Oral contrast was not administered.    COMPARISON:  02/21/2024    FINDINGS:  Limited evaluation of the structures at the base of the chest show no concerning masses or nodules.    The liver is normal in size.  No solid mass noting lack of IV contrast.  No biliary ductal dilatation.  Gallbladder is unremarkable.    Spleen, adrenal glands, and pancreas show no focal abnormalities noting lack of IV contrast.    The bilateral kidneys  "are unchanged in size and position.  The posterior lower pole mass of the left kidney is unchanged from recent prior exam, not as well evaluated today without IV contrast.  No hydronephrosis or nephrolithiasis.  The urinary bladder shows no focal wall thickening.    On going sigmoid diverticulitis.  No evidence of obstruction.  There is a left trans gluteal approach percutaneous drain in place with apparent resolution of the previously identified complex abscess.  There remains moderate amount of inflammatory stranding in the dependent pelvis.  Small hiatal hernia is present.  There is no free gas.  No additional organized or drainable fluid collections.  No significant lymphadenopathy.  Surgical changes to the right abdominal wall unchanged from prior.    Vascular structures show mild atherosclerosis without aneurysm.  Degenerative change of the spine without an acute fracture or destructive osseous lesion.  Impression: Apparent resolution of the previously identified deep pelvic abscess with percutaneous drain in place.    Ongoing sigmoid diverticulitis.  There is inflammatory stranding in the dependent pelvis but no additional drainable fluid collection.    Left renal mass better evaluated on the recent contrasted exam.  Additional findings as above stable from prior.    Electronically signed by: Michael Brown MD  Date:    02/23/2024  Time:    16:50  Interventional Radiology  Procedure performed in the Invasive Lab    - See Procedure Log link below for nursing documentation    - See OpNote on Surgeries Tab for physician findings    - See Imaging Tab for radiologist dictation       PSA:  No results found for: "PSA", "PSADIAG", "PSATOTAL", "PSAFREE"    Testosterone:  No results found for: "TOTALTESTOST", "TESTOSTERONE"     ASSESSMENT     1. Renal mass        PLAN:      Long talk about renal mass and it's management.  Reviewed images.Discussed options including active surveillance, biopsy, minimally invasive " techniques including HFRA, cryo. Discussed open and laparascopic surgical approaches. Discussed partial and radical nephrectomy. Discussed surgery preparation, surgery, recuperation, recovery, exercise restrictions. Discussed risks, benefits, and complications. Answered questions and addressed their concerns.  Patient wishes to proceed with surgery.   CXR.   We discussed with her complicated abdominal history that she may have a significant amount of intra-abdominal adhesions and we should wait at least 8 weeks from her diverticulitis episode in abscess drain placement in order to allow things to cool down.  We also discussed that laparoscopic and robotic surgery could be complicated by intra-abdominal adhesions and require an open conversion.  We will plan for left robotic radical nephrectomy on 4/24.  We discussed partial nephrectomy and the potential for complications with that and the patient elected for radical nephrectomy.    Temo Hodge MD  Urology  [facility]  [facility]    Disclaimer: This note has been generated using voice-recognition software. There may be typographical errors that have been missed during proof-reading.

## 2024-03-15 ENCOUNTER — OFFICE VISIT (OUTPATIENT)
Dept: SURGERY | Facility: CLINIC | Age: 74
End: 2024-03-15
Payer: MEDICARE

## 2024-03-15 VITALS
DIASTOLIC BLOOD PRESSURE: 70 MMHG | HEIGHT: 61 IN | SYSTOLIC BLOOD PRESSURE: 149 MMHG | RESPIRATION RATE: 19 BRPM | BODY MASS INDEX: 28.67 KG/M2 | HEART RATE: 91 BPM | OXYGEN SATURATION: 95 % | WEIGHT: 151.88 LBS

## 2024-03-15 DIAGNOSIS — K57.20 DIVERTICULITIS OF LARGE INTESTINE WITH ABSCESS WITHOUT BLEEDING: Primary | ICD-10-CM

## 2024-03-15 PROCEDURE — 1101F PT FALLS ASSESS-DOCD LE1/YR: CPT | Mod: HCNC,CPTII,S$GLB, | Performed by: SURGERY

## 2024-03-15 PROCEDURE — 99999 PR PBB SHADOW E&M-EST. PATIENT-LVL III: CPT | Mod: PBBFAC,HCNC,, | Performed by: SURGERY

## 2024-03-15 PROCEDURE — 1159F MED LIST DOCD IN RCRD: CPT | Mod: HCNC,CPTII,S$GLB, | Performed by: SURGERY

## 2024-03-15 PROCEDURE — 3044F HG A1C LEVEL LT 7.0%: CPT | Mod: HCNC,CPTII,S$GLB, | Performed by: SURGERY

## 2024-03-15 PROCEDURE — 3078F DIAST BP <80 MM HG: CPT | Mod: HCNC,CPTII,S$GLB, | Performed by: SURGERY

## 2024-03-15 PROCEDURE — 1111F DSCHRG MED/CURRENT MED MERGE: CPT | Mod: HCNC,CPTII,S$GLB, | Performed by: SURGERY

## 2024-03-15 PROCEDURE — 3077F SYST BP >= 140 MM HG: CPT | Mod: HCNC,CPTII,S$GLB, | Performed by: SURGERY

## 2024-03-15 PROCEDURE — 1126F AMNT PAIN NOTED NONE PRSNT: CPT | Mod: HCNC,CPTII,S$GLB, | Performed by: SURGERY

## 2024-03-15 PROCEDURE — 3008F BODY MASS INDEX DOCD: CPT | Mod: HCNC,CPTII,S$GLB, | Performed by: SURGERY

## 2024-03-15 PROCEDURE — 3288F FALL RISK ASSESSMENT DOCD: CPT | Mod: HCNC,CPTII,S$GLB, | Performed by: SURGERY

## 2024-03-15 PROCEDURE — 99214 OFFICE O/P EST MOD 30 MIN: CPT | Mod: HCNC,S$GLB,, | Performed by: SURGERY

## 2024-03-15 NOTE — PROGRESS NOTES
CRS Office Visit History and Physical    Referring Md:   Boyd An Md  [address] [place] [address]    SUBJECTIVE:     Chief Complaint: diverticulitis     History of Present Illness:  The patient is a new patient to this practice.   Course is as follows:  Chely Kramer is a 73 y.o. female presents for hospital follow up after admission for perforated diverticulitis requiring IR drain placement.  She has since recovered and drain has been removed.  Feeling better, still not 100%.  Remains on a low fiber diet.  Reports that she has had many episodes of diverticulitis, she thinks up to 20.  Reports increasing frequency of events, more than 1 episode per year.  Last colonoscopy 2022 with 1 TA.  + Diverticulosis.  No family history of colon cancer     Last Colonoscopy: 2022    Review of patient's allergies indicates:   Allergen Reactions    Ciprofloxacin (bulk) Hives    Flagyl [metronidazole] Hives       Past Medical History:   Diagnosis Date    Glaucoma      Past Surgical History:   Procedure Laterality Date    ADENOIDECTOMY      APPENDECTOMY      Ruptured    BREAST BIOPSY      BREAST CYST EXCISION      HERNIA REPAIR Right     Ventral    INCISION AND DRAINAGE OF HAND Left 2019    Procedure: INCISION AND DRAINAGE, HAND (ADD ON );  Surgeon: Sameer Evans MD;  Location: [facility] OR;  Service: Orthopedics;  Laterality: Left;  AXILLARY BLOCK  (ADD ON )    NEEDLE LOCALIZATION N/A 2024    Procedure: ABSCESS DRAINAGE;  Surgeon: Fernando Thurman MD;  Location: [facility] CATH LAB;  Service: Radiology;  Laterality: N/A;    TONSILLECTOMY       Family History   Problem Relation Age of Onset    Heart disease Father 74        CABG    Parkinsonism Brother     Mental illness Brother         schizophrenia     Social History     Tobacco Use    Smoking status: Former     Current packs/day: 0.00     Types: Cigarettes     Quit date: 5/3/1992     Years since quittin.8    Smokeless tobacco: Never  "  Substance Use Topics    Alcohol use: Yes     Alcohol/week: 4.0 standard drinks of alcohol     Types: 4 Shots of liquor per week    Drug use: No        Review of Systems:  Review of Systems   All other systems reviewed and are negative.    OBJECTIVE:     Vital Signs (Most Recent)  BP (!) 149/70 (BP Location: Left arm, Patient Position: Sitting)   Pulse 91   Resp 19   Ht 5' 0.98" (1.549 m)   Wt 68.9 kg (151 lb 14.4 oz)   SpO2 95%   BMI 28.72 kg/m²     Physical Exam:  General: 73 y.o. female in no distress   Neuro: alert and oriented x 4.  Moves all extremities.     HEENT: normocephalic, atraumatic, PERRL, EOMI   Respiratory: respirations are even and unlabored  Cardiac: regular rate and rhythm  Abdomen: Large broad based McBurney's incision in RLQ  Extremities: Warm dry and intact  Skin: no rashes    Labs: NA    Imaging:   Impression:     Apparent resolution of the previously identified deep pelvic abscess with percutaneous drain in place.     Ongoing sigmoid diverticulitis.  There is inflammatory stranding in the dependent pelvis but no additional drainable fluid collection.     Left renal mass better evaluated on the recent contrasted exam.  Additional findings as above stable from prior.        Electronically signed by: Michael Brown MD  Date:                                            02/23/2024  Time:                                           16:50      ASSESSMENT/PLAN:     There are no diagnoses linked to this encounter.    73 y.o. female with recurrent diverticulitis, most recently complicated by perforation and abscess requiring IR drain placement     - Labs and imaging personally reviewed. Most recent albumin improved to 3.6.  Creatinine 0.7.  Imaging confirmed perforated diverticulitis with abscess as well as renal mass concerning for RCC  - Patient overall improving, okay to resume diet as tolerated.    - Prior endoscopy images personally reviewed and confirm diverticulosis.    - She has had " follow up with Dr. Hodge in urology with plans for nephrectomy in April.   - We discussed surgical management of diverticulitis once she has recovered from her nephrectomy.  We discussed rationale for elective resection, including increased chance of minimally invasive surgery and decreased risk of ostomy.  We discussed recurrence rates following surgery.   - Patient will call clinic if symptoms return before her follow up in May.    -     Tracee Thurman MD  Staff Surgeon  Colon & Rectal Surgery

## 2024-04-15 ENCOUNTER — TELEPHONE (OUTPATIENT)
Dept: UROLOGY | Facility: CLINIC | Age: 74
End: 2024-04-15
Payer: MEDICARE

## 2024-04-16 ENCOUNTER — TELEPHONE (OUTPATIENT)
Dept: UROLOGY | Facility: CLINIC | Age: 74
End: 2024-04-16
Payer: MEDICARE

## 2024-04-16 NOTE — TELEPHONE ENCOUNTER
----- Message from Neena Paige sent at 4/16/2024  8:04 AM CDT -----  Needs advice from nurse:      Who Called:pt  Regarding:was told she needs to reschedule upcoming pre op  Would the patient rather a call back or [facility]IA [facility]?  Best Call Back number:[phone]  Additional Info:

## 2024-04-18 ENCOUNTER — OFFICE VISIT (OUTPATIENT)
Dept: UROLOGY | Facility: CLINIC | Age: 74
End: 2024-04-18
Payer: MEDICARE

## 2024-04-18 VITALS
WEIGHT: 145.81 LBS | DIASTOLIC BLOOD PRESSURE: 74 MMHG | HEART RATE: 74 BPM | SYSTOLIC BLOOD PRESSURE: 136 MMHG | HEIGHT: 61 IN | BODY MASS INDEX: 27.53 KG/M2

## 2024-04-18 DIAGNOSIS — N28.89 LEFT RENAL MASS: Primary | ICD-10-CM

## 2024-04-18 PROCEDURE — 3288F FALL RISK ASSESSMENT DOCD: CPT | Mod: CPTII,S$GLB,, | Performed by: UROLOGY

## 2024-04-18 PROCEDURE — 3078F DIAST BP <80 MM HG: CPT | Mod: CPTII,S$GLB,, | Performed by: UROLOGY

## 2024-04-18 PROCEDURE — 99215 OFFICE O/P EST HI 40 MIN: CPT | Mod: S$GLB,,, | Performed by: UROLOGY

## 2024-04-18 PROCEDURE — 99999 PR PBB SHADOW E&M-EST. PATIENT-LVL III: CPT | Mod: PBBFAC,,, | Performed by: UROLOGY

## 2024-04-18 PROCEDURE — 3075F SYST BP GE 130 - 139MM HG: CPT | Mod: CPTII,S$GLB,, | Performed by: UROLOGY

## 2024-04-18 PROCEDURE — 1159F MED LIST DOCD IN RCRD: CPT | Mod: CPTII,S$GLB,, | Performed by: UROLOGY

## 2024-04-18 PROCEDURE — 3044F HG A1C LEVEL LT 7.0%: CPT | Mod: CPTII,S$GLB,, | Performed by: UROLOGY

## 2024-04-18 PROCEDURE — 1160F RVW MEDS BY RX/DR IN RCRD: CPT | Mod: CPTII,S$GLB,, | Performed by: UROLOGY

## 2024-04-18 PROCEDURE — 1126F AMNT PAIN NOTED NONE PRSNT: CPT | Mod: CPTII,S$GLB,, | Performed by: UROLOGY

## 2024-04-18 PROCEDURE — 1101F PT FALLS ASSESS-DOCD LE1/YR: CPT | Mod: CPTII,S$GLB,, | Performed by: UROLOGY

## 2024-04-18 NOTE — H&P (VIEW-ONLY)
[facility] - Urology   Clinic Note    SUBJECTIVE:     Pre-op visit.      Referral from: No ref. provider found.    History of Present Illness:  Chely Kramer is a 73 y.o. female with PMH of Diverticulitis, who presents to clinic for renal mass.    Patient had incidental finding of 4.3cm LLP posterior renal mass concerning for RCC during hospitalization for pelvic abscess related to diverticulitis. She is s/p IR drainage of the pelvic abscess.     Denies hematuria. Former smoker, quit 30 years ago though continued to have second-hand smoke exposure at her job. She reports her sister had part of her kidney removed for a growth a few years ago, she is unsure of pathology.     Prior surgical history with ruptured appendicitis complicated by wound issues and eventual ventral hernia repair with mesh.      Patient works as a  on [employer].     2024  Here for preop      Patient endorses no additional complaints at this time.    Past Medical History:   Diagnosis Date    Glaucoma        Past Surgical History:   Procedure Laterality Date    ADENOIDECTOMY      APPENDECTOMY      Ruptured    BREAST BIOPSY      BREAST CYST EXCISION      HERNIA REPAIR Right     Ventral    INCISION AND DRAINAGE OF HAND Left 2019    Procedure: INCISION AND DRAINAGE, HAND (ADD ON );  Surgeon: Sameer Evans MD;  Location: [facility] OR;  Service: Orthopedics;  Laterality: Left;  AXILLARY BLOCK  (ADD ON )    NEEDLE LOCALIZATION N/A 2024    Procedure: ABSCESS DRAINAGE;  Surgeon: Fernando Thurman MD;  Location: [facility] CATH LAB;  Service: Radiology;  Laterality: N/A;    TONSILLECTOMY         Family History   Problem Relation Name Age of Onset    Heart disease Father  74        CABG    Parkinsonism Brother      Mental illness Brother          schizophrenia       Social History     Tobacco Use    Smoking status: Former     Current packs/day: 0.00     Types: Cigarettes     Quit date: 5/3/1992     Years since quittin.9     "Smokeless tobacco: Never   Substance Use Topics    Alcohol use: Yes     Alcohol/week: 4.0 standard drinks of alcohol     Types: 4 Shots of liquor per week    Drug use: No       Current Outpatient Medications on File Prior to Visit   Medication Sig Dispense Refill    acetaminophen (TYLENOL) 325 MG tablet Take 2 tablets (650 mg total) by mouth every 6 (six) hours as needed for Pain.  0    ascorbic acid, vitamin C, (VITAMIN C) 500 MG tablet Take 2,500 mg by mouth once daily.      brimonidine 0.2% (ALPHAGAN) 0.2 % Drop Place 1 drop into both eyes 2 (two) times daily.      latanoprost 0.005 % ophthalmic solution Place 1 drop into both eyes every evening.      traZODone (DESYREL) 50 MG tablet Take 1/2-1 tablet nightly as needed for insomnia. 30 tablet 2    pantoprazole (PROTONIX) 40 MG tablet Take 1 tablet (40 mg total) by mouth once daily. 30 tablet 1     No current facility-administered medications on file prior to visit.       Review of patient's allergies indicates:   Allergen Reactions    Ciprofloxacin (bulk) Hives    Flagyl [metronidazole] Hives       Review of Systems:  A review of 10+ systems was conducted with pertinent positive and negative findings documented in HPI with all other systems reviewed and negative.    OBJECTIVE:     Estimated body mass index is 27.56 kg/m² as calculated from the following:    Height as of this encounter: 5' 1" (1.549 m).    Weight as of this encounter: 66.2 kg (145 lb 13.4 oz).    Vital Signs (Most Recent)  Vitals:    04/18/24 0837   BP: 136/74   Pulse: 74         Physical Exam:  GENERAL: patient sitting comfortably  HEENT: normocephalic  NECK: supple, no JVD  PULM: normal chest rise, no increased WOB  HEART: non-diaphoretic  ABDO: soft, nondistended, nontender. RLQ surgical scar well healed  BACK: no CVA tenderness bilaterally  SKIN: warm, dry, well perfused  EXT: no bruising or edema  NEURO: grossly normal with no focal deficits  PSYCH: appropriate mood and " affect    Genitourinary Exam:  deferred    Lab Results   Component Value Date    BUN 14 03/13/2024    CREATININE 0.7 03/13/2024    WBC 9.07 03/13/2024    HGB 12.6 03/13/2024    HCT 37.8 03/13/2024     03/13/2024    AST 15 03/13/2024    ALT 15 03/13/2024    ALKPHOS 96 03/13/2024    ALBUMIN 3.6 03/13/2024    HGBA1C 5.1 01/18/2024    INR 1.1 02/22/2024        Imaging:  I have personally reviewed all relevant imaging studies.    Results for orders placed or performed during the hospital encounter of 02/21/24 (from the past 2160 hour(s))   CT Abdomen Pelvis  Without Contrast    Narrative    EXAMINATION:  CT ABDOMEN PELVIS WITHOUT CONTRAST    CLINICAL HISTORY:  Abdominal abscess/infection suspected;    TECHNIQUE:  Low dose axial images, sagittal and coronal reformations were obtained from the lung bases to the pubic symphysis.  Oral contrast was not administered.    COMPARISON:  02/21/2024    FINDINGS:  Limited evaluation of the structures at the base of the chest show no concerning masses or nodules.    The liver is normal in size.  No solid mass noting lack of IV contrast.  No biliary ductal dilatation.  Gallbladder is unremarkable.    Spleen, adrenal glands, and pancreas show no focal abnormalities noting lack of IV contrast.    The bilateral kidneys are unchanged in size and position.  The posterior lower pole mass of the left kidney is unchanged from recent prior exam, not as well evaluated today without IV contrast.  No hydronephrosis or nephrolithiasis.  The urinary bladder shows no focal wall thickening.    On going sigmoid diverticulitis.  No evidence of obstruction.  There is a left trans gluteal approach percutaneous drain in place with apparent resolution of the previously identified complex abscess.  There remains moderate amount of inflammatory stranding in the dependent pelvis.  Small hiatal hernia is present.  There is no free gas.  No additional organized or drainable fluid collections.  No  significant lymphadenopathy.  Surgical changes to the right abdominal wall unchanged from prior.    Vascular structures show mild atherosclerosis without aneurysm.  Degenerative change of the spine without an acute fracture or destructive osseous lesion.      Impression    Apparent resolution of the previously identified deep pelvic abscess with percutaneous drain in place.    Ongoing sigmoid diverticulitis.  There is inflammatory stranding in the dependent pelvis but no additional drainable fluid collection.    Left renal mass better evaluated on the recent contrasted exam.  Additional findings as above stable from prior.      Electronically signed by: Michael Brown MD  Date:    02/23/2024  Time:    16:50   CT Abdomen Pelvis With IV Contrast NO Oral Contrast    Narrative    EXAMINATION:  CT ABDOMEN PELVIS WITH IV CONTRAST    CLINICAL HISTORY:  LLQ abdominal pain;    TECHNIQUE:  Low dose axial images, sagittal and coronal reformations were obtained from the lung bases to the pubic symphysis following the IV administration of 75 mL of Omnipaque 350 .  Oral contrast was not given.    COMPARISON:  02/15/2024    FINDINGS:  Heart is mildly enlarged.  Mild basilar atelectatic changes versus scarring.    Small hiatal hernia.  Otherwise, stomach shows no significant abnormalities.    Liver is homogeneous in attenuation with no focal liver lesions.  Spleen, pancreas and right adrenal gland shows no significant abnormalities.  Mild left adrenal gland thickening without discrete nodule.    There is a 2.4 cm right renal hypodensity likely a cyst.  Minimal dilatation of the right collecting system.  There is a solid-appearing mass involving the left kidney.  This extends into the renal pelvis but also has an exophytic component.  This measures 4.1 x 3.3 x 4.3 cm.  No left-sided hydronephrosis.  Left renal vein appears patent.    Urinary bladder and uterus are displaced anteriorly by the pelvic abscess.    There is extensive  colonic diverticulosis with wall thickening involving the sigmoid colon likely relating to chronic diverticulitis.  There are inflammatory changes in the pelvis with a 5.1 x 3.4 x 5.0 cm rim enhancing collection in the pelvis worrisome for abscess.  Appendix shows no significant abnormalities.  No free air.  There are postoperative changes of right lower abdominal hernia repair.    Abdominal aorta is normal in caliber with moderate atherosclerosis.  Several normal sized periaortic lymph nodes.  No abnormal lymph node enlargement.    Osseous structures show degenerative changes.  No acute osseous abnormalities.      Impression    Extensive colonic diverticulosis with findings worrisome for acute on chronic sigmoid diverticulitis with focal abscess in the deep pelvis.    4.3 cm solid left renal mass highly worrisome for renal cell neoplasm.  Recommend close follow-up with urology.    This report was flagged in Epic as abnormal.      Electronically signed by: Christopher Angeles MD  Date:    02/21/2024  Time:    13:05     No results found for this or any previous visit (from the past 2160 hour(s)).  CT Abdomen Pelvis  Without Contrast  Narrative: EXAMINATION:  CT ABDOMEN PELVIS WITHOUT CONTRAST    CLINICAL HISTORY:  Abdominal abscess/infection suspected;    TECHNIQUE:  Low dose axial images, sagittal and coronal reformations were obtained from the lung bases to the pubic symphysis.  Oral contrast was not administered.    COMPARISON:  02/21/2024    FINDINGS:  Limited evaluation of the structures at the base of the chest show no concerning masses or nodules.    The liver is normal in size.  No solid mass noting lack of IV contrast.  No biliary ductal dilatation.  Gallbladder is unremarkable.    Spleen, adrenal glands, and pancreas show no focal abnormalities noting lack of IV contrast.    The bilateral kidneys are unchanged in size and position.  The posterior lower pole mass of the left kidney is unchanged from recent prior  exam, not as well evaluated today without IV contrast.  No hydronephrosis or nephrolithiasis.  The urinary bladder shows no focal wall thickening.    On going sigmoid diverticulitis.  No evidence of obstruction.  There is a left trans gluteal approach percutaneous drain in place with apparent resolution of the previously identified complex abscess.  There remains moderate amount of inflammatory stranding in the dependent pelvis.  Small hiatal hernia is present.  There is no free gas.  No additional organized or drainable fluid collections.  No significant lymphadenopathy.  Surgical changes to the right abdominal wall unchanged from prior.    Vascular structures show mild atherosclerosis without aneurysm.  Degenerative change of the spine without an acute fracture or destructive osseous lesion.  Impression: Apparent resolution of the previously identified deep pelvic abscess with percutaneous drain in place.    Ongoing sigmoid diverticulitis.  There is inflammatory stranding in the dependent pelvis but no additional drainable fluid collection.    Left renal mass better evaluated on the recent contrasted exam.  Additional findings as above stable from prior.    Electronically signed by: Michael Brown MD  Date:    02/23/2024  Time:    16:50  Interventional Radiology  Procedure performed in the Invasive Lab    - See Procedure Log link below for nursing documentation    - See OpNote on Surgeries Tab for physician findings    - See Imaging Tab for radiologist dictation       ASSESSMENT     1. Left renal mass          PLAN:      Long talk about renal mass and it's management.  Reviewed images.Discussed options including active surveillance, biopsy, minimally invasive techniques including HFRA, cryo. Discussed open and laparascopic surgical approaches. Discussed partial and radical nephrectomy. Discussed surgery preparation, surgery, recuperation, recovery, exercise restrictions. Discussed risks, benefits, and  complications. Answered questions and addressed their concerns.  Patient wishes to proceed with surgery.   CXR.   We discussed with her complicated abdominal history that she may have a significant amount of intra-abdominal adhesions and we should wait at least 8 weeks from her diverticulitis episode in abscess drain placement in order to allow things to cool down.  We also discussed that laparoscopic and robotic surgery could be complicated by intra-abdominal adhesions and require an open conversion.  We will plan for left robotic radical nephrectomy on 4/24.  We discussed partial nephrectomy and the potential for complications with that and the patient elected for radical nephrectomy.  UCx    Temo Hodge MD  Urology  [facility]  [facility]    Disclaimer: This note has been generated using voice-recognition software. There may be typographical errors that have been missed during proof-reading.

## 2024-04-18 NOTE — PROGRESS NOTES
[place] - Urology   Clinic Note    SUBJECTIVE:     Pre-op visit.      Referral from: No ref. provider found.    History of Present Illness:  Chely Kramer is a 73 y.o. female with PMH of Diverticulitis, who presents to clinic for renal mass.    Patient had incidental finding of 4.3cm LLP posterior renal mass concerning for RCC during hospitalization for pelvic abscess related to diverticulitis. She is s/p IR drainage of the pelvic abscess.     Denies hematuria. Former smoker, quit 30 years ago though continued to have second-hand smoke exposure at her job. She reports her sister had part of her kidney removed for a growth a few years ago, she is unsure of pathology.     Prior surgical history with ruptured appendicitis complicated by wound issues and eventual ventral hernia repair with mesh.      Patient works as a  on [employer].     2024  Here for preop      Patient endorses no additional complaints at this time.    Past Medical History:   Diagnosis Date    Glaucoma        Past Surgical History:   Procedure Laterality Date    ADENOIDECTOMY      APPENDECTOMY      Ruptured    BREAST BIOPSY      BREAST CYST EXCISION      HERNIA REPAIR Right     Ventral    INCISION AND DRAINAGE OF HAND Left 2019    Procedure: INCISION AND DRAINAGE, HAND (ADD ON );  Surgeon: Sameer Evans MD;  Location: [facility] OR;  Service: Orthopedics;  Laterality: Left;  AXILLARY BLOCK  (ADD ON )    NEEDLE LOCALIZATION N/A 2024    Procedure: ABSCESS DRAINAGE;  Surgeon: Fernando Thurman MD;  Location: [facility] CATH LAB;  Service: Radiology;  Laterality: N/A;    TONSILLECTOMY         Family History   Problem Relation Name Age of Onset    Heart disease Father  74        CABG    Parkinsonism Brother      Mental illness Brother          schizophrenia       Social History     Tobacco Use    Smoking status: Former     Current packs/day: 0.00     Types: Cigarettes     Quit date: 5/3/1992     Years since quittin.9     "Smokeless tobacco: Never   Substance Use Topics    Alcohol use: Yes     Alcohol/week: 4.0 standard drinks of alcohol     Types: 4 Shots of liquor per week    Drug use: No       Current Outpatient Medications on File Prior to Visit   Medication Sig Dispense Refill    acetaminophen (TYLENOL) 325 MG tablet Take 2 tablets (650 mg total) by mouth every 6 (six) hours as needed for Pain.  0    ascorbic acid, vitamin C, (VITAMIN C) 500 MG tablet Take 2,500 mg by mouth once daily.      brimonidine 0.2% (ALPHAGAN) 0.2 % Drop Place 1 drop into both eyes 2 (two) times daily.      latanoprost 0.005 % ophthalmic solution Place 1 drop into both eyes every evening.      traZODone (DESYREL) 50 MG tablet Take 1/2-1 tablet nightly as needed for insomnia. 30 tablet 2    pantoprazole (PROTONIX) 40 MG tablet Take 1 tablet (40 mg total) by mouth once daily. 30 tablet 1     No current facility-administered medications on file prior to visit.       Review of patient's allergies indicates:   Allergen Reactions    Ciprofloxacin (bulk) Hives    Flagyl [metronidazole] Hives       Review of Systems:  A review of 10+ systems was conducted with pertinent positive and negative findings documented in HPI with all other systems reviewed and negative.    OBJECTIVE:     Estimated body mass index is 27.56 kg/m² as calculated from the following:    Height as of this encounter: 5' 1" (1.549 m).    Weight as of this encounter: 66.2 kg (145 lb 13.4 oz).    Vital Signs (Most Recent)  Vitals:    04/18/24 0837   BP: 136/74   Pulse: 74         Physical Exam:  GENERAL: patient sitting comfortably  HEENT: normocephalic  NECK: supple, no JVD  PULM: normal chest rise, no increased WOB  HEART: non-diaphoretic  ABDO: soft, nondistended, nontender. RLQ surgical scar well healed  BACK: no CVA tenderness bilaterally  SKIN: warm, dry, well perfused  EXT: no bruising or edema  NEURO: grossly normal with no focal deficits  PSYCH: appropriate mood and " affect    Genitourinary Exam:  deferred    Lab Results   Component Value Date    BUN 14 03/13/2024    CREATININE 0.7 03/13/2024    WBC 9.07 03/13/2024    HGB 12.6 03/13/2024    HCT 37.8 03/13/2024     03/13/2024    AST 15 03/13/2024    ALT 15 03/13/2024    ALKPHOS 96 03/13/2024    ALBUMIN 3.6 03/13/2024    HGBA1C 5.1 01/18/2024    INR 1.1 02/22/2024        Imaging:  I have personally reviewed all relevant imaging studies.    Results for orders placed or performed during the hospital encounter of 02/21/24 (from the past 2160 hour(s))   CT Abdomen Pelvis  Without Contrast    Narrative    EXAMINATION:  CT ABDOMEN PELVIS WITHOUT CONTRAST    CLINICAL HISTORY:  Abdominal abscess/infection suspected;    TECHNIQUE:  Low dose axial images, sagittal and coronal reformations were obtained from the lung bases to the pubic symphysis.  Oral contrast was not administered.    COMPARISON:  02/21/2024    FINDINGS:  Limited evaluation of the structures at the base of the chest show no concerning masses or nodules.    The liver is normal in size.  No solid mass noting lack of IV contrast.  No biliary ductal dilatation.  Gallbladder is unremarkable.    Spleen, adrenal glands, and pancreas show no focal abnormalities noting lack of IV contrast.    The bilateral kidneys are unchanged in size and position.  The posterior lower pole mass of the left kidney is unchanged from recent prior exam, not as well evaluated today without IV contrast.  No hydronephrosis or nephrolithiasis.  The urinary bladder shows no focal wall thickening.    On going sigmoid diverticulitis.  No evidence of obstruction.  There is a left trans gluteal approach percutaneous drain in place with apparent resolution of the previously identified complex abscess.  There remains moderate amount of inflammatory stranding in the dependent pelvis.  Small hiatal hernia is present.  There is no free gas.  No additional organized or drainable fluid collections.  No  significant lymphadenopathy.  Surgical changes to the right abdominal wall unchanged from prior.    Vascular structures show mild atherosclerosis without aneurysm.  Degenerative change of the spine without an acute fracture or destructive osseous lesion.      Impression    Apparent resolution of the previously identified deep pelvic abscess with percutaneous drain in place.    Ongoing sigmoid diverticulitis.  There is inflammatory stranding in the dependent pelvis but no additional drainable fluid collection.    Left renal mass better evaluated on the recent contrasted exam.  Additional findings as above stable from prior.      Electronically signed by: Michael Brown MD  Date:    02/23/2024  Time:    16:50   CT Abdomen Pelvis With IV Contrast NO Oral Contrast    Narrative    EXAMINATION:  CT ABDOMEN PELVIS WITH IV CONTRAST    CLINICAL HISTORY:  LLQ abdominal pain;    TECHNIQUE:  Low dose axial images, sagittal and coronal reformations were obtained from the lung bases to the pubic symphysis following the IV administration of 75 mL of Omnipaque 350 .  Oral contrast was not given.    COMPARISON:  02/15/2024    FINDINGS:  Heart is mildly enlarged.  Mild basilar atelectatic changes versus scarring.    Small hiatal hernia.  Otherwise, stomach shows no significant abnormalities.    Liver is homogeneous in attenuation with no focal liver lesions.  Spleen, pancreas and right adrenal gland shows no significant abnormalities.  Mild left adrenal gland thickening without discrete nodule.    There is a 2.4 cm right renal hypodensity likely a cyst.  Minimal dilatation of the right collecting system.  There is a solid-appearing mass involving the left kidney.  This extends into the renal pelvis but also has an exophytic component.  This measures 4.1 x 3.3 x 4.3 cm.  No left-sided hydronephrosis.  Left renal vein appears patent.    Urinary bladder and uterus are displaced anteriorly by the pelvic abscess.    There is extensive  colonic diverticulosis with wall thickening involving the sigmoid colon likely relating to chronic diverticulitis.  There are inflammatory changes in the pelvis with a 5.1 x 3.4 x 5.0 cm rim enhancing collection in the pelvis worrisome for abscess.  Appendix shows no significant abnormalities.  No free air.  There are postoperative changes of right lower abdominal hernia repair.    Abdominal aorta is normal in caliber with moderate atherosclerosis.  Several normal sized periaortic lymph nodes.  No abnormal lymph node enlargement.    Osseous structures show degenerative changes.  No acute osseous abnormalities.      Impression    Extensive colonic diverticulosis with findings worrisome for acute on chronic sigmoid diverticulitis with focal abscess in the deep pelvis.    4.3 cm solid left renal mass highly worrisome for renal cell neoplasm.  Recommend close follow-up with urology.    This report was flagged in Epic as abnormal.      Electronically signed by: Christopher Angeles MD  Date:    02/21/2024  Time:    13:05     No results found for this or any previous visit (from the past 2160 hour(s)).  CT Abdomen Pelvis  Without Contrast  Narrative: EXAMINATION:  CT ABDOMEN PELVIS WITHOUT CONTRAST    CLINICAL HISTORY:  Abdominal abscess/infection suspected;    TECHNIQUE:  Low dose axial images, sagittal and coronal reformations were obtained from the lung bases to the pubic symphysis.  Oral contrast was not administered.    COMPARISON:  02/21/2024    FINDINGS:  Limited evaluation of the structures at the base of the chest show no concerning masses or nodules.    The liver is normal in size.  No solid mass noting lack of IV contrast.  No biliary ductal dilatation.  Gallbladder is unremarkable.    Spleen, adrenal glands, and pancreas show no focal abnormalities noting lack of IV contrast.    The bilateral kidneys are unchanged in size and position.  The posterior lower pole mass of the left kidney is unchanged from recent prior  exam, not as well evaluated today without IV contrast.  No hydronephrosis or nephrolithiasis.  The urinary bladder shows no focal wall thickening.    On going sigmoid diverticulitis.  No evidence of obstruction.  There is a left trans gluteal approach percutaneous drain in place with apparent resolution of the previously identified complex abscess.  There remains moderate amount of inflammatory stranding in the dependent pelvis.  Small hiatal hernia is present.  There is no free gas.  No additional organized or drainable fluid collections.  No significant lymphadenopathy.  Surgical changes to the right abdominal wall unchanged from prior.    Vascular structures show mild atherosclerosis without aneurysm.  Degenerative change of the spine without an acute fracture or destructive osseous lesion.  Impression: Apparent resolution of the previously identified deep pelvic abscess with percutaneous drain in place.    Ongoing sigmoid diverticulitis.  There is inflammatory stranding in the dependent pelvis but no additional drainable fluid collection.    Left renal mass better evaluated on the recent contrasted exam.  Additional findings as above stable from prior.    Electronically signed by: Michael Brown MD  Date:    02/23/2024  Time:    16:50  Interventional Radiology  Procedure performed in the Invasive Lab    - See Procedure Log link below for nursing documentation    - See OpNote on Surgeries Tab for physician findings    - See Imaging Tab for radiologist dictation       ASSESSMENT     1. Left renal mass          PLAN:      Long talk about renal mass and it's management.  Reviewed images.Discussed options including active surveillance, biopsy, minimally invasive techniques including HFRA, cryo. Discussed open and laparascopic surgical approaches. Discussed partial and radical nephrectomy. Discussed surgery preparation, surgery, recuperation, recovery, exercise restrictions. Discussed risks, benefits, and  complications. Answered questions and addressed their concerns.  Patient wishes to proceed with surgery.   CXR.   We discussed with her complicated abdominal history that she may have a significant amount of intra-abdominal adhesions and we should wait at least 8 weeks from her diverticulitis episode in abscess drain placement in order to allow things to cool down.  We also discussed that laparoscopic and robotic surgery could be complicated by intra-abdominal adhesions and require an open conversion.  We will plan for left robotic radical nephrectomy on 4/24.  We discussed partial nephrectomy and the potential for complications with that and the patient elected for radical nephrectomy.  UCx    Temo Hodge MD  Urology  [facility]    Disclaimer: This note has been generated using voice-recognition software. There may be typographical errors that have been missed during proof-reading.

## 2024-04-23 ENCOUNTER — ANESTHESIA EVENT (OUTPATIENT)
Dept: SURGERY | Facility: HOSPITAL | Age: 74
DRG: 658 | End: 2024-04-23
Payer: MEDICARE

## 2024-04-24 ENCOUNTER — HOSPITAL ENCOUNTER (INPATIENT)
Facility: HOSPITAL | Age: 74
LOS: 1 days | Discharge: HOME | DRG: 658 | End: 2024-04-25
Attending: UROLOGY | Admitting: UROLOGY
Payer: MEDICARE

## 2024-04-24 ENCOUNTER — ANESTHESIA (OUTPATIENT)
Dept: SURGERY | Facility: HOSPITAL | Age: 74
DRG: 658 | End: 2024-04-24
Payer: MEDICARE

## 2024-04-24 DIAGNOSIS — N28.89 RENAL MASS: Primary | ICD-10-CM

## 2024-04-24 LAB
ABO + RH BLD: ABNORMAL
BLD GP AB SCN CELLS X3 SERPL QL: ABNORMAL
BLOOD GROUP ANTIBODIES SERPL: NORMAL
DAT IGG-SP REAG RBC-IMP: NORMAL
OHS QRS DURATION: 74 MS
OHS QTC CALCULATION: 421 MS
SPECIMEN OUTDATE: ABNORMAL

## 2024-04-24 PROCEDURE — 36415 COLL VENOUS BLD VENIPUNCTURE: CPT | Performed by: UROLOGY

## 2024-04-24 PROCEDURE — 36000712 HC OR TIME LEV V 1ST 15 MIN: Performed by: UROLOGY

## 2024-04-24 PROCEDURE — 27201423 OPTIME MED/SURG SUP & DEVICES STERILE SUPPLY: Performed by: UROLOGY

## 2024-04-24 PROCEDURE — 86905 BLOOD TYPING RBC ANTIGENS: CPT | Mod: 91 | Performed by: UROLOGY

## 2024-04-24 PROCEDURE — 25000003 PHARM REV CODE 250: Performed by: UROLOGY

## 2024-04-24 PROCEDURE — 11000001 HC ACUTE MED/SURG PRIVATE ROOM

## 2024-04-24 PROCEDURE — 86901 BLOOD TYPING SEROLOGIC RH(D): CPT | Performed by: UROLOGY

## 2024-04-24 PROCEDURE — 88307 TISSUE EXAM BY PATHOLOGIST: CPT | Mod: 26,HCNC,, | Performed by: PATHOLOGY

## 2024-04-24 PROCEDURE — 93010 ELECTROCARDIOGRAM REPORT: CPT | Mod: ,,, | Performed by: INTERNAL MEDICINE

## 2024-04-24 PROCEDURE — 71000039 HC RECOVERY, EACH ADD'L HOUR: Performed by: UROLOGY

## 2024-04-24 PROCEDURE — 63600175 PHARM REV CODE 636 W HCPCS: Performed by: NURSE ANESTHETIST, CERTIFIED REGISTERED

## 2024-04-24 PROCEDURE — 86870 RBC ANTIBODY IDENTIFICATION: CPT | Performed by: UROLOGY

## 2024-04-24 PROCEDURE — C9290 INJ, BUPIVACAINE LIPOSOME: HCPCS | Performed by: UROLOGY

## 2024-04-24 PROCEDURE — 37000008 HC ANESTHESIA 1ST 15 MINUTES: Performed by: UROLOGY

## 2024-04-24 PROCEDURE — 0DNW4ZZ RELEASE PERITONEUM, PERCUTANEOUS ENDOSCOPIC APPROACH: ICD-10-PCS | Performed by: UROLOGY

## 2024-04-24 PROCEDURE — 0TT14ZZ RESECTION OF LEFT KIDNEY, PERCUTANEOUS ENDOSCOPIC APPROACH: ICD-10-PCS | Performed by: UROLOGY

## 2024-04-24 PROCEDURE — 63600175 PHARM REV CODE 636 W HCPCS: Mod: JZ,JG | Performed by: UROLOGY

## 2024-04-24 PROCEDURE — D9220A PRA ANESTHESIA: Mod: CRNA,,, | Performed by: NURSE ANESTHETIST, CERTIFIED REGISTERED

## 2024-04-24 PROCEDURE — 88341 IMHCHEM/IMCYTCHM EA ADD ANTB: CPT | Mod: 26,HCNC,, | Performed by: PATHOLOGY

## 2024-04-24 PROCEDURE — 88341 IMHCHEM/IMCYTCHM EA ADD ANTB: CPT | Mod: HCNC | Performed by: PATHOLOGY

## 2024-04-24 PROCEDURE — 50545 LAPARO RADICAL NEPHRECTOMY: CPT | Mod: 22,LT,, | Performed by: UROLOGY

## 2024-04-24 PROCEDURE — 93005 ELECTROCARDIOGRAM TRACING: CPT

## 2024-04-24 PROCEDURE — 94761 N-INVAS EAR/PLS OXIMETRY MLT: CPT

## 2024-04-24 PROCEDURE — 94799 UNLISTED PULMONARY SVC/PX: CPT | Mod: XB

## 2024-04-24 PROCEDURE — 88342 IMHCHEM/IMCYTCHM 1ST ANTB: CPT | Mod: 26,HCNC,, | Performed by: PATHOLOGY

## 2024-04-24 PROCEDURE — 86880 COOMBS TEST DIRECT: CPT | Performed by: UROLOGY

## 2024-04-24 PROCEDURE — 71000033 HC RECOVERY, INTIAL HOUR: Performed by: UROLOGY

## 2024-04-24 PROCEDURE — 25000003 PHARM REV CODE 250: Performed by: NURSE ANESTHETIST, CERTIFIED REGISTERED

## 2024-04-24 PROCEDURE — 88307 TISSUE EXAM BY PATHOLOGIST: CPT | Mod: HCNC | Performed by: PATHOLOGY

## 2024-04-24 PROCEDURE — 99900035 HC TECH TIME PER 15 MIN (STAT)

## 2024-04-24 PROCEDURE — 63600175 PHARM REV CODE 636 W HCPCS: Performed by: UROLOGY

## 2024-04-24 PROCEDURE — 27000221 HC OXYGEN, UP TO 24 HOURS

## 2024-04-24 PROCEDURE — 36000713 HC OR TIME LEV V EA ADD 15 MIN: Performed by: UROLOGY

## 2024-04-24 PROCEDURE — 88342 IMHCHEM/IMCYTCHM 1ST ANTB: CPT | Mod: HCNC | Performed by: PATHOLOGY

## 2024-04-24 PROCEDURE — 37000009 HC ANESTHESIA EA ADD 15 MINS: Performed by: UROLOGY

## 2024-04-24 PROCEDURE — D9220A PRA ANESTHESIA: Mod: ANES,,, | Performed by: ANESTHESIOLOGY

## 2024-04-24 RX ORDER — HYDROMORPHONE HYDROCHLORIDE 2 MG/ML
INJECTION, SOLUTION INTRAMUSCULAR; INTRAVENOUS; SUBCUTANEOUS
Status: DISCONTINUED | OUTPATIENT
Start: 2024-04-24 | End: 2024-04-24

## 2024-04-24 RX ORDER — ONDANSETRON HYDROCHLORIDE 2 MG/ML
4 INJECTION, SOLUTION INTRAVENOUS EVERY 12 HOURS PRN
Status: DISCONTINUED | OUTPATIENT
Start: 2024-04-24 | End: 2024-04-25 | Stop reason: HOSPADM

## 2024-04-24 RX ORDER — PHENAZOPYRIDINE HYDROCHLORIDE 100 MG/1
200 TABLET, FILM COATED ORAL
Status: DISCONTINUED | OUTPATIENT
Start: 2024-04-24 | End: 2024-04-25 | Stop reason: HOSPADM

## 2024-04-24 RX ORDER — LABETALOL HYDROCHLORIDE 5 MG/ML
INJECTION, SOLUTION INTRAVENOUS
Status: DISCONTINUED | OUTPATIENT
Start: 2024-04-24 | End: 2024-04-24

## 2024-04-24 RX ORDER — FENTANYL CITRATE 50 UG/ML
INJECTION, SOLUTION INTRAMUSCULAR; INTRAVENOUS
Status: DISCONTINUED | OUTPATIENT
Start: 2024-04-24 | End: 2024-04-24

## 2024-04-24 RX ORDER — LIDOCAINE HYDROCHLORIDE 20 MG/ML
INJECTION INTRAVENOUS
Status: DISCONTINUED | OUTPATIENT
Start: 2024-04-24 | End: 2024-04-24

## 2024-04-24 RX ORDER — ACETAMINOPHEN 325 MG/1
650 TABLET ORAL EVERY 4 HOURS PRN
Status: DISCONTINUED | OUTPATIENT
Start: 2024-04-24 | End: 2024-04-25 | Stop reason: HOSPADM

## 2024-04-24 RX ORDER — PROMETHAZINE HYDROCHLORIDE 25 MG/1
25 SUPPOSITORY RECTAL EVERY 6 HOURS PRN
Status: DISCONTINUED | OUTPATIENT
Start: 2024-04-24 | End: 2024-04-25 | Stop reason: HOSPADM

## 2024-04-24 RX ORDER — BUPIVACAINE HYDROCHLORIDE 2.5 MG/ML
INJECTION, SOLUTION EPIDURAL; INFILTRATION; INTRACAUDAL
Status: DISCONTINUED | OUTPATIENT
Start: 2024-04-24 | End: 2024-04-24 | Stop reason: HOSPADM

## 2024-04-24 RX ORDER — TRAZODONE HYDROCHLORIDE 50 MG/1
50 TABLET ORAL NIGHTLY PRN
Status: DISCONTINUED | OUTPATIENT
Start: 2024-04-24 | End: 2024-04-25 | Stop reason: HOSPADM

## 2024-04-24 RX ORDER — LIDOCAINE HYDROCHLORIDE 10 MG/ML
1 INJECTION, SOLUTION EPIDURAL; INFILTRATION; INTRACAUDAL; PERINEURAL ONCE
Status: DISCONTINUED | OUTPATIENT
Start: 2024-04-24 | End: 2024-04-24 | Stop reason: HOSPADM

## 2024-04-24 RX ORDER — TALC
6 POWDER (GRAM) TOPICAL NIGHTLY PRN
Status: DISCONTINUED | OUTPATIENT
Start: 2024-04-24 | End: 2024-04-25 | Stop reason: HOSPADM

## 2024-04-24 RX ORDER — HYDROMORPHONE HYDROCHLORIDE 2 MG/ML
0.2 INJECTION, SOLUTION INTRAMUSCULAR; INTRAVENOUS; SUBCUTANEOUS EVERY 5 MIN PRN
Status: DISCONTINUED | OUTPATIENT
Start: 2024-04-24 | End: 2024-04-24 | Stop reason: HOSPADM

## 2024-04-24 RX ORDER — PHENYLEPHRINE HYDROCHLORIDE 10 MG/ML
INJECTION INTRAVENOUS
Status: DISCONTINUED | OUTPATIENT
Start: 2024-04-24 | End: 2024-04-24

## 2024-04-24 RX ORDER — CEFAZOLIN SODIUM 2 G/50ML
2 SOLUTION INTRAVENOUS
Status: COMPLETED | OUTPATIENT
Start: 2024-04-24 | End: 2024-04-24

## 2024-04-24 RX ORDER — OXYCODONE HYDROCHLORIDE 5 MG/1
5 TABLET ORAL
Status: DISCONTINUED | OUTPATIENT
Start: 2024-04-24 | End: 2024-04-24 | Stop reason: HOSPADM

## 2024-04-24 RX ORDER — ROCURONIUM BROMIDE 10 MG/ML
INJECTION, SOLUTION INTRAVENOUS
Status: DISCONTINUED | OUTPATIENT
Start: 2024-04-24 | End: 2024-04-24

## 2024-04-24 RX ORDER — KETAMINE HCL IN 0.9 % NACL 50 MG/5 ML
SYRINGE (ML) INTRAVENOUS
Status: DISCONTINUED | OUTPATIENT
Start: 2024-04-24 | End: 2024-04-24

## 2024-04-24 RX ORDER — MIDAZOLAM HYDROCHLORIDE 1 MG/ML
INJECTION INTRAMUSCULAR; INTRAVENOUS
Status: DISCONTINUED | OUTPATIENT
Start: 2024-04-24 | End: 2024-04-24

## 2024-04-24 RX ORDER — MUPIROCIN 20 MG/G
OINTMENT TOPICAL 2 TIMES DAILY
Status: DISCONTINUED | OUTPATIENT
Start: 2024-04-25 | End: 2024-04-25 | Stop reason: HOSPADM

## 2024-04-24 RX ORDER — OXYBUTYNIN CHLORIDE 5 MG/1
5 TABLET ORAL 3 TIMES DAILY PRN
Status: DISCONTINUED | OUTPATIENT
Start: 2024-04-24 | End: 2024-04-25 | Stop reason: HOSPADM

## 2024-04-24 RX ORDER — PROPOFOL 10 MG/ML
VIAL (ML) INTRAVENOUS
Status: DISCONTINUED | OUTPATIENT
Start: 2024-04-24 | End: 2024-04-24

## 2024-04-24 RX ORDER — SODIUM CHLORIDE 0.9 % (FLUSH) 0.9 %
10 SYRINGE (ML) INJECTION
Status: DISCONTINUED | OUTPATIENT
Start: 2024-04-24 | End: 2024-04-24 | Stop reason: HOSPADM

## 2024-04-24 RX ORDER — SODIUM CHLORIDE, SODIUM LACTATE, POTASSIUM CHLORIDE, CALCIUM CHLORIDE 600; 310; 30; 20 MG/100ML; MG/100ML; MG/100ML; MG/100ML
INJECTION, SOLUTION INTRAVENOUS CONTINUOUS
Status: DISCONTINUED | OUTPATIENT
Start: 2024-04-24 | End: 2024-04-25 | Stop reason: HOSPADM

## 2024-04-24 RX ORDER — DEXAMETHASONE SODIUM PHOSPHATE 4 MG/ML
INJECTION, SOLUTION INTRA-ARTICULAR; INTRALESIONAL; INTRAMUSCULAR; INTRAVENOUS; SOFT TISSUE
Status: DISCONTINUED | OUTPATIENT
Start: 2024-04-24 | End: 2024-04-24

## 2024-04-24 RX ORDER — HYDROCODONE BITARTRATE AND ACETAMINOPHEN 5; 325 MG/1; MG/1
1 TABLET ORAL EVERY 4 HOURS PRN
Status: DISCONTINUED | OUTPATIENT
Start: 2024-04-24 | End: 2024-04-25 | Stop reason: HOSPADM

## 2024-04-24 RX ORDER — SODIUM CHLORIDE 9 MG/ML
INJECTION, SOLUTION INTRAVENOUS
Status: DISCONTINUED | OUTPATIENT
Start: 2024-04-24 | End: 2024-04-25 | Stop reason: HOSPADM

## 2024-04-24 RX ORDER — HYDROMORPHONE HYDROCHLORIDE 1 MG/ML
1 INJECTION, SOLUTION INTRAMUSCULAR; INTRAVENOUS; SUBCUTANEOUS EVERY 4 HOURS PRN
Status: DISCONTINUED | OUTPATIENT
Start: 2024-04-24 | End: 2024-04-25 | Stop reason: HOSPADM

## 2024-04-24 RX ORDER — CEFAZOLIN SODIUM 2 G/50ML
2 SOLUTION INTRAVENOUS
Qty: 150 ML | Refills: 0 | Status: COMPLETED | OUTPATIENT
Start: 2024-04-24 | End: 2024-04-25

## 2024-04-24 RX ORDER — ACETAMINOPHEN 500 MG
1000 TABLET ORAL
Status: COMPLETED | OUTPATIENT
Start: 2024-04-24 | End: 2024-04-24

## 2024-04-24 RX ORDER — ONDANSETRON HYDROCHLORIDE 2 MG/ML
INJECTION, SOLUTION INTRAVENOUS
Status: DISCONTINUED | OUTPATIENT
Start: 2024-04-24 | End: 2024-04-24

## 2024-04-24 RX ORDER — PROCHLORPERAZINE EDISYLATE 5 MG/ML
5 INJECTION INTRAMUSCULAR; INTRAVENOUS EVERY 30 MIN PRN
Status: DISCONTINUED | OUTPATIENT
Start: 2024-04-24 | End: 2024-04-24 | Stop reason: HOSPADM

## 2024-04-24 RX ORDER — HYDRALAZINE HYDROCHLORIDE 20 MG/ML
10 INJECTION INTRAMUSCULAR; INTRAVENOUS EVERY 8 HOURS PRN
Status: DISCONTINUED | OUTPATIENT
Start: 2024-04-24 | End: 2024-04-25 | Stop reason: HOSPADM

## 2024-04-24 RX ORDER — VECURONIUM BROMIDE FOR INJECTION 1 MG/ML
INJECTION, POWDER, LYOPHILIZED, FOR SOLUTION INTRAVENOUS
Status: DISCONTINUED | OUTPATIENT
Start: 2024-04-24 | End: 2024-04-24

## 2024-04-24 RX ORDER — LABETALOL HYDROCHLORIDE 5 MG/ML
10 INJECTION, SOLUTION INTRAVENOUS EVERY 4 HOURS PRN
Status: DISCONTINUED | OUTPATIENT
Start: 2024-04-24 | End: 2024-04-25 | Stop reason: HOSPADM

## 2024-04-24 RX ADMIN — HYDROMORPHONE HYDROCHLORIDE 1 MG: 1 INJECTION, SOLUTION INTRAMUSCULAR; INTRAVENOUS; SUBCUTANEOUS at 02:04

## 2024-04-24 RX ADMIN — FENTANYL CITRATE 100 MCG: 50 INJECTION INTRAMUSCULAR; INTRAVENOUS at 08:04

## 2024-04-24 RX ADMIN — Medication 30 MG: at 08:04

## 2024-04-24 RX ADMIN — SODIUM CHLORIDE, POTASSIUM CHLORIDE, SODIUM LACTATE AND CALCIUM CHLORIDE: 600; 310; 30; 20 INJECTION, SOLUTION INTRAVENOUS at 09:04

## 2024-04-24 RX ADMIN — HYDROMORPHONE HYDROCHLORIDE 0.2 MG: 2 INJECTION INTRAMUSCULAR; INTRAVENOUS; SUBCUTANEOUS at 10:04

## 2024-04-24 RX ADMIN — SODIUM CHLORIDE, POTASSIUM CHLORIDE, SODIUM LACTATE AND CALCIUM CHLORIDE: 600; 310; 30; 20 INJECTION, SOLUTION INTRAVENOUS at 12:04

## 2024-04-24 RX ADMIN — VECURONIUM BROMIDE 3 MG: 10 INJECTION, POWDER, LYOPHILIZED, FOR SOLUTION INTRAVENOUS at 09:04

## 2024-04-24 RX ADMIN — SUGAMMADEX 200 MG: 100 INJECTION, SOLUTION INTRAVENOUS at 10:04

## 2024-04-24 RX ADMIN — ROCURONIUM BROMIDE 50 MG: 10 INJECTION, SOLUTION INTRAVENOUS at 08:04

## 2024-04-24 RX ADMIN — HYDROCODONE BITARTRATE AND ACETAMINOPHEN 1 TABLET: 5; 325 TABLET ORAL at 09:04

## 2024-04-24 RX ADMIN — FENTANYL CITRATE 50 MCG: 50 INJECTION INTRAMUSCULAR; INTRAVENOUS at 08:04

## 2024-04-24 RX ADMIN — ONDANSETRON 4 MG: 2 INJECTION INTRAMUSCULAR; INTRAVENOUS at 12:04

## 2024-04-24 RX ADMIN — CEFAZOLIN SODIUM 2 G: 2 SOLUTION INTRAVENOUS at 04:04

## 2024-04-24 RX ADMIN — MIDAZOLAM HYDROCHLORIDE 2 MG: 1 INJECTION, SOLUTION INTRAMUSCULAR; INTRAVENOUS at 08:04

## 2024-04-24 RX ADMIN — HYDROMORPHONE HYDROCHLORIDE 0.5 MG: 2 INJECTION INTRAMUSCULAR; INTRAVENOUS; SUBCUTANEOUS at 10:04

## 2024-04-24 RX ADMIN — HYDROMORPHONE HYDROCHLORIDE 0.8 MG: 2 INJECTION INTRAMUSCULAR; INTRAVENOUS; SUBCUTANEOUS at 10:04

## 2024-04-24 RX ADMIN — PROPOFOL 170 MG: 10 INJECTION, EMULSION INTRAVENOUS at 08:04

## 2024-04-24 RX ADMIN — GLYCOPYRROLATE 0.2 MG: 0.2 INJECTION, SOLUTION INTRAMUSCULAR; INTRAVITREAL at 08:04

## 2024-04-24 RX ADMIN — ONDANSETRON 4 MG: 2 INJECTION, SOLUTION INTRAMUSCULAR; INTRAVENOUS at 10:04

## 2024-04-24 RX ADMIN — TRAZODONE HYDROCHLORIDE 50 MG: 50 TABLET ORAL at 09:04

## 2024-04-24 RX ADMIN — Medication 10 MG: at 10:04

## 2024-04-24 RX ADMIN — FENTANYL CITRATE 50 MCG: 50 INJECTION INTRAMUSCULAR; INTRAVENOUS at 09:04

## 2024-04-24 RX ADMIN — ACETAMINOPHEN 1000 MG: 500 TABLET ORAL at 06:04

## 2024-04-24 RX ADMIN — Medication 10 MG: at 09:04

## 2024-04-24 RX ADMIN — CEFAZOLIN SODIUM 2 G: 2 SOLUTION INTRAVENOUS at 11:04

## 2024-04-24 RX ADMIN — LABETALOL HYDROCHLORIDE 10 MG: 5 INJECTION, SOLUTION INTRAVENOUS at 10:04

## 2024-04-24 RX ADMIN — HYDROCODONE BITARTRATE AND ACETAMINOPHEN 1 TABLET: 5; 325 TABLET ORAL at 12:04

## 2024-04-24 RX ADMIN — SODIUM CHLORIDE, SODIUM LACTATE, POTASSIUM CHLORIDE, AND CALCIUM CHLORIDE: .6; .31; .03; .02 INJECTION, SOLUTION INTRAVENOUS at 08:04

## 2024-04-24 RX ADMIN — DEXAMETHASONE SODIUM PHOSPHATE 4 MG: 4 INJECTION, SOLUTION INTRA-ARTICULAR; INTRALESIONAL; INTRAMUSCULAR; INTRAVENOUS; SOFT TISSUE at 08:04

## 2024-04-24 RX ADMIN — LIDOCAINE HYDROCHLORIDE 100 MG: 20 INJECTION, SOLUTION INTRAVENOUS at 08:04

## 2024-04-24 RX ADMIN — PHENAZOPYRIDINE HYDROCHLORIDE 200 MG: 100 TABLET ORAL at 04:04

## 2024-04-24 RX ADMIN — PHENYLEPHRINE HYDROCHLORIDE 100 MCG: 10 INJECTION INTRAVENOUS at 08:04

## 2024-04-24 RX ADMIN — CEFAZOLIN SODIUM 2 G: 2 SOLUTION INTRAVENOUS at 08:04

## 2024-04-24 RX ADMIN — SODIUM CHLORIDE: 9 INJECTION, SOLUTION INTRAVENOUS at 11:04

## 2024-04-24 NOTE — OP NOTE
[place] - Surgery ([facility])  Operative Note       Surgery Date: 4/24/2024     Pre-op Diagnosis:    Left Renal Mass    Post-op Diagnosis:    Left Renal Mass    Procedure Performed:   Left Robotic Radical Nephrectomy with removal of left kidney, ureteral cuff, and Gerota's fascia (complex 22 modifier)  Robotic lysis of adhesions    Justification for Complex 22 Modifier:  The patient had a hostile abdomen requiring an extensive lysis of adhesions and making mobilization of the colon and isolation of the renal hilum significantly more difficult to requiring increased technical complexity and expertise from a usual case.    Findings:   Extensive abdominal adhesions  Renal vein and artery taken without issue    Staff Surgeon:   Temo Hodge MD    Assistant Surgeon:   Ismael Garcia MD PGY-3    Bedside Assistant:  MARIEN Evans    Anesthesia:   General Endotracheal Anesthesia    Estimated Blood Loss:   50cc    IVF:   See anesthesia note         Specimens:   Left Kidney, ureteral cuff, and Gerota's fascia    Drains/Implants:   16 Fr Mishra catheter    Complications:   None    Indications:   Chely Kramer is a 73 y.o. female  with left renal mass suspicious for renal cell carcinoma. After discussion of management options and risks and benefits associated with each the patient has elected to pursue surgical management.     Procedure in Detail:   After discussion of risks and benefits of the procedure, informed consent was obtained. The patient was brought to the operating room and placed supine on the operating table.  SCDs were applied and working prior to induction of anesthesia.  General anesthesia was administered. A 16 Fr Mishra catheter was placed in the standard fashion with 10 ml sterile water used to inflate the balloon. An OG tube was placed. The patient was then moved into the modified flank position with the left side up. The patient was appropriately padded and secured to the table, which was slightly  flexed and rotated. The patient was then prepped and draped in the usual sterile fashion. Timeout was performed and preoperative antibiotics were confirmed.       A Veress needle was introduced into the abdomen. Entry into the peritoneal cavity was initially confirmed with aspiration which revealed no blood or succus and subsequent drop test. There was an initial insufflation pressure of <10mmHg.  The peritoneal cavity was insufflated up to 15 mmHg. The port sites were marked with adequate distance between sites. The location of the camera port was incised and the 8 mm trocar was introduced into the abdomen. The camera was passed through the port and the abdomen was inspected and found to be free of bowel or vascular injury. The Veress needle was then removed. Using direct vision the other 8 mm robotic ports were placed in a vertical line starting with one just below the left costal margin and one in the lower left abdomen.  A 12 mm robotic trocar with an 8 mm down-sizer was inserted caudal to the camera trocar to accommodate the robotic stapler.  A 15 mm assistant port was placed in the upper midline. A 5 mm airseal trocar was inserted for a additional retraction. Each trocar was introduced under direct vision ensuring no injury to the underlying abdominal contents. Extensive adhesions were noted throughout the abdomen.  Robotic scissors were used to lyse adhesions and restore normal anatomy, this took approximately 30 minutes.  The patient had a hostile abdomen requiring an extensive lysis of adhesions and making mobilization of the colon and isolation of the renal hilum significantly more difficult to requiring increased technical complexity and expertise from a usual case, therefore a complex 22 modifier was applied.    Dissection began along the white line of Toldt, reflecting the colon medially away from the kidney. The splenic reflection was released. Once the bowel was reflected, dissection was carried out  just below the kidney. The gonadal vein was dissected medially, and the psoas muscle was identified. The ureter was then identified and elevated. We continued our dissection toward the lower pole of the kidney until we identified the renal hilum.     Careful dissection of the hilum was performed. We focused on the isolation of the renal vessels which were dissected free from the surrounding tissue. Dissection between the hilar vessels, with identification of the main renal vein and the renal artery was performed. The endovascular RODRIGUEZ robotic stapler was inserted. The artery was stapled first followed by the vein. Hemostasis was excellent.    The kidney was then freed from the remaining superior, posterior and lateral attachments and was removed completely within Gerota's fascia for oncologic purposes. The ureter was clipped and divided and a small cuff of proximal ureter was sent with the specimen. Hemostasis was again assessed and was excellent. The mass was placed into an EndoCatch bag via a 15mm assistant port. Hemostatic agents were applied over the hilar resection bed. The robot was undocked and all trocars were removed. The 15 mm midline incision was extended from skin down to fascia to allow removal of the specimen. This was inspected and found to be intact. This was passed off the field for pathologic analysis.     The extraction site fascia was closed using a 1 PDS in running fashion. All skin incisions were closed using 4-0 monocryl sutures. Local anesthetic was administered. Dermabond was applied to all of the incisions.     The patient tolerated the procedure well, was awoken from anesthesia and was transferred to the recovery room in stable condition. All counts were correct.     Disposition: The patient will remain on the urology service overnight for observation.     Temo Hodge MD  Urology  [facility]

## 2024-04-24 NOTE — PROGRESS NOTES
04/24/24 1204   Vital Signs   Temp 97.4 °F (36.3 °C)   Temp Source Oral   Pulse (!) 58   Heart Rate Source Monitor   Resp 18   SpO2 98 %   Flow (L/min) (Oxygen Therapy) 2   Device (Oxygen Therapy) nasal cannula   BP (!) 153/67   MAP (mmHg) 96   BP Location Right arm   BP Method Automatic   Patient Position Lying   Height and Weight   Weight 70.5 kg (155 lb 6.8 oz)   Weight Method Bed Scale   BMI (Calculated) 29.4     Patient arrived to room from recovery.  Family member at the bedside. Sleepy but arousable.  Belongings that are in in patient clear bag and patients purse placed in closet.  Mishra catheter intact, IVF initiated.  Bed alarm on, wheels locked, call light within reach, safety maintained.  Will continue to monitor.

## 2024-04-24 NOTE — ANESTHESIA PROCEDURE NOTES
Intubation    Date/Time: 4/24/2024 8:12 AM    Performed by: Sameer Ko CRNA  Authorized by: Jennifer Hodge MD    Intubation:     Induction:  Intravenous    Intubated:  Postinduction    Mask Ventilation:  Easy mask    Attempts:  2    Attempted By:  CRNA and student    Method of Intubation:  Direct    Blade:  [place] 2    Laryngeal View Grade: Grade IIb - only the arytenoids and epiglottis seen      Attempted By (2nd Attempt):  Student    Method of Intubation (2nd Attempt):  Video laryngoscopy    Blade (2nd Attempt):  [place] 3    Laryngeal View Grade (2nd Attempt): Grade I - full view of cords      Difficult Airway Encountered?: No      Complications:  None    Airway Device:  Oral endotracheal tube    Airway Device Size:  7.0    Style/Cuff Inflation:  Cuffed (inflated to minimal occlusive pressure)    Tube secured:  21    Secured at:  The lips    Placement Verified By:  Capnometry    Complicating Factors:  None    Findings Post-Intubation:  BS equal bilateral and atraumatic/condition of teeth unchanged

## 2024-04-24 NOTE — ANESTHESIA PREPROCEDURE EVALUATION
04/24/2024  Chely Kramer is a 73 y.o., female here for robotic nephrectomy.       Pre-op Assessment    I have reviewed the Patient Summary Reports.    I have reviewed the NPO Status.   I have reviewed the Medications.     Review of Systems  Anesthesia Hx:  No problems with previous Anesthesia               Denies Personal Hx of Anesthesia complications.                    Social:  No Alcohol Use, Former Smoker       Hematology/Oncology:  Hematology Normal   Oncology Normal                                   EENT/Dental:  EENT/Dental Normal           Cardiovascular:  Cardiovascular Normal                                            Pulmonary:  Pulmonary Normal                       Renal/:  Renal/ Normal                 Hepatic/GI:        Hx of diverticulitis requiring IR drainage of pelvic abscess recently          Musculoskeletal:  Arthritis               Neurological:  Neurology Normal                                      Endocrine:  Endocrine Normal            Dermatological:  Skin Normal    Psych:  Psychiatric Normal                    Physical Exam  General: Well nourished, Cooperative, Alert and Oriented    Airway:  Mallampati: II   Mouth Opening: Normal  TM Distance: Normal  Tongue: Normal  Neck ROM: Normal ROM        Anesthesia Plan  Type of Anesthesia, risks & benefits discussed:    Anesthesia Type: Gen ETT  Intra-op Monitoring Plan: Standard ASA Monitors  Post Op Pain Control Plan: multimodal analgesia  Induction:  IV  Airway Plan: Video and Direct, Post-Induction  Informed Consent: Informed consent signed with the Patient and all parties understand the risks and agree with anesthesia plan.  All questions answered.   ASA Score: 2    Ready For Surgery From Anesthesia Perspective.     .

## 2024-04-24 NOTE — INTERVAL H&P NOTE
The patient has been examined and the H&P has been reviewed:    I concur with the findings and no changes have occurred since H&P was written.    Procedure risks, benefits and alternative options discussed and understood by patient/family.    All benefits, risks, and alternatives were explained to the patient in great detail. All patient questions were addressed and the patient voiced clear understanding of our discussion. The patient has elected to undergo L robotic nephrectomy.    The patient denies all recent n/v/d, fevers, chills, and illnesses.       There are no hospital problems to display for this patient.

## 2024-04-24 NOTE — TRANSFER OF CARE
"Anesthesia Transfer of Care Note    Patient: Chely Kramer    Procedure(s) Performed: Procedure(s) (LRB):  ROBOTIC NEPHRECTOMY (Left)    Patient location: PACU    Anesthesia Type: general    Transport from OR: Transported from OR on 6-10 L/min O2 by face mask with adequate spontaneous ventilation    Post pain: adequate analgesia    Post assessment: no apparent anesthetic complications    Post vital signs: stable    Level of consciousness: awake    Nausea/Vomiting: no nausea/vomiting    Complications: none    Transfer of care protocol was followed    Last vitals: Visit Vitals  /68 (BP Location: Right arm, Patient Position: Lying)   Pulse 76   Temp 37.2 °C (98.9 °F) (Skin)   Resp 18   Ht 5' 1" (1.549 m)   Wt 67.6 kg (149 lb)   SpO2 96%   Breastfeeding No   BMI 28.15 kg/m²     "

## 2024-04-24 NOTE — PLAN OF CARE
VN note: Care plan, orders and labs reviewed.    Problem: Adult Inpatient Plan of Care  Goal: Plan of Care Review  Outcome: Progressing     Problem: Adult Inpatient Plan of Care  Goal: Patient-Specific Goal (Individualized)  Outcome: Progressing

## 2024-04-25 VITALS
RESPIRATION RATE: 20 BRPM | OXYGEN SATURATION: 97 % | DIASTOLIC BLOOD PRESSURE: 66 MMHG | SYSTOLIC BLOOD PRESSURE: 146 MMHG | BODY MASS INDEX: 29.35 KG/M2 | WEIGHT: 155.44 LBS | TEMPERATURE: 98 F | HEART RATE: 67 BPM | HEIGHT: 61 IN

## 2024-04-25 LAB
ANION GAP SERPL CALC-SCNC: 8 MMOL/L (ref 8–16)
BASOPHILS # BLD AUTO: 0.02 K/UL (ref 0–0.2)
BASOPHILS NFR BLD: 0.2 % (ref 0–1.9)
BUN SERPL-MCNC: 16 MG/DL (ref 8–23)
CALCIUM SERPL-MCNC: 9 MG/DL (ref 8.7–10.5)
CHLORIDE SERPL-SCNC: 105 MMOL/L (ref 95–110)
CO2 SERPL-SCNC: 25 MMOL/L (ref 23–29)
CREAT SERPL-MCNC: 0.9 MG/DL (ref 0.5–1.4)
DIFFERENTIAL METHOD BLD: ABNORMAL
EOSINOPHIL # BLD AUTO: 0 K/UL (ref 0–0.5)
EOSINOPHIL NFR BLD: 0.1 % (ref 0–8)
ERYTHROCYTE [DISTWIDTH] IN BLOOD BY AUTOMATED COUNT: 14.1 % (ref 11.5–14.5)
EST. GFR  (NO RACE VARIABLE): >60 ML/MIN/1.73 M^2
GLUCOSE SERPL-MCNC: 107 MG/DL (ref 70–110)
HCT VFR BLD AUTO: 33.8 % (ref 37–48.5)
HGB BLD-MCNC: 11.3 G/DL (ref 12–16)
IMM GRANULOCYTES # BLD AUTO: 0.04 K/UL (ref 0–0.04)
IMM GRANULOCYTES NFR BLD AUTO: 0.4 % (ref 0–0.5)
LYMPHOCYTES # BLD AUTO: 1.9 K/UL (ref 1–4.8)
LYMPHOCYTES NFR BLD: 19.4 % (ref 18–48)
MCH RBC QN AUTO: 32.1 PG (ref 27–31)
MCHC RBC AUTO-ENTMCNC: 33.4 G/DL (ref 32–36)
MCV RBC AUTO: 96 FL (ref 82–98)
MONOCYTES # BLD AUTO: 0.9 K/UL (ref 0.3–1)
MONOCYTES NFR BLD: 8.9 % (ref 4–15)
NEUTROPHILS # BLD AUTO: 6.9 K/UL (ref 1.8–7.7)
NEUTROPHILS NFR BLD: 71 % (ref 38–73)
NRBC BLD-RTO: 0 /100 WBC
PLATELET # BLD AUTO: 251 K/UL (ref 150–450)
PMV BLD AUTO: 9.1 FL (ref 9.2–12.9)
POTASSIUM SERPL-SCNC: 4 MMOL/L (ref 3.5–5.1)
RBC # BLD AUTO: 3.52 M/UL (ref 4–5.4)
SODIUM SERPL-SCNC: 138 MMOL/L (ref 136–145)
WBC # BLD AUTO: 9.67 K/UL (ref 3.9–12.7)

## 2024-04-25 PROCEDURE — 25000003 PHARM REV CODE 250: Performed by: UROLOGY

## 2024-04-25 PROCEDURE — 63600175 PHARM REV CODE 636 W HCPCS: Performed by: UROLOGY

## 2024-04-25 PROCEDURE — 99900035 HC TECH TIME PER 15 MIN (STAT)

## 2024-04-25 PROCEDURE — 85025 COMPLETE CBC W/AUTO DIFF WBC: CPT | Performed by: UROLOGY

## 2024-04-25 PROCEDURE — 36415 COLL VENOUS BLD VENIPUNCTURE: CPT | Performed by: UROLOGY

## 2024-04-25 PROCEDURE — 94799 UNLISTED PULMONARY SVC/PX: CPT | Mod: XB

## 2024-04-25 PROCEDURE — 80048 BASIC METABOLIC PNL TOTAL CA: CPT | Performed by: UROLOGY

## 2024-04-25 PROCEDURE — 94761 N-INVAS EAR/PLS OXIMETRY MLT: CPT

## 2024-04-25 RX ORDER — OXYCODONE AND ACETAMINOPHEN 5; 325 MG/1; MG/1
1 TABLET ORAL EVERY 6 HOURS PRN
Qty: 15 TABLET | Refills: 0 | Status: SHIPPED | OUTPATIENT
Start: 2024-04-25

## 2024-04-25 RX ORDER — DEXTROMETHORPHAN HYDROBROMIDE, GUAIFENESIN 5; 100 MG/5ML; MG/5ML
650 LIQUID ORAL EVERY 8 HOURS
Qty: 90 TABLET | Refills: 0 | Status: SHIPPED | OUTPATIENT
Start: 2024-04-25 | End: 2024-05-25

## 2024-04-25 RX ORDER — POLYETHYLENE GLYCOL 3350 17 G/17G
17 POWDER, FOR SOLUTION ORAL DAILY
Qty: 510 G | Refills: 0 | Status: SHIPPED | OUTPATIENT
Start: 2024-04-25 | End: 2024-05-25

## 2024-04-25 RX ORDER — IBUPROFEN 800 MG/1
800 TABLET ORAL 3 TIMES DAILY
Qty: 90 TABLET | Refills: 0 | Status: SHIPPED | OUTPATIENT
Start: 2024-04-25 | End: 2024-05-25

## 2024-04-25 RX ADMIN — PHENAZOPYRIDINE HYDROCHLORIDE 200 MG: 100 TABLET ORAL at 07:04

## 2024-04-25 RX ADMIN — MUPIROCIN: 20 OINTMENT TOPICAL at 12:04

## 2024-04-25 RX ADMIN — CEFAZOLIN SODIUM 2 G: 2 SOLUTION INTRAVENOUS at 07:04

## 2024-04-25 RX ADMIN — SODIUM CHLORIDE, POTASSIUM CHLORIDE, SODIUM LACTATE AND CALCIUM CHLORIDE: 600; 310; 30; 20 INJECTION, SOLUTION INTRAVENOUS at 05:04

## 2024-04-25 RX ADMIN — MUPIROCIN: 20 OINTMENT TOPICAL at 07:04

## 2024-04-25 RX ADMIN — ONDANSETRON 4 MG: 2 INJECTION INTRAMUSCULAR; INTRAVENOUS at 12:04

## 2024-04-25 NOTE — HPI
Ms. Kramer is a 73-year-old female who is now status post left radical robotic nephrectomy on 04/24/2024.

## 2024-04-25 NOTE — PLAN OF CARE
Problem: Adult Inpatient Plan of Care  Goal: Plan of Care Review  Outcome: Progressing   Chart and care plan reviewed

## 2024-04-25 NOTE — PLAN OF CARE
AVS and educational attachments shared with patient and sister via [employer]. Discussed thoroughly. Patient and sister verbalized clear understanding using teach back method. Notified bedside nurse of completion of education. At present no distress noted. Patient to be discharged via w/c with escort service and family with all of patient's belonings. Will cont to be available to patient and family and intervene prn.

## 2024-04-25 NOTE — NURSING
Received pt from [facility]. Pt lying in bed supine, with HOB flat. Sister at bedside attentive to pt. Mishra and SCDs in place. IVF infusing. Incision and lap sites noted to have scant sanguinous drainage. Pt ambulated 2 laps around unit using RW and spent ~ 5 minutes out on balcony. Pt tolerated process well. Plan of care, safety protocols and call light functions reviewed with pt and sister. Bed in low/locked position with alarm set, 2 side rails raised and call light within reach. Sister to remain at bedside throughout shift.

## 2024-04-25 NOTE — PLAN OF CARE
SW met with pt at bedside to complete final d/c assessment. Pt stated that she will have her sister help transport her home later today. Pt has f/u appt listed on AVS. Rounds completed on pt. All questions addressed. Bedside nurse to discuss d/c medications. Discussed importance to attend all f/u appts and take medications as prescribed. Verbalized understanding. Case Management to sign off.     Fercho Barnard, MSW  [phone]    Future Appointments   Date Time Provider Department Center   5/10/2024 10:15 AM Temo Hodge MD [facility] UROLOGY [facility]   5/20/2024 11:00 AM Tracee Thurman MD [facility]        04/25/24 1019   Final Note   Assessment Type Final Discharge Note   Anticipated Discharge Disposition Home   What phone number can be called within the next 1-3 days to see how you are doing after discharge? [phone]   Post-Acute Status   Coverage [employer]   Discharge Delays None known at this time

## 2024-04-25 NOTE — ASSESSMENT & PLAN NOTE
- Ambulate qid  - Strict I's/O's  - Drains:  Discontinue Mishra catheter, follow up voiding trial  - regular diet  - MIVF  - Abx:  Ancef  - Home meds  - Pain control  - PPx: SCD's/BO's/IS  - Dispo:  Plan for discharge home following toleration of breakfast this morning, ambulation and halls, and successful passage of voiding trial

## 2024-04-25 NOTE — SUBJECTIVE & OBJECTIVE
Interval History:  No acute events overnight.  Pain well controlled this morning.  Ambulated halls last night without complication.  Tolerated p.o. diet without nausea or vomiting.  No subjective fever or chills this morning.  Labs remained stable.  On 900 cc of urine output overnight.      Objective:     Temp:  [97.3 °F (36.3 °C)-98.3 °F (36.8 °C)] 97.8 °F (36.6 °C)  Pulse:  [58-77] 62  Resp:  [12-20] 18  SpO2:  [90 %-100 %] 96 %  BP: (126-184)/(59-79) 147/63     Body mass index is 29.37 kg/m².           Drains       Drain  Duration                  Urethral Catheter 04/24/24 0822 Non-latex;Straight-tip 16 Fr. <1 day                     Physical Exam  Vitals and nursing note reviewed.   Constitutional:       General: She is not in acute distress.  HENT:      Head: Atraumatic.      Nose: Nose normal.   Eyes:      Extraocular Movements: Extraocular movements intact.   Cardiovascular:      Rate and Rhythm: Normal rate.   Pulmonary:      Effort: Pulmonary effort is normal.   Abdominal:      General: Abdomen is flat. There is no distension.      Tenderness: There is no right CVA tenderness or left CVA tenderness.      Comments: Abdominal incisions clean/dry/intact, abdomen appropriately tender   Genitourinary:     Comments: Mishra in place with clear yellow urine  Musculoskeletal:         General: Normal range of motion.      Cervical back: Normal range of motion.   Skin:     Coloration: Skin is not jaundiced.   Neurological:      General: No focal deficit present.      Mental Status: She is alert and oriented to person, place, and time.   Psychiatric:         Mood and Affect: Mood normal.         Behavior: Behavior normal.           Significant Labs:    BMP:  Recent Labs   Lab 04/25/24  0514      K 4.0      CO2 25   BUN 16   CREATININE 0.9   CALCIUM 9.0       CBC:   Recent Labs   Lab 04/25/24  0514   WBC 9.67   HGB 11.3*   HCT 33.8*          Recent Lab Results         04/25/24  0514        Anion Gap  8       Baso # 0.02       Basophil % 0.2       BUN 16       Calcium 9.0       Chloride 105       CO2 25       Creatinine 0.9       Differential Method Automated       eGFR >60       Eos # 0.0       Eos % 0.1       Glucose 107       Gran # (ANC) 6.9       Gran % 71.0       Hematocrit 33.8       Hemoglobin 11.3       Immature Grans (Abs) 0.04  Comment: Mild elevation in immature granulocytes is non specific and   can be seen in a variety of conditions including stress response,   acute inflammation, trauma and pregnancy. Correlation with other   laboratory and clinical findings is essential.         Immature Granulocytes 0.4       Lymph # 1.9       Lymph % 19.4       MCH 32.1       MCHC 33.4       MCV 96       Mono # 0.9       Mono % 8.9       MPV 9.1       nRBC 0       Platelet Count 251       Potassium 4.0       RBC 3.52       RDW 14.1       Sodium 138       WBC 9.67               Significant Imaging:  All pertinent imaging results/findings from the past 24 hours have been reviewed.

## 2024-04-25 NOTE — ANESTHESIA POSTPROCEDURE EVALUATION
Anesthesia Post Evaluation    Patient: Chely Kramer    Procedure(s) Performed: Procedure(s) (LRB):  ROBOTIC NEPHRECTOMY (Left)    Final Anesthesia Type: general      Patient location during evaluation: PACU  Patient participation: Yes- Able to Participate  Level of consciousness: awake and alert, oriented and awake  Post-procedure vital signs: reviewed and stable  Pain management: adequate  Airway patency: patent    PONV status at discharge: No PONV  Anesthetic complications: no      Cardiovascular status: stable  Respiratory status: unassisted and room air  Hydration status: euvolemic  Follow-up not needed.              Vitals Value Taken Time   BP 90/67 04/25/24 0737   Temp 36.7 °C (98.1 °F) 04/25/24 0737   Pulse 72 04/25/24 0820   Resp 19 04/25/24 0820   SpO2 97 % 04/25/24 0820         Event Time   Out of Recovery 11:56:47         Pain/Emily Score: Pain Rating Prior to Med Admin: 4 (4/24/2024  9:12 PM)  Pain Rating Post Med Admin: 3 (4/24/2024 10:12 PM)  Emily Score: 9 (4/24/2024 11:20 AM)

## 2024-04-25 NOTE — PROGRESS NOTES
[facility] Surg  Urology  Progress Note    Patient Name: Chely Kramer  MRN: [id number]  Admission Date: 4/24/2024  Hospital Length of Stay: 1 days  Code Status: Prior   Attending Provider: Temo Hodge MD   Primary Care Physician: Boubacar Mcfadden MD    Subjective:     HPI:  Ms. Kramer is a 73-year-old female who is now status post left radical robotic nephrectomy on 04/24/2024.    Interval History:  No acute events overnight.  Pain well controlled this morning.  Ambulated halls last night without complication.  Tolerated p.o. diet without nausea or vomiting.  No subjective fever or chills this morning.  Labs remained stable.  On 900 cc of urine output overnight.      Objective:     Temp:  [97.3 °F (36.3 °C)-98.3 °F (36.8 °C)] 97.8 °F (36.6 °C)  Pulse:  [58-77] 62  Resp:  [12-20] 18  SpO2:  [90 %-100 %] 96 %  BP: (126-184)/(59-79) 147/63     Body mass index is 29.37 kg/m².           Drains       Drain  Duration                  Urethral Catheter 04/24/24 0822 Non-latex;Straight-tip 16 Fr. <1 day                     Physical Exam  Vitals and nursing note reviewed.   Constitutional:       General: She is not in acute distress.  HENT:      Head: Atraumatic.      Nose: Nose normal.   Eyes:      Extraocular Movements: Extraocular movements intact.   Cardiovascular:      Rate and Rhythm: Normal rate.   Pulmonary:      Effort: Pulmonary effort is normal.   Abdominal:      General: Abdomen is flat. There is no distension.      Tenderness: There is no right CVA tenderness or left CVA tenderness.      Comments: Abdominal incisions clean/dry/intact, abdomen appropriately tender   Genitourinary:     Comments: Mishra in place with clear yellow urine  Musculoskeletal:         General: Normal range of motion.      Cervical back: Normal range of motion.   Skin:     Coloration: Skin is not jaundiced.   Neurological:      General: No focal deficit present.      Mental Status: She is alert and oriented to person, place, and  time.   Psychiatric:         Mood and Affect: Mood normal.         Behavior: Behavior normal.           Significant Labs:    BMP:  Recent Labs   Lab 04/25/24  0514      K 4.0      CO2 25   BUN 16   CREATININE 0.9   CALCIUM 9.0       CBC:   Recent Labs   Lab 04/25/24  0514   WBC 9.67   HGB 11.3*   HCT 33.8*          Recent Lab Results         04/25/24  0514        Anion Gap 8       Baso # 0.02       Basophil % 0.2       BUN 16       Calcium 9.0       Chloride 105       CO2 25       Creatinine 0.9       Differential Method Automated       eGFR >60       Eos # 0.0       Eos % 0.1       Glucose 107       Gran # (ANC) 6.9       Gran % 71.0       Hematocrit 33.8       Hemoglobin 11.3       Immature Grans (Abs) 0.04  Comment: Mild elevation in immature granulocytes is non specific and   can be seen in a variety of conditions including stress response,   acute inflammation, trauma and pregnancy. Correlation with other   laboratory and clinical findings is essential.         Immature Granulocytes 0.4       Lymph # 1.9       Lymph % 19.4       MCH 32.1       MCHC 33.4       MCV 96       Mono # 0.9       Mono % 8.9       MPV 9.1       nRBC 0       Platelet Count 251       Potassium 4.0       RBC 3.52       RDW 14.1       Sodium 138       WBC 9.67               Significant Imaging:  All pertinent imaging results/findings from the past 24 hours have been reviewed.                  Assessment/Plan:     * Renal mass  - Ambulate qid  - Strict I's/O's  - Drains:  Discontinue Mishra catheter, follow up voiding trial  - regular diet  - MIVF  - Abx:  Ancef  - Home meds  - Pain control  - PPx: SCD's/BO's/IS  - Dispo:  Plan for discharge home following toleration of breakfast this morning, ambulation and halls, and successful passage of voiding trial         VTE Risk Mitigation (From admission, onward)           Ordered     IP VTE HIGH RISK PATIENT  Once         04/24/24 1208     Place sequential compression device   Until discontinued         04/24/24 8707                    Ismael Garcia MD  Urology  [facility] Surg

## 2024-04-25 NOTE — PLAN OF CARE
VN note: VN completed AVS and attachments and notified bedside nurse, Sandip. Will cont to be available and intervene prn.

## 2024-04-25 NOTE — DISCHARGE SUMMARY
[facility] Surg  Urology  Discharge Summary      Patient Name: Chely Kramer  MRN: [id number]  Admission Date: 4/24/2024  Hospital Length of Stay: 1 days  Discharge Date and Time: 04/25/2024 12:56 PM  Attending Physician: Temo Hodge MD  Discharging Provider: Ismael Garcia MD  Primary Care Physician: Boubacar Mcfadden MD    HPI:   Ms. Kramer is a 73-year-old female who is now status post left radical robotic nephrectomy on 04/24/2024.     Procedure(s) (LRB):  ROBOTIC NEPHRECTOMY (Left)     Indwelling Lines/Drains at time of discharge:   Lines/Drains/Airways       Drain  Duration                  Urethral Catheter 04/24/24 0822 Non-latex;Straight-tip 16 Fr. <1 day                    Hospital Course (synopsis of major diagnoses, care, treatment, and services provided during the course of the hospital stay):  The patient was admitted to [facility] for the above procedure. Patient tolerated the procedure well in its entirety without issue. For more details, please refer to the complete operative note. she was transferred to recovery post-op and then to the floor. Once on the floor Her diet was advanced and she ambulated the night of and day after surgery. On POD 1 the patient was tolerating a regular diet, ambulating without difficulty.Her pain was well controlled.    Physical exam was appropriate for post operative state. The incisions were clean, dry and intact. The guo was draining clear yellow urine. The guo was removed and she was able to void without difficulty. The patient was deemed stable for discharge on 04/25/2024.  .     Medications and instructions as below.  For more thorough information, please refer to the hospital record and operative report.    Consults:     Significant Diagnostic Studies:     Pending Diagnostic Studies:       Procedure Component Value Units Date/Time    Specimen to Pathology, Surgery Urology [[id number]] Collected: 04/24/24 1026    Order Status: Sent Lab Status: In process  Updated: 04/24/24 1348    Specimen: Tissue             Final Active Diagnoses:    Diagnosis Date Noted POA    PRINCIPAL PROBLEM:  Renal mass [N28.89] 02/21/2024 Yes      Problems Resolved During this Admission:       Discharged Condition: good    Disposition: Home or Self Care    Follow Up:      Patient Instructions:      No dressing needed     Notify your health care provider if you experience any of the following:  temperature >100.4     Notify your health care provider if you experience any of the following:  persistent nausea and vomiting or diarrhea     Notify your health care provider if you experience any of the following:  severe uncontrolled pain     Notify your health care provider if you experience any of the following:  difficulty breathing or increased cough     Notify your health care provider if you experience any of the following:  severe persistent headache     Notify your health care provider if you experience any of the following:  worsening rash     Notify your health care provider if you experience any of the following:  persistent dizziness, light-headedness, or visual disturbances     Activity as tolerated     Medications:  Reconciled Home Medications:      Medication List        START taking these medications      ibuprofen 800 MG tablet  Commonly known as: ADVIL,MOTRIN  Take 1 tablet (800 mg total) by mouth 3 (three) times daily.     oxyCODONE-acetaminophen 5-325 mg per tablet  Commonly known as: PERCOCET  Take 1 tablet by mouth every 6 (six) hours as needed for Pain.     polyethylene glycol 17 gram/dose powder  Commonly known as: GLYCOLAX  Take 17 g by mouth once daily.            CHANGE how you take these medications      * acetaminophen 325 MG tablet  Commonly known as: TYLENOL  Take 2 tablets (650 mg total) by mouth every 6 (six) hours as needed for Pain.  What changed: Another medication with the same name was added. Make sure you understand how and when to take each.     *  acetaminophen 650 MG Tbsr  Commonly known as: TYLENOL  Take 1 tablet (650 mg total) by mouth every 8 (eight) hours.  What changed: You were already taking a medication with the same name, and this prescription was added. Make sure you understand how and when to take each.           * This list has 2 medication(s) that are the same as other medications prescribed for you. Read the directions carefully, and ask your doctor or other care provider to review them with you.                CONTINUE taking these medications      ascorbic acid (vitamin C) 500 MG tablet  Commonly known as: VITAMIN C  Take 2,500 mg by mouth once daily.     brimonidine 0.2% 0.2 % Drop  Commonly known as: ALPHAGAN  Place 1 drop into both eyes 2 (two) times daily.     latanoprost 0.005 % ophthalmic solution  Place 1 drop into both eyes every evening.     pantoprazole 40 MG tablet  Commonly known as: PROTONIX  Take 1 tablet (40 mg total) by mouth once daily.     traZODone 50 MG tablet  Commonly known as: DESYREL  Take 1/2-1 tablet nightly as needed for insomnia.              Time spent on the discharge of patient: 15 minutes    Ismael Garcia MD  Urology  [facility] Surg

## 2024-04-25 NOTE — PLAN OF CARE
Pt on RA. Pt getting 1000mL on incentive spirometry. Pt encouraged to continue deep breathing and coughing. No apparent distress noted. Will continue to monitor

## 2024-04-25 NOTE — PLAN OF CARE
SW met with pt at bedside this AM to complete DCA. Pt stated that she lives at home alone but will have her sister Anastasiya help transport her home later today. Pt does not use any HME at home and still works full time. Pt did not have any additional questions for sw at this time. Swa requested f/u appts for pt. White board updated with CM name and contact information.  Discharge brochure provided.  Pt encouraged to call with any questions or concerns.  Cm will continue to follow pt through transitions of care and assist with any discharge needs.    Fercho Barnard, MARJAN  [phone]    Future Appointments   Date Time Provider Department Center   5/10/2024 10:15 AM Temo Hodge MD [facility] UROLOGY [facility]   5/20/2024 11:00 AM Tracee Thurman MD [facility]        04/25/24 1010   Discharge Assessment   Assessment Type Discharge Planning Assessment   Confirmed/corrected address, phone number and insurance Yes   Confirmed Demographics Correct on Facesheet   Source of Information patient   When was your last doctors appointment?   ([id number])   Communicated ALEJANDRO with patient/caregiver Yes   Reason For Admission renal mass   People in Home alone   Facility Arrived From: home   Do you expect to return to your current living situation? Yes   Do you have help at home or someone to help you manage your care at home? Yes   Who are your caregiver(s) and their phone number(s)? sister Anastasiya   Prior to hospitilization cognitive status: Alert/Oriented   Current cognitive status: Alert/Oriented   Walking or Climbing Stairs Difficulty no   Dressing/Bathing Difficulty no   Do you have any problems with: Errands/Grocery   Home Accessibility wheelchair accessible   Home Layout Able to live on 1st floor   Equipment Currently Used at Home none   Readmission within 30 days? No   Patient currently being followed by outpatient case management? No   Do you currently have service(s) that help you manage your care at home? No    Is the pt/caregiver preference to resume services with current agency Yes   Do you take prescription medications? Yes   Do you have prescription coverage? Yes   Coverage [employer]   Do you have any problems affording any of your prescribed medications? No   Is the patient taking medications as prescribed? yes   Who is going to help you get home at discharge? sister   How do you get to doctors appointments? car, drives self   Are you on dialysis? No   Do you take coumadin? No   Discharge Plan A Home with family   DME Needed Upon Discharge  none   Discharge Plan discussed with: Patient;Sibling   Name(s) and Number(s) sister   Transition of Care Barriers None

## 2024-04-26 ENCOUNTER — PATIENT OUTREACH (OUTPATIENT)
Dept: ADMINISTRATIVE | Facility: CLINIC | Age: 74
End: 2024-04-26
Payer: MEDICARE

## 2024-04-26 NOTE — PROGRESS NOTES
C3 nurse attempted to contact patient for a TCC post hospital discharge follow-up call. The patient declined call at this time.     Pt voiced call unneeded

## 2024-04-30 DIAGNOSIS — Z00.00 ENCOUNTER FOR MEDICARE ANNUAL WELLNESS EXAM: ICD-10-CM

## 2024-05-03 LAB
FINAL PATHOLOGIC DIAGNOSIS: NORMAL
GROSS: NORMAL
Lab: NORMAL
MICROSCOPIC EXAM: NORMAL

## 2024-05-09 PROBLEM — C64.2 RENAL CELL CARCINOMA OF LEFT KIDNEY: Status: ACTIVE | Noted: 2024-05-09

## 2024-05-09 NOTE — PROGRESS NOTES
[facility] - Urology   Clinic Note    SUBJECTIVE:     No chief complaint on file.      Referral from: No ref. provider found.    History of Present Illness:  Chely Kramer is a 73 y.o. female who presents to clinic for left renal mass.    Patient had incidental finding of 4.3cm LLP posterior renal mass concerning for RCC during hospitalization for pelvic abscess related to diverticulitis. She is s/p IR drainage of the pelvic abscess.     Denies hematuria. Former smoker, quit 30 years ago though continued to have second-hand smoke exposure at her job. She reports her sister had part of her kidney removed for a growth a few years ago, she is unsure of pathology.     Prior surgical history with ruptured appendicitis complicated by wound issues and eventual ventral hernia repair with mesh.      Patient works as a  on [employer].     04/18/2024  Here for preop    05/09/2024  S/p left radical nephrectomy  Doing well  Incisions healing well  Path:  Final Pathologic Diagnosis 1. Left kidney, radical nephrectomy:  Clear cell renal cell carcinoma, WHO/ISUP grade 2, measuring 4.0 cm in greatest dimension, confined to the kidney.  Surgical margins are negative for carcinoma.  Uninvolved kidney with simple cyst, measuring 0.6 cm in greatest dimension.       Patient endorses no additional complaints at this time.    Past Medical History:   Diagnosis Date    Glaucoma        Past Surgical History:   Procedure Laterality Date    ADENOIDECTOMY      APPENDECTOMY  1998    Ruptured    BREAST BIOPSY      BREAST CYST EXCISION      HERNIA REPAIR Right 2003    Ventral    INCISION AND DRAINAGE OF HAND Left 1/22/2019    Procedure: INCISION AND DRAINAGE, HAND (ADD ON );  Surgeon: Sameer Evans MD;  Location: [facility] OR;  Service: Orthopedics;  Laterality: Left;  AXILLARY BLOCK  (ADD ON )    NEEDLE LOCALIZATION N/A 2/22/2024    Procedure: ABSCESS DRAINAGE;  Surgeon: Fernando Thurman MD;  Location: [facility] CATH LAB;  Service: Radiology;   Laterality: N/A;    ROBOT-ASSISTED LAPAROSCOPIC NEPHRECTOMY Left 2024    Procedure: ROBOTIC NEPHRECTOMY;  Surgeon: Temo Hodge MD;  Location: [facility];  Service: Urology;  Laterality: Left;    TONSILLECTOMY         Family History   Problem Relation Name Age of Onset    Heart disease Father  74        CABG    Parkinsonism Brother      Mental illness Brother          schizophrenia       Social History     Tobacco Use    Smoking status: Former     Current packs/day: 0.00     Types: Cigarettes     Quit date: 5/3/1992     Years since quittin.0    Smokeless tobacco: Never   Substance Use Topics    Alcohol use: Yes     Alcohol/week: 4.0 standard drinks of alcohol     Types: 4 Shots of liquor per week    Drug use: No       Current Outpatient Medications on File Prior to Visit   Medication Sig Dispense Refill    acetaminophen (TYLENOL) 325 MG tablet Take 2 tablets (650 mg total) by mouth every 6 (six) hours as needed for Pain.  0    acetaminophen (TYLENOL) 650 MG TbSR Take 1 tablet (650 mg total) by mouth every 8 (eight) hours. 90 tablet 0    ascorbic acid, vitamin C, (VITAMIN C) 500 MG tablet Take 2,500 mg by mouth once daily.      brimonidine 0.2% (ALPHAGAN) 0.2 % Drop Place 1 drop into both eyes 2 (two) times daily.      ibuprofen (ADVIL,MOTRIN) 800 MG tablet Take 1 tablet (800 mg total) by mouth 3 (three) times daily. 90 tablet 0    latanoprost 0.005 % ophthalmic solution Place 1 drop into both eyes every evening.      oxyCODONE-acetaminophen (PERCOCET) 5-325 mg per tablet Take 1 tablet by mouth every 6 (six) hours as needed for Pain. 15 tablet 0    pantoprazole (PROTONIX) 40 MG tablet Take 1 tablet (40 mg total) by mouth once daily. 30 tablet 1    polyethylene glycol (GLYCOLAX) 17 gram/dose powder Take 17 g by mouth once daily. 510 g 0    traZODone (DESYREL) 50 MG tablet Take 1/2-1 tablet nightly as needed for insomnia. 30 tablet 2     No current facility-administered medications on file prior to visit.  "      Review of patient's allergies indicates:   Allergen Reactions    Ciprofloxacin (bulk) Hives    Flagyl [metronidazole] Hives       Review of Systems:  A review of 10+ systems was conducted with pertinent positive and negative findings documented in HPI with all other systems reviewed and negative.    OBJECTIVE:     Estimated body mass index is 29.37 kg/m² as calculated from the following:    Height as of 4/24/24: 5' 1" (1.549 m).    Weight as of 4/24/24: 70.5 kg (155 lb 6.8 oz).    Vital Signs (Most Recent)  There were no vitals filed for this visit.    Physical Exam:  GENERAL: patient sitting comfortably  HEENT: normocephalic  NECK: supple, no JVD  PULM: normal chest rise, no increased WOB  HEART: non-diaphoretic  ABDO: soft, nondistended, nontender  BACK: no CVA tenderness bilaterally  SKIN: warm, dry, well perfused  EXT: no bruising or edema  NEURO: grossly normal with no focal deficits  PSYCH: appropriate mood and affect    Genitourinary Exam:  Incisions healing well    Lab Results   Component Value Date    BUN 16 04/25/2024    CREATININE 0.9 04/25/2024    WBC 9.67 04/25/2024    HGB 11.3 (L) 04/25/2024    HCT 33.8 (L) 04/25/2024     04/25/2024    AST 15 03/13/2024    ALT 15 03/13/2024    ALKPHOS 96 03/13/2024    ALBUMIN 3.6 03/13/2024    HGBA1C 5.1 01/18/2024    INR 1.1 02/22/2024        Imaging:  I have personally reviewed all relevant imaging studies.    Results for orders placed or performed during the hospital encounter of 02/21/24 (from the past 2160 hour(s))   CT Abdomen Pelvis  Without Contrast    Narrative    EXAMINATION:  CT ABDOMEN PELVIS WITHOUT CONTRAST    CLINICAL HISTORY:  Abdominal abscess/infection suspected;    TECHNIQUE:  Low dose axial images, sagittal and coronal reformations were obtained from the lung bases to the pubic symphysis.  Oral contrast was not administered.    COMPARISON:  02/21/2024    FINDINGS:  Limited evaluation of the structures at the base of the chest show no " concerning masses or nodules.    The liver is normal in size.  No solid mass noting lack of IV contrast.  No biliary ductal dilatation.  Gallbladder is unremarkable.    Spleen, adrenal glands, and pancreas show no focal abnormalities noting lack of IV contrast.    The bilateral kidneys are unchanged in size and position.  The posterior lower pole mass of the left kidney is unchanged from recent prior exam, not as well evaluated today without IV contrast.  No hydronephrosis or nephrolithiasis.  The urinary bladder shows no focal wall thickening.    On going sigmoid diverticulitis.  No evidence of obstruction.  There is a left trans gluteal approach percutaneous drain in place with apparent resolution of the previously identified complex abscess.  There remains moderate amount of inflammatory stranding in the dependent pelvis.  Small hiatal hernia is present.  There is no free gas.  No additional organized or drainable fluid collections.  No significant lymphadenopathy.  Surgical changes to the right abdominal wall unchanged from prior.    Vascular structures show mild atherosclerosis without aneurysm.  Degenerative change of the spine without an acute fracture or destructive osseous lesion.      Impression    Apparent resolution of the previously identified deep pelvic abscess with percutaneous drain in place.    Ongoing sigmoid diverticulitis.  There is inflammatory stranding in the dependent pelvis but no additional drainable fluid collection.    Left renal mass better evaluated on the recent contrasted exam.  Additional findings as above stable from prior.      Electronically signed by: Michael Brown MD  Date:    02/23/2024  Time:    16:50   CT Abdomen Pelvis With IV Contrast NO Oral Contrast    Narrative    EXAMINATION:  CT ABDOMEN PELVIS WITH IV CONTRAST    CLINICAL HISTORY:  LLQ abdominal pain;    TECHNIQUE:  Low dose axial images, sagittal and coronal reformations were obtained from the lung bases to the  pubic symphysis following the IV administration of 75 mL of Omnipaque 350 .  Oral contrast was not given.    COMPARISON:  02/15/2024    FINDINGS:  Heart is mildly enlarged.  Mild basilar atelectatic changes versus scarring.    Small hiatal hernia.  Otherwise, stomach shows no significant abnormalities.    Liver is homogeneous in attenuation with no focal liver lesions.  Spleen, pancreas and right adrenal gland shows no significant abnormalities.  Mild left adrenal gland thickening without discrete nodule.    There is a 2.4 cm right renal hypodensity likely a cyst.  Minimal dilatation of the right collecting system.  There is a solid-appearing mass involving the left kidney.  This extends into the renal pelvis but also has an exophytic component.  This measures 4.1 x 3.3 x 4.3 cm.  No left-sided hydronephrosis.  Left renal vein appears patent.    Urinary bladder and uterus are displaced anteriorly by the pelvic abscess.    There is extensive colonic diverticulosis with wall thickening involving the sigmoid colon likely relating to chronic diverticulitis.  There are inflammatory changes in the pelvis with a 5.1 x 3.4 x 5.0 cm rim enhancing collection in the pelvis worrisome for abscess.  Appendix shows no significant abnormalities.  No free air.  There are postoperative changes of right lower abdominal hernia repair.    Abdominal aorta is normal in caliber with moderate atherosclerosis.  Several normal sized periaortic lymph nodes.  No abnormal lymph node enlargement.    Osseous structures show degenerative changes.  No acute osseous abnormalities.      Impression    Extensive colonic diverticulosis with findings worrisome for acute on chronic sigmoid diverticulitis with focal abscess in the deep pelvis.    4.3 cm solid left renal mass highly worrisome for renal cell neoplasm.  Recommend close follow-up with urology.    This report was flagged in Epic as abnormal.      Electronically signed by: Christopher Angeles  "MD  Date:    02/21/2024  Time:    13:05     No results found for this or any previous visit (from the past 2160 hour(s)).  X-Ray Chest 1 View  Narrative: EXAMINATION:  XR CHEST 1 VIEW    CLINICAL HISTORY:  pre op exam;    TECHNIQUE:  Single frontal view of the chest was performed.    COMPARISON:  None    FINDINGS:  The lungs are clear with normal appearance of pulmonary vasculature. No pleural effusion. No evident pneumothorax.    The cardiac silhouette is normal in size. The hilar and mediastinal contours are unremarkable.    Degenerative changes identified LEFT glenohumeral joint with severe joint space narrowing in marginal osteophytosis/ossific bodies.  Impression: No acute abnormality.    Electronically signed by: Krish Patel MD  Date:    04/24/2024  Time:    07:10       PSA:  No results found for: "PSA", "PSADIAG", "PSATOTAL", "PSAFREE"    Testosterone:  No results found for: "TOTALTESTOST", "TESTOSTERONE"     ASSESSMENT     1. Left renal mass    2. Renal cell carcinoma of left kidney        PLAN:     Path: RCC, Stage I (cT1a, cN0, cM0)   Tumor low risk, needs CT abd/pelv and CXR in April 2025  Follow up in 6 months        Temo Hodge MD  Urology  [facility]    Disclaimer: This note has been generated using voice-recognition software. There may be typographical errors that have been missed during proof-reading.     "

## 2024-05-10 ENCOUNTER — OFFICE VISIT (OUTPATIENT)
Dept: UROLOGY | Facility: CLINIC | Age: 74
End: 2024-05-10
Payer: MEDICARE

## 2024-05-10 VITALS
HEART RATE: 76 BPM | DIASTOLIC BLOOD PRESSURE: 74 MMHG | HEIGHT: 61 IN | SYSTOLIC BLOOD PRESSURE: 130 MMHG | BODY MASS INDEX: 27.6 KG/M2 | WEIGHT: 146.19 LBS

## 2024-05-10 DIAGNOSIS — N28.89 LEFT RENAL MASS: Primary | ICD-10-CM

## 2024-05-10 DIAGNOSIS — C64.2 RENAL CELL CARCINOMA OF LEFT KIDNEY: ICD-10-CM

## 2024-05-10 PROCEDURE — 3044F HG A1C LEVEL LT 7.0%: CPT | Mod: HCNC,CPTII,S$GLB, | Performed by: UROLOGY

## 2024-05-10 PROCEDURE — 3078F DIAST BP <80 MM HG: CPT | Mod: HCNC,CPTII,S$GLB, | Performed by: UROLOGY

## 2024-05-10 PROCEDURE — 3075F SYST BP GE 130 - 139MM HG: CPT | Mod: HCNC,CPTII,S$GLB, | Performed by: UROLOGY

## 2024-05-10 PROCEDURE — 1101F PT FALLS ASSESS-DOCD LE1/YR: CPT | Mod: HCNC,CPTII,S$GLB, | Performed by: UROLOGY

## 2024-05-10 PROCEDURE — 99999 PR PBB SHADOW E&M-EST. PATIENT-LVL III: CPT | Mod: PBBFAC,HCNC,, | Performed by: UROLOGY

## 2024-05-10 PROCEDURE — 1159F MED LIST DOCD IN RCRD: CPT | Mod: HCNC,CPTII,S$GLB, | Performed by: UROLOGY

## 2024-05-10 PROCEDURE — 99024 POSTOP FOLLOW-UP VISIT: CPT | Mod: HCNC,S$GLB,, | Performed by: UROLOGY

## 2024-05-10 PROCEDURE — 1126F AMNT PAIN NOTED NONE PRSNT: CPT | Mod: HCNC,CPTII,S$GLB, | Performed by: UROLOGY

## 2024-05-10 PROCEDURE — 3288F FALL RISK ASSESSMENT DOCD: CPT | Mod: HCNC,CPTII,S$GLB, | Performed by: UROLOGY

## 2024-05-16 ENCOUNTER — TELEPHONE (OUTPATIENT)
Dept: SURGERY | Facility: CLINIC | Age: 74
End: 2024-05-16
Payer: MEDICARE

## 2024-05-19 ENCOUNTER — TELEPHONE (OUTPATIENT)
Dept: SURGERY | Facility: CLINIC | Age: 74
End: 2024-05-19
Payer: MEDICARE

## 2024-05-19 NOTE — TELEPHONE ENCOUNTER
Attempted to contact pt regarding appt on 5/20. No answer. Left voicemail requesting callback. Message sent on pt portal regarding appt rescheduling. CRS number provided.

## 2024-05-23 ENCOUNTER — TELEPHONE (OUTPATIENT)
Dept: SURGERY | Facility: CLINIC | Age: 74
End: 2024-05-23
Payer: MEDICARE

## 2024-05-27 ENCOUNTER — OFFICE VISIT (OUTPATIENT)
Dept: SURGERY | Facility: CLINIC | Age: 74
End: 2024-05-27
Payer: MEDICARE

## 2024-05-27 VITALS
SYSTOLIC BLOOD PRESSURE: 140 MMHG | OXYGEN SATURATION: 95 % | WEIGHT: 145.94 LBS | HEIGHT: 61 IN | RESPIRATION RATE: 19 BRPM | BODY MASS INDEX: 27.55 KG/M2 | HEART RATE: 78 BPM | DIASTOLIC BLOOD PRESSURE: 64 MMHG

## 2024-05-27 DIAGNOSIS — K57.20 DIVERTICULITIS OF LARGE INTESTINE WITH ABSCESS WITHOUT BLEEDING: Primary | ICD-10-CM

## 2024-05-27 PROCEDURE — 99999 PR PBB SHADOW E&M-EST. PATIENT-LVL III: CPT | Mod: PBBFAC,HCNC,, | Performed by: SURGERY

## 2024-05-27 PROCEDURE — 3044F HG A1C LEVEL LT 7.0%: CPT | Mod: HCNC,CPTII,S$GLB, | Performed by: SURGERY

## 2024-05-27 PROCEDURE — 3078F DIAST BP <80 MM HG: CPT | Mod: HCNC,CPTII,S$GLB, | Performed by: SURGERY

## 2024-05-27 PROCEDURE — 1101F PT FALLS ASSESS-DOCD LE1/YR: CPT | Mod: HCNC,CPTII,S$GLB, | Performed by: SURGERY

## 2024-05-27 PROCEDURE — 1159F MED LIST DOCD IN RCRD: CPT | Mod: HCNC,CPTII,S$GLB, | Performed by: SURGERY

## 2024-05-27 PROCEDURE — 3077F SYST BP >= 140 MM HG: CPT | Mod: HCNC,CPTII,S$GLB, | Performed by: SURGERY

## 2024-05-27 PROCEDURE — 99213 OFFICE O/P EST LOW 20 MIN: CPT | Mod: HCNC,S$GLB,, | Performed by: SURGERY

## 2024-05-27 PROCEDURE — 1126F AMNT PAIN NOTED NONE PRSNT: CPT | Mod: HCNC,CPTII,S$GLB, | Performed by: SURGERY

## 2024-05-27 PROCEDURE — 3288F FALL RISK ASSESSMENT DOCD: CPT | Mod: HCNC,CPTII,S$GLB, | Performed by: SURGERY

## 2024-05-27 NOTE — PROGRESS NOTES
[facility] Clinic Follow Up    HPI:   Chely Kramer is a 73 y.o. female who presents for follow up of recurrent diverticulitis     Interval history:   Underwent robotic radical left nephrectomy with Dr. Hodge 4/24/2024.  Pathology showed a [id number] clear cell renal carcinoma.  Reports that she has returned to work and feels recovered from her surgery.       Objective:   Vitals:    05/27/24 0936   BP: (!) 140/64   Pulse: 78   Resp: 19        Physical Exam  Vitals reviewed.   Constitutional:       Appearance: Normal appearance.   HENT:      Head: Normocephalic and atraumatic.      Mouth/Throat:      Mouth: Mucous membranes are moist.   Eyes:      Extraocular Movements: Extraocular movements intact.      Pupils: Pupils are equal, round, and reactive to light.   Cardiovascular:      Rate and Rhythm: Normal rate and regular rhythm.   Pulmonary:      Effort: Pulmonary effort is normal.   Abdominal:      Comments: Broad based right lower quadrant scar, well healed left abdominal incisions    Musculoskeletal:         General: Normal range of motion.   Skin:     General: Skin is warm.      Capillary Refill: Capillary refill takes less than 2 seconds.   Neurological:      General: No focal deficit present.      Mental Status: She is alert and oriented to person, place, and time.   Psychiatric:         Mood and Affect: Mood normal.         Behavior: Behavior normal.          Assessment and Plan:   Chely Kramer  is a 73 y.o. female who presents for follow up of recurrent diverticulitis with recent admission for pelvic abscess requiring IR drainage     - Patient recovering well after left nephrectomy with pT1a clear cell cancer.    - no evidence of recurrent diverticulitis at this time, we discussed indication for surgery given numerous episodes and recent complicated episode requiring IR drain.  However, I would like her to recover further from her most recent surgery.    - Follow up in 1 month for surgical  planning  - return precautions discussed       Tracee Thurman MD  Staff Surgeon   Colon & Rectal Surgery

## 2024-06-13 NOTE — TELEPHONE ENCOUNTER
----- Message from Clarita Carl sent at 3/13/2024 11:47 AM CDT -----  Type:  Patient Returning Call    Who Called:pt  Who Left Message for Patient:office  Does the patient know what this is regarding?:appt 3/13  Would the patient rather a call back or a response via [employer]? call  Best Call Back Number: [phone]  Additional Information:          [[phone]]

## 2024-06-28 ENCOUNTER — OFFICE VISIT (OUTPATIENT)
Dept: SURGERY | Facility: CLINIC | Age: 74
End: 2024-06-28
Payer: MEDICARE

## 2024-06-28 VITALS
HEIGHT: 61 IN | BODY MASS INDEX: 27.6 KG/M2 | RESPIRATION RATE: 19 BRPM | HEART RATE: 67 BPM | OXYGEN SATURATION: 95 % | DIASTOLIC BLOOD PRESSURE: 68 MMHG | WEIGHT: 146.19 LBS | SYSTOLIC BLOOD PRESSURE: 150 MMHG

## 2024-06-28 DIAGNOSIS — K57.20 DIVERTICULITIS OF LARGE INTESTINE WITH ABSCESS WITHOUT BLEEDING: Primary | ICD-10-CM

## 2024-06-28 PROCEDURE — 99999 PR PBB SHADOW E&M-EST. PATIENT-LVL IV: CPT | Mod: PBBFAC,HCNC,, | Performed by: SURGERY

## 2024-06-28 NOTE — PROGRESS NOTES
[facility] Rectal Surgery Clinic Follow Up    HPI:   Chely Kramer is a 73 y.o. female who presents for follow up of complicated diverticulitis     Interval history:   Doing well post-operatively from left nephrectomy.  Continues to work.  Having bowel function and tolerating diet.       Objective:   Vitals:    06/28/24 0828   BP: (!) 150/68   Pulse: 67   Resp: 19        Physical Exam  Vitals reviewed.   Constitutional:       Appearance: Normal appearance.   HENT:      Head: Normocephalic and atraumatic.      Mouth/Throat:      Mouth: Mucous membranes are moist.   Eyes:      Extraocular Movements: Extraocular movements intact.      Pupils: Pupils are equal, round, and reactive to light.   Cardiovascular:      Rate and Rhythm: Normal rate and regular rhythm.   Pulmonary:      Effort: Pulmonary effort is normal.   Abdominal:      Palpations: Abdomen is soft.      Comments:  Broad based right lower quadrant scar, well healed left abdominal incisions     Musculoskeletal:         General: Normal range of motion.   Skin:     General: Skin is warm.      Capillary Refill: Capillary refill takes less than 2 seconds.   Neurological:      General: No focal deficit present.      Mental Status: She is alert and oriented to person, place, and time.   Psychiatric:         Mood and Affect: Mood normal.         Behavior: Behavior normal.           Assessment and Plan:   Chely Kramer  is a 73 y.o. female who presents for follow up of complicated diverticulitis     - Patient doing well without evidence of recurrent diverticulitis   - We reviewed return precautions.    - We briefly discussed surgery and would want to wait at least 3 months from robotic nephrectomy to proceed with sigmoid colectomy.  Discussed that surgery is elective and can be scheduled at any time that is convenient for the patient.    - She would like to tentatively plan for 8/20.    - Follow up in early August for pre-op appointment.       Tracee Thurman MD  Staff  Surgeon   Colon & Rectal Surgery

## 2024-08-02 ENCOUNTER — OFFICE VISIT (OUTPATIENT)
Dept: SURGERY | Facility: CLINIC | Age: 74
End: 2024-08-02
Payer: MEDICARE

## 2024-08-02 VITALS
SYSTOLIC BLOOD PRESSURE: 132 MMHG | WEIGHT: 146.38 LBS | HEIGHT: 61 IN | OXYGEN SATURATION: 95 % | BODY MASS INDEX: 27.64 KG/M2 | DIASTOLIC BLOOD PRESSURE: 61 MMHG | RESPIRATION RATE: 19 BRPM | HEART RATE: 81 BPM

## 2024-08-02 DIAGNOSIS — K57.20 DIVERTICULITIS OF LARGE INTESTINE WITH ABSCESS WITHOUT BLEEDING: Primary | ICD-10-CM

## 2024-08-02 PROCEDURE — 1159F MED LIST DOCD IN RCRD: CPT | Mod: HCNC,CPTII,S$GLB, | Performed by: SURGERY

## 2024-08-02 PROCEDURE — 99214 OFFICE O/P EST MOD 30 MIN: CPT | Mod: HCNC,S$GLB,, | Performed by: SURGERY

## 2024-08-02 PROCEDURE — 99999 PR PBB SHADOW E&M-EST. PATIENT-LVL III: CPT | Mod: PBBFAC,HCNC,, | Performed by: SURGERY

## 2024-08-02 PROCEDURE — 1101F PT FALLS ASSESS-DOCD LE1/YR: CPT | Mod: HCNC,CPTII,S$GLB, | Performed by: SURGERY

## 2024-08-02 PROCEDURE — 3078F DIAST BP <80 MM HG: CPT | Mod: HCNC,CPTII,S$GLB, | Performed by: SURGERY

## 2024-08-02 PROCEDURE — 3288F FALL RISK ASSESSMENT DOCD: CPT | Mod: HCNC,CPTII,S$GLB, | Performed by: SURGERY

## 2024-08-02 PROCEDURE — 1126F AMNT PAIN NOTED NONE PRSNT: CPT | Mod: HCNC,CPTII,S$GLB, | Performed by: SURGERY

## 2024-08-02 PROCEDURE — 3044F HG A1C LEVEL LT 7.0%: CPT | Mod: HCNC,CPTII,S$GLB, | Performed by: SURGERY

## 2024-08-02 PROCEDURE — 3075F SYST BP GE 130 - 139MM HG: CPT | Mod: HCNC,CPTII,S$GLB, | Performed by: SURGERY

## 2024-08-02 RX ORDER — AMOXICILLIN AND CLAVULANATE POTASSIUM 875; 125 MG/1; MG/1
TABLET, FILM COATED ORAL
Qty: 2 TABLET | Refills: 0 | Status: SHIPPED | OUTPATIENT
Start: 2024-08-02 | End: 2024-08-02

## 2024-08-02 RX ORDER — AMOXICILLIN AND CLAVULANATE POTASSIUM 875; 125 MG/1; MG/1
TABLET, FILM COATED ORAL
Qty: 2 TABLET | Refills: 0 | Status: SHIPPED | OUTPATIENT
Start: 2024-08-02

## 2024-08-02 NOTE — PROGRESS NOTES
[facility]on [facility] Rectal Surgery Clinic Follow Up    HPI:   Chely Kramer is a 74 y.o. female who presents for follow up of complicated diverticulitis     Interval history:   Feeling well.  Recovered from nephrectomy.  No episodes of recurrent diverticulitis.  Ready to proceed with surgery       Objective:   Vitals:    08/02/24 0849   BP: 132/61   Pulse: 81   Resp: 19        Physical Exam   Vitals reviewed.   Constitutional:       Appearance: Normal appearance.   HENT:      Head: Normocephalic and atraumatic.      Mouth/Throat:      Mouth: Mucous membranes are moist.   Eyes:      Extraocular Movements: Extraocular movements intact.      Pupils: Pupils are equal, round, and reactive to light.   Cardiovascular:      Rate and Rhythm: Normal rate and regular rhythm.   Pulmonary:      Effort: Pulmonary effort is normal.   Abdominal:      Palpations: Abdomen is soft.      Comments:  Broad based right lower quadrant scar, well healed left abdominal incisions     Musculoskeletal:         General: Normal range of motion.   Skin:     General: Skin is warm.      Capillary Refill: Capillary refill takes less than 2 seconds.   Neurological:      General: No focal deficit present.      Mental Status: She is alert and oriented to person, place, and time.   Psychiatric:         Mood and Affect: Mood normal.         Behavior: Behavior normal.         Assessment and Plan:   Chely Kramer  is a 74 y.o. female who presents for follow up of recurrent, complicated diverticulitis     - labs reviewed, creatinine at baseline post nephrectomy   - will avoid nephrotoxic medications in the perioperative period  - we discussed the risks, benefits and alternatives of surgery at length.  We discussed risk of anastomotic leak, bleeding, infection, hernia, need for further surgery, risk of ostomy, and risk of conversion to an open procedure.  We also discussed risk of recurrent diverticulitis.  We discussed expected postoperative recovery.  Consent signed.    - Bowel prep with augmentin given allergy to cipro/flagyl  - surgery 8/20/24       Tracee Thurman MD  Staff Surgeon   Colon & Rectal Surgery

## 2024-08-13 ENCOUNTER — ANESTHESIA EVENT (OUTPATIENT)
Dept: SURGERY | Facility: OTHER | Age: 74
DRG: 331 | End: 2024-08-13
Payer: MEDICARE

## 2024-08-13 ENCOUNTER — HOSPITAL ENCOUNTER (OUTPATIENT)
Dept: PREADMISSION TESTING | Facility: OTHER | Age: 74
Discharge: HOME | End: 2024-08-13
Attending: SURGERY
Payer: MEDICARE

## 2024-08-13 VITALS
SYSTOLIC BLOOD PRESSURE: 158 MMHG | HEART RATE: 81 BPM | WEIGHT: 146 LBS | DIASTOLIC BLOOD PRESSURE: 70 MMHG | OXYGEN SATURATION: 96 % | BODY MASS INDEX: 28.66 KG/M2 | HEIGHT: 60 IN | TEMPERATURE: 97 F | RESPIRATION RATE: 18 BRPM

## 2024-08-13 DIAGNOSIS — Z01.818 PREOP TESTING: Primary | ICD-10-CM

## 2024-08-13 LAB
ABO + RH BLD: ABNORMAL
ANION GAP SERPL CALC-SCNC: 13 MMOL/L (ref 8–16)
BLD GP AB SCN CELLS X3 SERPL QL: ABNORMAL
BLOOD GROUP ANTIBODIES SERPL: NORMAL
BUN SERPL-MCNC: 23 MG/DL (ref 8–23)
CALCIUM SERPL-MCNC: 10 MG/DL (ref 8.7–10.5)
CHLORIDE SERPL-SCNC: 103 MMOL/L (ref 95–110)
CO2 SERPL-SCNC: 23 MMOL/L (ref 23–29)
CREAT SERPL-MCNC: 0.9 MG/DL (ref 0.5–1.4)
EST. GFR  (NO RACE VARIABLE): >60 ML/MIN/1.73 M^2
GLUCOSE SERPL-MCNC: 103 MG/DL (ref 70–110)
POTASSIUM SERPL-SCNC: 4.3 MMOL/L (ref 3.5–5.1)
SODIUM SERPL-SCNC: 139 MMOL/L (ref 136–145)
SPECIMEN OUTDATE: ABNORMAL

## 2024-08-13 PROCEDURE — 80048 BASIC METABOLIC PNL TOTAL CA: CPT | Mod: HCNC | Performed by: ANESTHESIOLOGY

## 2024-08-13 PROCEDURE — 36415 COLL VENOUS BLD VENIPUNCTURE: CPT | Mod: HCNC | Performed by: NURSE PRACTITIONER

## 2024-08-13 PROCEDURE — 86901 BLOOD TYPING SEROLOGIC RH(D): CPT | Mod: HCNC | Performed by: NURSE PRACTITIONER

## 2024-08-13 PROCEDURE — 86870 RBC ANTIBODY IDENTIFICATION: CPT | Mod: HCNC | Performed by: NURSE PRACTITIONER

## 2024-08-13 PROCEDURE — 86900 BLOOD TYPING SEROLOGIC ABO: CPT | Mod: HCNC | Performed by: NURSE PRACTITIONER

## 2024-08-13 PROCEDURE — 86850 RBC ANTIBODY SCREEN: CPT | Mod: HCNC | Performed by: NURSE PRACTITIONER

## 2024-08-13 RX ORDER — SODIUM CHLORIDE, SODIUM LACTATE, POTASSIUM CHLORIDE, CALCIUM CHLORIDE 600; 310; 30; 20 MG/100ML; MG/100ML; MG/100ML; MG/100ML
INJECTION, SOLUTION INTRAVENOUS CONTINUOUS
OUTPATIENT
Start: 2024-08-13

## 2024-08-13 RX ORDER — ACETAMINOPHEN 500 MG
1000 TABLET ORAL
OUTPATIENT
Start: 2024-08-13 | End: 2024-08-13

## 2024-08-13 RX ORDER — LIDOCAINE HYDROCHLORIDE 10 MG/ML
0.5 INJECTION, SOLUTION EPIDURAL; INFILTRATION; INTRACAUDAL; PERINEURAL ONCE
OUTPATIENT
Start: 2024-08-13 | End: 2024-08-13

## 2024-08-13 NOTE — ANESTHESIA PREPROCEDURE EVALUATION
08/13/2024  Chely Kramer is a 74 y.o., female.      Pre-op Assessment    I have reviewed the Patient Summary Reports.     I have reviewed the Nursing Notes. I have reviewed the NPO Status.   I have reviewed the Medications.     Review of Systems  Anesthesia Hx:             Denies Family Hx of Anesthesia complications.    Denies Personal Hx of Anesthesia complications.                    Social:  Former Smoker       Hematology/Oncology:                        --  Cancer in past history (Renal cell CA):                     Cardiovascular:      Valvular problems/Murmurs (h/o murmur, no sequelae)                                       Pulmonary:  Pulmonary Normal                       Renal/:  Chronic Renal Disease   S/p nephrectomy             Hepatic/GI:     GERD  Hepatitis, C Diverticulitis  Hep C + antibody          Musculoskeletal:  Arthritis               Neurological:  Neurology Normal                                      Endocrine:  Endocrine Normal                Physical Exam  General: Well nourished, Cooperative, Alert and Oriented    Airway:  Mallampati: II   Mouth Opening: Normal  TM Distance: Normal  Tongue: Normal  Neck ROM: Normal ROM    Dental:  Intact      Anesthesia Plan  Type of Anesthesia, risks & benefits discussed:    Anesthesia Type: Gen ETT  Intra-op Monitoring Plan: Standard ASA Monitors  Post Op Pain Control Plan: multimodal analgesia and peripheral nerve block  Induction:  IV  Airway Plan: Video, Post-Induction  Informed Consent: Informed consent signed with the Patient and all parties understand the risks and agree with anesthesia plan.  All questions answered.   ASA Score: 2  Anesthesia Plan Notes: CBC, BMP, T&S    Ready For Surgery From Anesthesia Perspective.     .

## 2024-08-13 NOTE — PRE ADMISSION SCREENING
Notified by lab that the cbc sample was clotted and unable to be run.  Dr. Lucio aware.  Will redraw am of surgery.

## 2024-08-13 NOTE — DISCHARGE INSTRUCTIONS
Information to Prepare you for your Surgery    PRE-ADMIT TESTING -  [phone]    [address]  [facility]          Your surgery has been scheduled at [facility]. We are pleased to have the opportunity to serve you. For Further Information please call [phone].    On the day of surgery please report to the Information Desk on the 1st floor.    CONTACT YOUR PHYSICIAN'S OFFICE THE DAY PRIOR TO YOUR SURGERY TO OBTAIN YOUR ARRIVAL TIME.     The evening before surgery do not eat anything after 9 p.m. ( this includes hard candy, chewing gum and mints).  You may only have GATORADE, POWERADE AND WATER  from 9 p.m. until you leave your home.   DO NOT DRINK ANY LIQUIDS ON THE WAY TO THE HOSPITAL.      Why does your anesthesiologist allow you to drink Gatorade/Powerade before surgery?  Gatorade/Powerade helps to increase your comfort before surgery and to decrease your nausea after surgery. The carbohydrates in Gatorade/Powerade help reduce your body's stress response to surgery.  If you are a diabetic-drink only water prior to surgery.    Outpatient Surgery- May allow 2 adult (18 and older) Support Persons (1 being the designated ) for all surgical/procedural patients. A breastfeeding mother will be allowed her infant and 2 adult Support Persons. No one under the age of 18 will be allowed in the building. No swapping out of visitors in the [facility].      SPECIAL MEDICATION INSTRUCTIONS: TAKE medications checked off by the Anesthesiologist on your Medication List.    Angiogram Patients: Take medications as instructed by your physician, including aspirin.     Surgery Patients:    If you take ASPIRIN - Your PHYSICIAN/SURGEON will need to inform you IF/OR when you need to stop taking aspirin prior to your surgery.     The week prior to surgery do not ot take any medications containing IBUPROFEN or NSAIDS ( Advil, Motrin, Goodys, BC, Aleve, Naproxen etc)  If you are not sure if you should take a medicine please call your surgeon's office.  Ok to take Tylenol    Do Not Wear any make-up (especially eye make-up) to surgery. Please remove any false eyelashes or eyelash extensions. If you arrive the day of surgery with makeup/eyelashes on you will be required to remove prior to surgery. (There is a risk of corneal abrasions if eye makeup/eyelash extensions are not removed)      Leave all valuables at home.   Do Not wear any jewelry or watches, including any metal in body piercings. Jewelry must be removed prior to coming to the hospital.  There is a possibility that rings that are unable to be removed may be cut off if they are on the surgical extremity.    Please remove all hair extensions, wigs, clips and any other metal accessories/ ornaments from your hair.  These items may pose a flammable/fire risk in Surgery and must be removed.    Do not shave your surgical area at least 5 days prior to your surgery. The surgical prep will be performed at the hospital according to Infection Control regulations.    Contact Lens must be removed before surgery. Either do not wear the contact lens or bring a case and solution for storage.  Please bring a container for eyeglasses or dentures as required.  Bring any paperwork your physician has provided, such as consent forms,  history and physicals, doctor's orders, etc.   Bring comfortable clothes that are loose fitting to wear upon discharge. Take into consideration the type of surgery being performed.  Maintain your diet as advised per your physician the day prior to surgery.      Adequate rest the night before surgery is advised.   Park in the Parking lot behind the hospital or in the [place] across the street from the parking lot. Parking is complimentary.  If you will be discharged the same day as your procedure, please arrange for a responsible adult to drive you home or to accompany you if traveling by taxi.    YOU WILL NOT BE PERMITTED TO DRIVE OR TO LEAVE THE HOSPITAL ALONE AFTER SURGERY.   If you are being discharged the same day, it is strongly recommended that you arrange for someone to remain with you for the first 24 hrs following your surgery.    The Surgeon will speak to your family/visitor after your surgery regarding the outcome of your surgery and post op care.  The Surgeon may speak to you after your surgery, but there is a possibility you may not remember the details.  Please check with your family members regarding the conversation with the Surgeon.    We strongly recommend whoever is bringing you home be present for discharge instructions.  This will ensure a thorough understanding for your post op home care.    If the patient has fever, cough, or signs/symptoms of Flu or Covid please do not come in for your surgery. Contact your surgeon and your primary care physician for further instructions.           Thank you for your cooperation.  The Staff of [facility].            Bathing Instructions with Hibiclens    Shower the evening before and morning of your procedure with Chlorhexidine (Hibiclens)  do not use Chlorhexidine on your face or genitals. Do not get in your eyes.  Wash your face with water and your regular face wash/soap  Use your regular shampoo  Apply Chlorhexidine (Hibiclens) directly on your skin or on a wet washcloth and wash gently. When showering: Move away from the shower stream when applying Chlorhexidine (Hibiclens) to avoid rinsing off too soon.  Rinse thoroughly with warm water  Do not dilute Chlorhexidine (Hibiclens)   Dry off as usual, do not use any deodorant, powder, body lotions, perfume, after shave or cologne.

## 2024-08-19 ENCOUNTER — TELEPHONE (OUTPATIENT)
Dept: SURGERY | Facility: CLINIC | Age: 74
End: 2024-08-19
Payer: MEDICARE

## 2024-08-19 NOTE — TELEPHONE ENCOUNTER
Spoke with pt regarding 0530 arrival time and location for surgery on 8/20. Pt verbally confirmed time and location. Advised to wear comfortable clothes when leaving procedure, but informed that she may also wear to procedure. Pt denies questions related to bowel prep or location of surgery.

## 2024-08-20 ENCOUNTER — ANESTHESIA (OUTPATIENT)
Dept: SURGERY | Facility: OTHER | Age: 74
DRG: 331 | End: 2024-08-20
Payer: MEDICARE

## 2024-08-20 ENCOUNTER — HOSPITAL ENCOUNTER (INPATIENT)
Facility: OTHER | Age: 74
LOS: 2 days | Discharge: HOME | DRG: 331 | End: 2024-08-22
Attending: SURGERY | Admitting: SURGERY
Payer: MEDICARE

## 2024-08-20 DIAGNOSIS — K57.92 DIVERTICULITIS: ICD-10-CM

## 2024-08-20 DIAGNOSIS — R94.31 T WAVE INVERSION IN EKG: ICD-10-CM

## 2024-08-20 DIAGNOSIS — Z01.818 PREOP TESTING: ICD-10-CM

## 2024-08-20 LAB
BASOPHILS # BLD AUTO: 0.03 K/UL (ref 0–0.2)
BASOPHILS NFR BLD: 0.5 % (ref 0–1.9)
DIFFERENTIAL METHOD BLD: ABNORMAL
EOSINOPHIL # BLD AUTO: 0.1 K/UL (ref 0–0.5)
EOSINOPHIL NFR BLD: 1.3 % (ref 0–8)
ERYTHROCYTE [DISTWIDTH] IN BLOOD BY AUTOMATED COUNT: 12.3 % (ref 11.5–14.5)
HCT VFR BLD AUTO: 37 % (ref 37–48.5)
HGB BLD-MCNC: 12.3 G/DL (ref 12–16)
IMM GRANULOCYTES # BLD AUTO: 0.02 K/UL (ref 0–0.04)
IMM GRANULOCYTES NFR BLD AUTO: 0.3 % (ref 0–0.5)
LYMPHOCYTES # BLD AUTO: 1.8 K/UL (ref 1–4.8)
LYMPHOCYTES NFR BLD: 29 % (ref 18–48)
MCH RBC QN AUTO: 32 PG (ref 27–31)
MCHC RBC AUTO-ENTMCNC: 33.2 G/DL (ref 32–36)
MCV RBC AUTO: 96 FL (ref 82–98)
MONOCYTES # BLD AUTO: 0.6 K/UL (ref 0.3–1)
MONOCYTES NFR BLD: 9.6 % (ref 4–15)
NEUTROPHILS # BLD AUTO: 3.8 K/UL (ref 1.8–7.7)
NEUTROPHILS NFR BLD: 59.3 % (ref 38–73)
NRBC BLD-RTO: 0 /100 WBC
PLATELET # BLD AUTO: 271 K/UL (ref 150–450)
PMV BLD AUTO: 7.9 FL (ref 9.2–12.9)
RBC # BLD AUTO: 3.84 M/UL (ref 4–5.4)
WBC # BLD AUTO: 6.35 K/UL (ref 3.9–12.7)

## 2024-08-20 PROCEDURE — 63600175 PHARM REV CODE 636 W HCPCS: Mod: JZ,JG,HCNC | Performed by: ANESTHESIOLOGY

## 2024-08-20 PROCEDURE — 63600175 PHARM REV CODE 636 W HCPCS: Mod: HCNC | Performed by: NURSE PRACTITIONER

## 2024-08-20 PROCEDURE — 71000039 HC RECOVERY, EACH ADD'L HOUR: Mod: HCNC | Performed by: SURGERY

## 2024-08-20 PROCEDURE — 63600175 PHARM REV CODE 636 W HCPCS: Mod: HCNC | Performed by: NURSE ANESTHETIST, CERTIFIED REGISTERED

## 2024-08-20 PROCEDURE — 36000712 HC OR TIME LEV V 1ST 15 MIN: Mod: HCNC | Performed by: SURGERY

## 2024-08-20 PROCEDURE — 25000003 PHARM REV CODE 250: Mod: HCNC | Performed by: NURSE ANESTHETIST, CERTIFIED REGISTERED

## 2024-08-20 PROCEDURE — 63600175 PHARM REV CODE 636 W HCPCS: Mod: JZ,JG,HCNC | Performed by: STUDENT IN AN ORGANIZED HEALTH CARE EDUCATION/TRAINING PROGRAM

## 2024-08-20 PROCEDURE — 88307 TISSUE EXAM BY PATHOLOGIST: CPT | Mod: 26,HCNC,, | Performed by: STUDENT IN AN ORGANIZED HEALTH CARE EDUCATION/TRAINING PROGRAM

## 2024-08-20 PROCEDURE — 0DNW4ZZ RELEASE PERITONEUM, PERCUTANEOUS ENDOSCOPIC APPROACH: ICD-10-PCS | Performed by: SURGERY

## 2024-08-20 PROCEDURE — 25000003 PHARM REV CODE 250: Mod: HCNC

## 2024-08-20 PROCEDURE — 37000009 HC ANESTHESIA EA ADD 15 MINS: Mod: HCNC | Performed by: SURGERY

## 2024-08-20 PROCEDURE — 71000033 HC RECOVERY, INTIAL HOUR: Mod: HCNC | Performed by: SURGERY

## 2024-08-20 PROCEDURE — P9045 ALBUMIN (HUMAN), 5%, 250 ML: HCPCS | Mod: JZ,JG,HCNC | Performed by: NURSE ANESTHETIST, CERTIFIED REGISTERED

## 2024-08-20 PROCEDURE — 63600175 PHARM REV CODE 636 W HCPCS: Mod: HCNC | Performed by: ANESTHESIOLOGY

## 2024-08-20 PROCEDURE — 44204 LAPARO PARTIAL COLECTOMY: CPT | Mod: HCNC,,, | Performed by: SURGERY

## 2024-08-20 PROCEDURE — 0DNN4ZZ RELEASE SIGMOID COLON, PERCUTANEOUS ENDOSCOPIC APPROACH: ICD-10-PCS | Performed by: SURGERY

## 2024-08-20 PROCEDURE — 25000003 PHARM REV CODE 250: Mod: HCNC | Performed by: NURSE PRACTITIONER

## 2024-08-20 PROCEDURE — 0DJD8ZZ INSPECTION OF LOWER INTESTINAL TRACT, VIA NATURAL OR ARTIFICIAL OPENING ENDOSCOPIC: ICD-10-PCS | Performed by: SURGERY

## 2024-08-20 PROCEDURE — 25000003 PHARM REV CODE 250: Mod: HCNC | Performed by: ANESTHESIOLOGY

## 2024-08-20 PROCEDURE — 0DTN4ZZ RESECTION OF SIGMOID COLON, PERCUTANEOUS ENDOSCOPIC APPROACH: ICD-10-PCS | Performed by: SURGERY

## 2024-08-20 PROCEDURE — 88307 TISSUE EXAM BY PATHOLOGIST: CPT | Mod: HCNC | Performed by: STUDENT IN AN ORGANIZED HEALTH CARE EDUCATION/TRAINING PROGRAM

## 2024-08-20 PROCEDURE — 27201423 OPTIME MED/SURG SUP & DEVICES STERILE SUPPLY: Mod: HCNC | Performed by: SURGERY

## 2024-08-20 PROCEDURE — 36000713 HC OR TIME LEV V EA ADD 15 MIN: Mod: HCNC | Performed by: SURGERY

## 2024-08-20 PROCEDURE — 37000008 HC ANESTHESIA 1ST 15 MINUTES: Mod: HCNC | Performed by: SURGERY

## 2024-08-20 PROCEDURE — C1729 CATH, DRAINAGE: HCPCS | Mod: HCNC | Performed by: SURGERY

## 2024-08-20 PROCEDURE — 94799 UNLISTED PULMONARY SVC/PX: CPT | Mod: HCNC

## 2024-08-20 PROCEDURE — 63600175 PHARM REV CODE 636 W HCPCS: Mod: HCNC

## 2024-08-20 PROCEDURE — 85025 COMPLETE CBC W/AUTO DIFF WBC: CPT | Mod: HCNC | Performed by: ANESTHESIOLOGY

## 2024-08-20 PROCEDURE — 8E0W4CZ ROBOTIC ASSISTED PROCEDURE OF TRUNK REGION, PERCUTANEOUS ENDOSCOPIC APPROACH: ICD-10-PCS | Performed by: SURGERY

## 2024-08-20 PROCEDURE — 64488 TAP BLOCK BI INJECTION: CPT | Mod: HCNC | Performed by: ANESTHESIOLOGY

## 2024-08-20 PROCEDURE — C9290 INJ, BUPIVACAINE LIPOSOME: HCPCS | Mod: HCNC | Performed by: ANESTHESIOLOGY

## 2024-08-20 PROCEDURE — 25000003 PHARM REV CODE 250: Mod: HCNC | Performed by: STUDENT IN AN ORGANIZED HEALTH CARE EDUCATION/TRAINING PROGRAM

## 2024-08-20 PROCEDURE — 36415 COLL VENOUS BLD VENIPUNCTURE: CPT | Mod: HCNC | Performed by: ANESTHESIOLOGY

## 2024-08-20 PROCEDURE — 21400001 HC TELEMETRY ROOM: Mod: HCNC

## 2024-08-20 PROCEDURE — 63600175 PHARM REV CODE 636 W HCPCS: Mod: HCNC | Performed by: SURGERY

## 2024-08-20 RX ORDER — DEXMEDETOMIDINE HYDROCHLORIDE 100 UG/ML
INJECTION, SOLUTION INTRAVENOUS
Status: DISCONTINUED | OUTPATIENT
Start: 2024-08-20 | End: 2024-08-20

## 2024-08-20 RX ORDER — SODIUM CHLORIDE, SODIUM LACTATE, POTASSIUM CHLORIDE, CALCIUM CHLORIDE 600; 310; 30; 20 MG/100ML; MG/100ML; MG/100ML; MG/100ML
INJECTION, SOLUTION INTRAVENOUS CONTINUOUS
Status: DISCONTINUED | OUTPATIENT
Start: 2024-08-20 | End: 2024-08-20

## 2024-08-20 RX ORDER — DEXAMETHASONE SODIUM PHOSPHATE 4 MG/ML
INJECTION, SOLUTION INTRA-ARTICULAR; INTRALESIONAL; INTRAMUSCULAR; INTRAVENOUS; SOFT TISSUE
Status: DISCONTINUED | OUTPATIENT
Start: 2024-08-20 | End: 2024-08-20

## 2024-08-20 RX ORDER — LATANOPROST 50 UG/ML
1 SOLUTION/ DROPS OPHTHALMIC NIGHTLY
Status: DISCONTINUED | OUTPATIENT
Start: 2024-08-20 | End: 2024-08-22 | Stop reason: HOSPADM

## 2024-08-20 RX ORDER — SODIUM CHLORIDE 0.9 % (FLUSH) 0.9 %
3 SYRINGE (ML) INJECTION
Status: DISCONTINUED | OUTPATIENT
Start: 2024-08-20 | End: 2024-08-20 | Stop reason: HOSPADM

## 2024-08-20 RX ORDER — LABETALOL HYDROCHLORIDE 5 MG/ML
INJECTION, SOLUTION INTRAVENOUS
Status: DISCONTINUED | OUTPATIENT
Start: 2024-08-20 | End: 2024-08-20

## 2024-08-20 RX ORDER — OXYCODONE HYDROCHLORIDE 5 MG/1
5 TABLET ORAL EVERY 4 HOURS PRN
Status: DISCONTINUED | OUTPATIENT
Start: 2024-08-20 | End: 2024-08-22 | Stop reason: HOSPADM

## 2024-08-20 RX ORDER — DIPHENHYDRAMINE HYDROCHLORIDE 50 MG/ML
12.5 INJECTION INTRAMUSCULAR; INTRAVENOUS EVERY 30 MIN PRN
Status: DISCONTINUED | OUTPATIENT
Start: 2024-08-20 | End: 2024-08-20 | Stop reason: HOSPADM

## 2024-08-20 RX ORDER — LIDOCAINE HYDROCHLORIDE 10 MG/ML
1 INJECTION, SOLUTION EPIDURAL; INFILTRATION; INTRACAUDAL; PERINEURAL ONCE
Status: DISCONTINUED | OUTPATIENT
Start: 2024-08-20 | End: 2024-08-20 | Stop reason: HOSPADM

## 2024-08-20 RX ORDER — ALBUMIN HUMAN 50 G/1000ML
SOLUTION INTRAVENOUS
Status: DISCONTINUED | OUTPATIENT
Start: 2024-08-20 | End: 2024-08-20

## 2024-08-20 RX ORDER — EPHEDRINE SULFATE 50 MG/ML
INJECTION, SOLUTION INTRAVENOUS
Status: DISCONTINUED | OUTPATIENT
Start: 2024-08-20 | End: 2024-08-20

## 2024-08-20 RX ORDER — GLYCOPYRROLATE 0.2 MG/ML
0.2 INJECTION INTRAMUSCULAR; INTRAVENOUS ONCE
Status: COMPLETED | OUTPATIENT
Start: 2024-08-20 | End: 2024-08-20

## 2024-08-20 RX ORDER — ACETAMINOPHEN 10 MG/ML
1000 INJECTION, SOLUTION INTRAVENOUS EVERY 8 HOURS
Status: COMPLETED | OUTPATIENT
Start: 2024-08-20 | End: 2024-08-21

## 2024-08-20 RX ORDER — HYDROMORPHONE HYDROCHLORIDE 1 MG/ML
0.5 INJECTION, SOLUTION INTRAMUSCULAR; INTRAVENOUS; SUBCUTANEOUS EVERY 6 HOURS PRN
Status: DISCONTINUED | OUTPATIENT
Start: 2024-08-20 | End: 2024-08-22 | Stop reason: HOSPADM

## 2024-08-20 RX ORDER — OXYCODONE HYDROCHLORIDE 5 MG/1
5 TABLET ORAL EVERY 4 HOURS PRN
Status: DISCONTINUED | OUTPATIENT
Start: 2024-08-20 | End: 2024-08-20 | Stop reason: HOSPADM

## 2024-08-20 RX ORDER — PROPOFOL 10 MG/ML
VIAL (ML) INTRAVENOUS
Status: DISCONTINUED | OUTPATIENT
Start: 2024-08-20 | End: 2024-08-20

## 2024-08-20 RX ORDER — SODIUM CHLORIDE, SODIUM LACTATE, POTASSIUM CHLORIDE, CALCIUM CHLORIDE 600; 310; 30; 20 MG/100ML; MG/100ML; MG/100ML; MG/100ML
INJECTION, SOLUTION INTRAVENOUS CONTINUOUS
Status: DISCONTINUED | OUTPATIENT
Start: 2024-08-20 | End: 2024-08-21

## 2024-08-20 RX ORDER — SODIUM CHLORIDE 9 MG/ML
INJECTION, SOLUTION INTRAVENOUS CONTINUOUS
Status: DISCONTINUED | OUTPATIENT
Start: 2024-08-20 | End: 2024-08-20

## 2024-08-20 RX ORDER — MEPERIDINE HYDROCHLORIDE 25 MG/ML
12.5 INJECTION INTRAMUSCULAR; INTRAVENOUS; SUBCUTANEOUS ONCE AS NEEDED
Status: DISCONTINUED | OUTPATIENT
Start: 2024-08-20 | End: 2024-08-20 | Stop reason: HOSPADM

## 2024-08-20 RX ORDER — INDOCYANINE GREEN AND WATER 25 MG
KIT INJECTION
Status: DISCONTINUED | OUTPATIENT
Start: 2024-08-20 | End: 2024-08-20

## 2024-08-20 RX ORDER — LIDOCAINE HYDROCHLORIDE 10 MG/ML
0.5 INJECTION, SOLUTION EPIDURAL; INFILTRATION; INTRACAUDAL; PERINEURAL ONCE
Status: DISCONTINUED | OUTPATIENT
Start: 2024-08-20 | End: 2024-08-20 | Stop reason: HOSPADM

## 2024-08-20 RX ORDER — PROCHLORPERAZINE EDISYLATE 5 MG/ML
5 INJECTION INTRAMUSCULAR; INTRAVENOUS EVERY 30 MIN PRN
Status: COMPLETED | OUTPATIENT
Start: 2024-08-20 | End: 2024-08-20

## 2024-08-20 RX ORDER — GLUCAGON 1 MG
1 KIT INJECTION
Status: DISCONTINUED | OUTPATIENT
Start: 2024-08-20 | End: 2024-08-20 | Stop reason: HOSPADM

## 2024-08-20 RX ORDER — TRAMADOL HYDROCHLORIDE 50 MG/1
50 TABLET ORAL EVERY 6 HOURS PRN
Status: DISCONTINUED | OUTPATIENT
Start: 2024-08-20 | End: 2024-08-22 | Stop reason: HOSPADM

## 2024-08-20 RX ORDER — FENTANYL CITRATE 50 UG/ML
INJECTION, SOLUTION INTRAMUSCULAR; INTRAVENOUS
Status: DISCONTINUED | OUTPATIENT
Start: 2024-08-20 | End: 2024-08-20

## 2024-08-20 RX ORDER — HEPARIN SODIUM 5000 [USP'U]/ML
5000 INJECTION, SOLUTION INTRAVENOUS; SUBCUTANEOUS ONCE
Status: COMPLETED | OUTPATIENT
Start: 2024-08-20 | End: 2024-08-20

## 2024-08-20 RX ORDER — ASCORBIC ACID 500 MG
2500 TABLET ORAL DAILY
Status: DISCONTINUED | OUTPATIENT
Start: 2024-08-20 | End: 2024-08-22 | Stop reason: HOSPADM

## 2024-08-20 RX ORDER — LIDOCAINE HYDROCHLORIDE 20 MG/ML
INJECTION INTRAVENOUS
Status: DISCONTINUED | OUTPATIENT
Start: 2024-08-20 | End: 2024-08-20

## 2024-08-20 RX ORDER — ONDANSETRON HYDROCHLORIDE 2 MG/ML
4 INJECTION, SOLUTION INTRAVENOUS EVERY 6 HOURS PRN
Status: DISCONTINUED | OUTPATIENT
Start: 2024-08-20 | End: 2024-08-22 | Stop reason: HOSPADM

## 2024-08-20 RX ORDER — ACETAMINOPHEN 500 MG
1000 TABLET ORAL EVERY 8 HOURS
Status: DISCONTINUED | OUTPATIENT
Start: 2024-08-21 | End: 2024-08-22 | Stop reason: HOSPADM

## 2024-08-20 RX ORDER — HYDROMORPHONE HYDROCHLORIDE 2 MG/ML
0.4 INJECTION, SOLUTION INTRAMUSCULAR; INTRAVENOUS; SUBCUTANEOUS EVERY 5 MIN PRN
Status: DISCONTINUED | OUTPATIENT
Start: 2024-08-20 | End: 2024-08-20 | Stop reason: HOSPADM

## 2024-08-20 RX ORDER — ENOXAPARIN SODIUM 100 MG/ML
40 INJECTION SUBCUTANEOUS EVERY 24 HOURS
Status: DISCONTINUED | OUTPATIENT
Start: 2024-08-20 | End: 2024-08-22 | Stop reason: HOSPADM

## 2024-08-20 RX ORDER — ACETAMINOPHEN 500 MG
1000 TABLET ORAL
Status: COMPLETED | OUTPATIENT
Start: 2024-08-20 | End: 2024-08-20

## 2024-08-20 RX ORDER — BUPIVACAINE HYDROCHLORIDE 2.5 MG/ML
INJECTION, SOLUTION EPIDURAL; INFILTRATION; INTRACAUDAL
Status: COMPLETED | OUTPATIENT
Start: 2024-08-20 | End: 2024-08-20

## 2024-08-20 RX ORDER — KETAMINE HCL IN 0.9 % NACL 50 MG/5 ML
SYRINGE (ML) INTRAVENOUS
Status: DISCONTINUED | OUTPATIENT
Start: 2024-08-20 | End: 2024-08-20

## 2024-08-20 RX ORDER — ROCURONIUM BROMIDE 10 MG/ML
INJECTION, SOLUTION INTRAVENOUS
Status: DISCONTINUED | OUTPATIENT
Start: 2024-08-20 | End: 2024-08-20

## 2024-08-20 RX ORDER — MUPIROCIN 20 MG/G
OINTMENT TOPICAL
Status: DISCONTINUED | OUTPATIENT
Start: 2024-08-20 | End: 2024-08-20 | Stop reason: HOSPADM

## 2024-08-20 RX ORDER — OXYCODONE HYDROCHLORIDE 10 MG/1
10 TABLET ORAL EVERY 4 HOURS PRN
Status: DISCONTINUED | OUTPATIENT
Start: 2024-08-20 | End: 2024-08-22 | Stop reason: HOSPADM

## 2024-08-20 RX ORDER — BRIMONIDINE TARTRATE 1.5 MG/ML
1 SOLUTION/ DROPS OPHTHALMIC 2 TIMES DAILY
Status: DISCONTINUED | OUTPATIENT
Start: 2024-08-20 | End: 2024-08-22 | Stop reason: HOSPADM

## 2024-08-20 RX ORDER — ONDANSETRON HYDROCHLORIDE 2 MG/ML
INJECTION, SOLUTION INTRAMUSCULAR; INTRAVENOUS
Status: DISCONTINUED | OUTPATIENT
Start: 2024-08-20 | End: 2024-08-20

## 2024-08-20 RX ADMIN — PROPOFOL 150 MG: 10 INJECTION, EMULSION INTRAVENOUS at 07:08

## 2024-08-20 RX ADMIN — CEFOXITIN 2 G: 2 INJECTION, POWDER, FOR SOLUTION INTRAVENOUS at 09:08

## 2024-08-20 RX ADMIN — SODIUM CHLORIDE, SODIUM LACTATE, POTASSIUM CHLORIDE, AND CALCIUM CHLORIDE: 600; 310; 30; 20 INJECTION, SOLUTION INTRAVENOUS at 10:08

## 2024-08-20 RX ADMIN — FENTANYL CITRATE 25 MCG: 50 INJECTION, SOLUTION INTRAMUSCULAR; INTRAVENOUS at 07:08

## 2024-08-20 RX ADMIN — DEXMEDETOMIDINE HYDROCHLORIDE 2 MCG: 100 INJECTION, SOLUTION, CONCENTRATE INTRAVENOUS at 10:08

## 2024-08-20 RX ADMIN — FENTANYL CITRATE 100 MCG: 50 INJECTION, SOLUTION INTRAMUSCULAR; INTRAVENOUS at 07:08

## 2024-08-20 RX ADMIN — OXYCODONE HYDROCHLORIDE 5 MG: 5 TABLET ORAL at 11:08

## 2024-08-20 RX ADMIN — ACETAMINOPHEN 1000 MG: 500 TABLET ORAL at 05:08

## 2024-08-20 RX ADMIN — PROCHLORPERAZINE EDISYLATE 2.5 MG: 5 INJECTION INTRAMUSCULAR; INTRAVENOUS at 11:08

## 2024-08-20 RX ADMIN — ROCURONIUM BROMIDE 40 MG: 10 SOLUTION INTRAVENOUS at 07:08

## 2024-08-20 RX ADMIN — CARBOXYMETHYLCELLULOSE SODIUM 2 DROP: 2.5 SOLUTION/ DROPS OPHTHALMIC at 07:08

## 2024-08-20 RX ADMIN — ENOXAPARIN SODIUM 40 MG: 40 INJECTION SUBCUTANEOUS at 03:08

## 2024-08-20 RX ADMIN — OXYCODONE HYDROCHLORIDE AND ACETAMINOPHEN 2500 MG: 500 TABLET ORAL at 03:08

## 2024-08-20 RX ADMIN — LATANOPROST 1 DROP: 50 SOLUTION OPHTHALMIC at 09:08

## 2024-08-20 RX ADMIN — HYDROMORPHONE HYDROCHLORIDE 0.4 MG: 2 INJECTION INTRAMUSCULAR; INTRAVENOUS; SUBCUTANEOUS at 11:08

## 2024-08-20 RX ADMIN — ACETAMINOPHEN 1000 MG: 10 INJECTION INTRAVENOUS at 09:08

## 2024-08-20 RX ADMIN — BUPIVACAINE HYDROCHLORIDE 40 ML: 2.5 INJECTION, SOLUTION EPIDURAL; INFILTRATION; INTRACAUDAL; PERINEURAL at 06:08

## 2024-08-20 RX ADMIN — SODIUM CHLORIDE, SODIUM LACTATE, POTASSIUM CHLORIDE, AND CALCIUM CHLORIDE: 600; 310; 30; 20 INJECTION, SOLUTION INTRAVENOUS at 07:08

## 2024-08-20 RX ADMIN — DEXAMETHASONE SODIUM PHOSPHATE 8 MG: 4 INJECTION, SOLUTION INTRAMUSCULAR; INTRAVENOUS at 07:08

## 2024-08-20 RX ADMIN — PROPOFOL 50 MG: 10 INJECTION, EMULSION INTRAVENOUS at 07:08

## 2024-08-20 RX ADMIN — ROCURONIUM BROMIDE 20 MG: 10 SOLUTION INTRAVENOUS at 10:08

## 2024-08-20 RX ADMIN — PROPOFOL 20 MG: 10 INJECTION, EMULSION INTRAVENOUS at 08:08

## 2024-08-20 RX ADMIN — BUPIVACAINE 20 ML: 13.3 INJECTION, SUSPENSION, LIPOSOMAL INFILTRATION at 06:08

## 2024-08-20 RX ADMIN — Medication 25 MG: at 08:08

## 2024-08-20 RX ADMIN — FENTANYL CITRATE 25 MCG: 50 INJECTION, SOLUTION INTRAMUSCULAR; INTRAVENOUS at 08:08

## 2024-08-20 RX ADMIN — ROCURONIUM BROMIDE 20 MG: 10 SOLUTION INTRAVENOUS at 08:08

## 2024-08-20 RX ADMIN — GLYCOPYRROLATE 0.2 MG: 0.2 INJECTION INTRAMUSCULAR; INTRAVENOUS at 12:08

## 2024-08-20 RX ADMIN — EPHEDRINE SULFATE 10 MG: 50 INJECTION INTRAVENOUS at 07:08

## 2024-08-20 RX ADMIN — OXYCODONE HYDROCHLORIDE 10 MG: 10 TABLET ORAL at 09:08

## 2024-08-20 RX ADMIN — MUPIROCIN: 20 OINTMENT TOPICAL at 05:08

## 2024-08-20 RX ADMIN — ROCURONIUM BROMIDE 10 MG: 10 SOLUTION INTRAVENOUS at 08:08

## 2024-08-20 RX ADMIN — SUGAMMADEX 200 MG: 100 INJECTION, SOLUTION INTRAVENOUS at 10:08

## 2024-08-20 RX ADMIN — LABETALOL HYDROCHLORIDE 5 MG: 5 INJECTION INTRAVENOUS at 07:08

## 2024-08-20 RX ADMIN — ALBUMIN (HUMAN) 500 ML: 12.5 SOLUTION INTRAVENOUS at 07:08

## 2024-08-20 RX ADMIN — PROPOFOL 20 MG: 10 INJECTION, EMULSION INTRAVENOUS at 07:08

## 2024-08-20 RX ADMIN — HEPARIN SODIUM 5000 UNITS: 5000 INJECTION INTRAVENOUS; SUBCUTANEOUS at 05:08

## 2024-08-20 RX ADMIN — BRIMONIDINE TARTRATE 1 DROP: 1.5 SOLUTION/ DROPS OPHTHALMIC at 09:08

## 2024-08-20 RX ADMIN — CEFOXITIN 2 G: 2 INJECTION, POWDER, FOR SOLUTION INTRAVENOUS at 07:08

## 2024-08-20 RX ADMIN — FENTANYL CITRATE 100 MCG: 50 INJECTION, SOLUTION INTRAMUSCULAR; INTRAVENOUS at 06:08

## 2024-08-20 RX ADMIN — ROCURONIUM BROMIDE 20 MG: 10 SOLUTION INTRAVENOUS at 09:08

## 2024-08-20 RX ADMIN — ONDANSETRON 4 MG: 2 INJECTION INTRAMUSCULAR; INTRAVENOUS at 10:08

## 2024-08-20 RX ADMIN — SODIUM CHLORIDE, POTASSIUM CHLORIDE, SODIUM LACTATE AND CALCIUM CHLORIDE: 600; 310; 30; 20 INJECTION, SOLUTION INTRAVENOUS at 03:08

## 2024-08-20 RX ADMIN — INDOCYANINE GREEN 10 MG: KIT INTRAVENOUS at 09:08

## 2024-08-20 RX ADMIN — LABETALOL HYDROCHLORIDE 5 MG: 5 INJECTION INTRAVENOUS at 08:08

## 2024-08-20 RX ADMIN — LIDOCAINE HYDROCHLORIDE 50 MG: 20 INJECTION, SOLUTION INTRAVENOUS at 07:08

## 2024-08-20 NOTE — ANESTHESIA PROCEDURE NOTES
Intubation    Date/Time: 8/20/2024 7:25 AM    Performed by: Kim Hearn CRNA  Authorized by: David Trinh MD    Intubation:     Induction:  Intravenous    Intubated:  Postinduction    Mask Ventilation:  Easy with oral airway    Attempts:  1    Attempted By:  Student    Method of Intubation:  Video laryngoscopy    Blade:  De Leon 3    Laryngeal View Grade: Grade I - full view of cords      Difficult Airway Encountered?: No      Complications:  None    Airway Device:  Oral endotracheal tube    Airway Device Size:  7.0    Style/Cuff Inflation:  Cuffed (inflated to minimal occlusive pressure)    Inflation Amount (mL):  7    Tube secured:  21    Secured at:  The lips    Placement Verified By:  Capnometry and Revisualization with laryngoscopy    Complicating Factors:  None    Findings Post-Intubation:  BS equal bilateral and atraumatic/condition of teeth unchanged

## 2024-08-20 NOTE — ANESTHESIA POSTPROCEDURE EVALUATION
Anesthesia Post Evaluation    Patient: Chely Kramer    Procedure(s) Performed: Procedure(s) (LRB):  XI ROBOTIC SIGMOID COLECTOMY, flexible sigmoidoscopy (N/A)  ROBOTIC LYSIS, ADHESIONS (N/A)    Final Anesthesia Type: general      Patient location during evaluation: PACU  Patient participation: Yes- Able to Participate  Level of consciousness: awake and alert  Post-procedure vital signs: reviewed and stable  Pain management: adequate  Airway patency: patent    PONV status at discharge: No PONV  Anesthetic complications: no      Cardiovascular status: blood pressure returned to baseline  Respiratory status: spontaneous ventilation  Hydration status: euvolemic  Follow-up not needed.          Vitals Value Taken Time   /63 08/20/24 1501   Temp 36.3 °C (97.3 °F) 08/20/24 1427   Pulse 74 08/20/24 1503   Resp 16 08/20/24 1457   SpO2 100 % 08/20/24 1503   Vitals shown include unfiled device data.      Event Time   Out of Recovery 15:18:00         Pain/Emily Score: Pain Rating Prior to Med Admin: 8 (8/20/2024 11:58 AM)  Pain Rating Post Med Admin: 3 (8/20/2024 12:57 PM)  Emily Score: 8 (8/20/2024  2:57 PM)

## 2024-08-20 NOTE — OP NOTE
Date of surgery: 08/20/2024    Operative Report  Pre-operative diagnosis: complicated diverticulitis  Post-operative diagnosis: Same as above    Procedure:  Robotic-assisted sigmoid colectomy  Flexible sigmoidoscopy  Lysis of adhesions    Findings:  Dense intra-abdominal adhesions related to prior ventral hernia repair with mesh  Thickened sigmoid colon  Negative leak test  Intact anastomotic rings  Well perfused colon conduit and rectum with IcG     This procedure was not performed to treat colon cancer through resection       Surgeon: Tracee Thurman MD   Assistant:  MD Argelia iL NP first assist, I certify that a first assist was necessary for the completion of the case as no qualified resident or fellow was available to assist.     Indication: Ms. Kramer is a 74 year old woman with a history of perforated diverticulitis  who is scheduled to undergo robotic sigmoid colectomy. The benefits, risks, and alternatives were discussed with the patient, they were given the opportunity to ask questions and they elected to proceed with operative intervention after signing written consent.    Procedure:  After pre-operative assessment and review of informed consent, the patient was taken to the operating room and received general anesthesia. A guo catheter was then placed under strict sterile precautions. Pre-operative antibiotics were administered if indicated and the patient was placed in lithotomy position. The abdomen was prepped and draped in the usual sterile fashion and a timeout was performed according to [facility] Quality and Safety guidelines.      An incision was made at Conde's point.  A veress needle was inserted and a negative drop test was performed.  Insufflation was turned on and pneumoperitoneum achieved.  An 8 mm trocar was placed in an optiview technique.  Abdominal contents were inspected and there were dense adhesions in the right lower quadrant at the site of her prior hernia  repair with mesh.  Three additional trocars were placed under direct laparoscopic visualization from left goncalves's point to right ASIS.  A 5 mm airseal was placed in the right upper quadrant.  Laparoscopic lysis of adhesions was performed with scissors to facilitate trocar placement.  The patient was then positioned in steep trendelenberg with left sided tilt.       The Idiboni robot was then brought onto the field and docked.  A small grasping retractor, fenestrated bipolar and scissors were introduced to the field.  The sigmoid colon was densely adherent to the left sidewall.  These adhesions were mobilized to allow for adequate elevation of the colon.  The sigmoid colon was then elevated.  The peritoneum was scored and the retrorectal plane was entered.  We continued our dissection laterally until we identified gonadal vessels and left ureter.  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  These were swept down and out of the field.  We then continued our dissection superiorly towards the ARAVIND pedicle.  This was isolated, ligated and divided with the vessel sealer.  Hemostasis was appropriate.  We then continued the dissection superiorly.  This plane was difficult to identify due to prior surgery.  We then turned our attention to the lateral attachments.  These were sharply divided along the white line of toldt up to splenic flexure.       I then developed the mesorectal plane.  First starting posteriorly and then laterally and anteriorly until we had a healthy area of proximal rectum where the tinea splayed.  The mesorectum was then divided with the vessel sealer and the rectosigmoid junction skeletonized.  The rectum was then divided with a sureform 45 stapler.  We then identified the divided ARAVIND pedicle and ligated and divided the mesentery up to the descending colon.  At this point, I asked anesthesia to give 5 cc of IcG.  We confirmed perfusion of the descending  colon conduit and rectal stump.  A full thickness colotomy was made with scissors.  A second partial thickness colotomy was made proximally.  The anvil of the 29 EEA stapler was then guided into the colon conduit.  The distal colotomy was then resected with a sureform 45 stapler.  The specimen was then placed out of the way.       Dr. Basilio then went below and placed EEA sizers in the rectum.  These passed easily.  She then guided the stapler into the rectum and opened the spike.  The two ends of the stapler were mated.  We confirmed appropriate orientation of the colon conduit.  The stapler was then closed and fired.  The stapler was removed.  Anastomotic rings were inspected and were intact.  The colon was then occluded and the pelvis filled with saline.  Flexible sigmoidoscopy was performed.  The anastomosis was patent without bleeding.  The leak test was negative.  The rectum was desufflated and irrigation suctioned.  Hemostasis was appropriate.  The robotic instruments were removed and the robot undocked and removed from the field.       The colon specimen was then grasped with a locking grasper.  The 12 mm trocar was removed and the incision enlarged. A small em retractor was placed in the wound and the specimen removed from the abdomen.  This was sent for pathology.  The fascia at this site was closed with a running PDS suture.  The wounds were irrigated and incisions closed with monocryl.  The wounds were cleaned and dried and dressed with dermabond.      Prior to closure and after final closure instrument, needle, and sponge counts were correct.    The procedure was completed without complication and was well-tolerated. The patient was then brought to the post-anesthesia care unit in stable condition. I was present for the entire operation.    Complications: None  Estimated blood loss: 100 mL  Disposition: PACU    Tracee Thurman MD  Staff Surgeon   Colon & Rectal Surgery

## 2024-08-20 NOTE — OPERATIVE NOTE ADDENDUM
Certification of Assistant at Surgery       Surgery Date: 8/20/2024     Participating Surgeons:  Surgeons and Role:     * Tracee Thurman MD - Primary    Procedures:  Procedure(s) (LRB):  XI ROBOTIC SIGMOID COLECTOMY, flexible sigmoidoscopy (N/A)  ROBOTIC LYSIS, ADHESIONS (N/A)    Assistant Surgeon's Certification of Necessity:  I understand that section 1842 (b) (6) (d) of the Social Security Act generally prohibits Medicare Part B reasonable charge payment for the services of assistants at surgery in teaching hospitals when qualified residents are available to furnish such services. I certify that the services for which payment is claimed were medically necessary, and that no qualified resident was available to perform the services. I further understand that these services are subject to post-payment review by the Medicare carrier.      NELSON Pearl    08/20/2024  11:02 AM

## 2024-08-20 NOTE — TRANSFER OF CARE
Anesthesia Transfer of Care Note    Patient: Chely Kramer    Procedure(s) Performed: Procedure(s) (LRB):  XI ROBOTIC SIGMOID COLECTOMY, flexible sigmoidoscopy (N/A)  ROBOTIC LYSIS, ADHESIONS (N/A)    Patient location: PACU    Anesthesia Type: general    Transport from OR: Transported from OR on 6-10 L/min O2 by face mask with adequate spontaneous ventilation    Post pain: adequate analgesia    Post assessment: no apparent anesthetic complications    Post vital signs: stable    Level of consciousness: awake and alert    Nausea/Vomiting: no nausea/vomiting    Complications: none    Transfer of care protocol was followed      Last vitals: Visit Vitals  BP (!) 176/79 (BP Location: Right arm, Patient Position: Lying)   Pulse 68   Temp 36 °C (96.8 °F) (Temporal)   Resp 16   Ht 5' (1.524 m)   Wt 66.2 kg (146 lb)   SpO2 100%   Breastfeeding No   BMI 28.51 kg/m²

## 2024-08-20 NOTE — ANESTHESIA PROCEDURE NOTES
Peripheral Block    Patient location during procedure: pre-op   Block not for primary anesthetic.  Reason for block: at surgeon's request and post-op pain management   Post-op Pain Location: abdominal wall pain    End time: 8/20/2024 6:39 AM    Staffing  Authorizing Provider: Wilder Ko MD  Performing Provider: Wilder Ko MD    Staffing  Performed by: Wilder Ko MD  Authorized by: Wilder Ko MD    Preanesthetic Checklist  Completed: patient identified, IV checked, site marked, risks and benefits discussed, surgical consent, monitors and equipment checked, pre-op evaluation and timeout performed  Peripheral Block  Patient position: supine  Prep: ChloraPrep  Patient monitoring: cardiac monitor, heart rate, continuous pulse ox, continuous capnometry and frequent blood pressure checks  Block type: TAP  Laterality: bilateral  Injection technique: single shot  Needle  Needle type: Stimuplex   Needle gauge: 21 G  Needle length: 4 in  Needle localization: ultrasound guidance   -ultrasound image captured on disc.  Assessment  Injection assessment: negative aspiration, negative parasthesia and local visualized surrounding nerve  Paresthesia pain: none  Heart rate change: no  Slow fractionated injection: yes  Pain Tolerance: comfortable throughout block and no complaints  Medications:    Medications: bupivacaine (pf) (MARCAINE) injection 0.25% - Perineural   40 mL - 8/20/2024 6:39:00 AM    Additional Notes  VSS.  [facility] RN monitoring vitals throughout procedure.  Patient tolerated procedure well.

## 2024-08-20 NOTE — PLAN OF CARE
LMSW met with the patient at the bedside. Patient is alert and oriented with no communication barriers. Prior to admission, the patient is independent. Patient denies the use of HH or DME. Patients PCP is correct on the face sheet. Patient choice pharmacy is bedside. Patients' family will transport the patient home at discharge.     [facility] ([address])  Initial Discharge Assessment       Primary Care Provider: Boubacar Mcfadden MD    Admission Diagnosis: Diverticulitis of large intestine with abscess without bleeding [K57.20]  Diverticulitis [K57.92]    Admission Date: 8/20/2024  Expected Discharge Date:     Transition of Care Barriers: (P) None    Payor: [employer] MANAGED MEDICARE / Plan: [employer] MEDICARE HMO / Product Type: Capitation /     Extended Emergency Contact Information  Primary Emergency Contact: Anastasiya Olea  Mobile Phone: [phone]  Relation: Sister  Preferred language: English   needed? No  Secondary Emergency Contact: Ana Delacruz  Mobile Phone: [phone]  Relation: Sister  Preferred language: English   needed? No    Discharge Plan A: (P) Home with family, Home  Discharge Plan B: (P) Home      [facility] [address] [facility]  [address] [address]  Phone: [phone] [phone] Fax: [phone]    [id number] [place], [place] - [address] AT [facility] [address]  [address]  [facility] [address]  Phone: [phone] Fax: [phone]      Initial Assessment (most recent)       Adult Discharge Assessment - 08/20/24 1557          Discharge Assessment    Assessment Type Discharge Planning Assessment     Confirmed/corrected address, phone number and insurance Yes     Confirmed Demographics Correct on Facesheet     Source of Information patient     People in Home alone (P)      Do you expect to return to your current living situation? Yes (P)      Do you have help at home or  someone to help you manage your care at home? No (P)      Prior to hospitilization cognitive status: Alert/Oriented;No Deficits (P)      Current cognitive status: Alert/Oriented;No Deficits (P)      Equipment Currently Used at Home none (P)      Readmission within 30 days? No (P)      Patient currently being followed by outpatient case management? No (P)      Do you currently have service(s) that help you manage your care at home? No (P)      Do you take prescription medications? Yes (P)      Do you have prescription coverage? Yes (P)      Do you have any problems affording any of your prescribed medications? No (P)      Is the patient taking medications as prescribed? yes (P)      How do you get to doctors appointments? car, drives self;family or friend will provide (P)      Are you on dialysis? No (P)      Do you take coumadin? No (P)      Discharge Plan A Home with family;Home (P)      Discharge Plan B Home (P)      Discharge Plan discussed with: Patient (P)      Transition of Care Barriers None (P)

## 2024-08-20 NOTE — BRIEF OP NOTE
[facility] ([place])  Brief Operative Note    SUMMARY     Surgery Date: 8/20/2024     Surgeons and Role:     * Tracee Thurman MD - Primary    Assisting Surgeon: None    Pre-op Diagnosis:  Diverticulitis of large intestine with abscess without bleeding [K57.20]    Post-op Diagnosis:  Post-Op Diagnosis Codes:     * Diverticulitis of large intestine with abscess without bleeding [K57.20]    Procedure(s) (LRB):  XI ROBOTIC SIGMOID COLECTOMY, flexible sigmoidoscopy (N/A)  ROBOTIC LYSIS, ADHESIONS (N/A)    Anesthesia: General/Regional    Implants:  * No implants in log *    Operative Findings: thickened sigmoid colon, adhesions between mesentery and retroperitoneum, small bowel densely adherent to right lower abdominal wall mesh site - this was left intact, colorectal anastomosis side to end air and water tight with two intact donuts with 29mm EEA    Estimated Blood Loss: * No values recorded between 8/20/2024  7:34 AM and 8/20/2024 10:36 AM *    Estimated Blood Loss has been documented.         Specimens:   Specimen (24h ago, onward)       Start     Ordered    08/20/24 1027  Specimen to Pathology, Surgery General Surgery  Once        Comments: Pre-op Diagnosis: Diverticulitis of large intestine with abscess without bleeding [K57.20]Procedure(s):XI ROBOTIC SIGMOID COLECTOMY, flexible sigmoidoscopy Number of specimens: 1Name of specimens: 1. Sigmoid Colon     References:    Click here for ordering Quick Tip   Question Answer Comment   Procedure Type: General Surgery    Specimen Class: Routine/Screening    Release to patient Immediate        08/20/24 1037                    [id number]

## 2024-08-21 LAB
ANION GAP SERPL CALC-SCNC: 8 MMOL/L (ref 8–16)
BASOPHILS # BLD AUTO: 0.02 K/UL (ref 0–0.2)
BASOPHILS NFR BLD: 0.2 % (ref 0–1.9)
BUN SERPL-MCNC: 13 MG/DL (ref 8–23)
CALCIUM SERPL-MCNC: 8.7 MG/DL (ref 8.7–10.5)
CHLORIDE SERPL-SCNC: 103 MMOL/L (ref 95–110)
CO2 SERPL-SCNC: 28 MMOL/L (ref 23–29)
CREAT SERPL-MCNC: 0.9 MG/DL (ref 0.5–1.4)
DIFFERENTIAL METHOD BLD: ABNORMAL
EOSINOPHIL # BLD AUTO: 0 K/UL (ref 0–0.5)
EOSINOPHIL NFR BLD: 0.1 % (ref 0–8)
ERYTHROCYTE [DISTWIDTH] IN BLOOD BY AUTOMATED COUNT: 12.1 % (ref 11.5–14.5)
EST. GFR  (NO RACE VARIABLE): >60 ML/MIN/1.73 M^2
GLUCOSE SERPL-MCNC: 101 MG/DL (ref 70–110)
HCT VFR BLD AUTO: 34.5 % (ref 37–48.5)
HGB BLD-MCNC: 11.2 G/DL (ref 12–16)
IMM GRANULOCYTES # BLD AUTO: 0.03 K/UL (ref 0–0.04)
IMM GRANULOCYTES NFR BLD AUTO: 0.3 % (ref 0–0.5)
LYMPHOCYTES # BLD AUTO: 2.1 K/UL (ref 1–4.8)
LYMPHOCYTES NFR BLD: 23.9 % (ref 18–48)
MAGNESIUM SERPL-MCNC: 1.7 MG/DL (ref 1.6–2.6)
MCH RBC QN AUTO: 32 PG (ref 27–31)
MCHC RBC AUTO-ENTMCNC: 32.5 G/DL (ref 32–36)
MCV RBC AUTO: 99 FL (ref 82–98)
MONOCYTES # BLD AUTO: 0.9 K/UL (ref 0.3–1)
MONOCYTES NFR BLD: 10.8 % (ref 4–15)
NEUTROPHILS # BLD AUTO: 5.5 K/UL (ref 1.8–7.7)
NEUTROPHILS NFR BLD: 64.7 % (ref 38–73)
NRBC BLD-RTO: 0 /100 WBC
OHS QRS DURATION: 74 MS
OHS QTC CALCULATION: 470 MS
PHOSPHATE SERPL-MCNC: 2.6 MG/DL (ref 2.7–4.5)
PLATELET # BLD AUTO: 251 K/UL (ref 150–450)
PMV BLD AUTO: 8.8 FL (ref 9.2–12.9)
POTASSIUM SERPL-SCNC: 3.6 MMOL/L (ref 3.5–5.1)
RBC # BLD AUTO: 3.5 M/UL (ref 4–5.4)
SODIUM SERPL-SCNC: 139 MMOL/L (ref 136–145)
TROPONIN I SERPL DL<=0.01 NG/ML-MCNC: 0.01 NG/ML (ref 0–0.03)
WBC # BLD AUTO: 8.58 K/UL (ref 3.9–12.7)

## 2024-08-21 PROCEDURE — 36415 COLL VENOUS BLD VENIPUNCTURE: CPT | Mod: HCNC | Performed by: SURGERY

## 2024-08-21 PROCEDURE — 21400001 HC TELEMETRY ROOM: Mod: HCNC

## 2024-08-21 PROCEDURE — 93010 ELECTROCARDIOGRAM REPORT: CPT | Mod: HCNC,,, | Performed by: INTERNAL MEDICINE

## 2024-08-21 PROCEDURE — 93005 ELECTROCARDIOGRAM TRACING: CPT | Mod: HCNC

## 2024-08-21 PROCEDURE — 94799 UNLISTED PULMONARY SVC/PX: CPT | Mod: HCNC,XB

## 2024-08-21 PROCEDURE — 25000003 PHARM REV CODE 250: Mod: HCNC | Performed by: STUDENT IN AN ORGANIZED HEALTH CARE EDUCATION/TRAINING PROGRAM

## 2024-08-21 PROCEDURE — 80048 BASIC METABOLIC PNL TOTAL CA: CPT | Mod: HCNC | Performed by: STUDENT IN AN ORGANIZED HEALTH CARE EDUCATION/TRAINING PROGRAM

## 2024-08-21 PROCEDURE — 63600175 PHARM REV CODE 636 W HCPCS: Mod: HCNC | Performed by: STUDENT IN AN ORGANIZED HEALTH CARE EDUCATION/TRAINING PROGRAM

## 2024-08-21 PROCEDURE — 85025 COMPLETE CBC W/AUTO DIFF WBC: CPT | Mod: HCNC | Performed by: STUDENT IN AN ORGANIZED HEALTH CARE EDUCATION/TRAINING PROGRAM

## 2024-08-21 PROCEDURE — 36415 COLL VENOUS BLD VENIPUNCTURE: CPT | Mod: HCNC | Performed by: STUDENT IN AN ORGANIZED HEALTH CARE EDUCATION/TRAINING PROGRAM

## 2024-08-21 PROCEDURE — 84484 ASSAY OF TROPONIN QUANT: CPT | Mod: HCNC | Performed by: SURGERY

## 2024-08-21 PROCEDURE — 94761 N-INVAS EAR/PLS OXIMETRY MLT: CPT | Mod: HCNC

## 2024-08-21 PROCEDURE — 84100 ASSAY OF PHOSPHORUS: CPT | Mod: HCNC | Performed by: STUDENT IN AN ORGANIZED HEALTH CARE EDUCATION/TRAINING PROGRAM

## 2024-08-21 PROCEDURE — 83735 ASSAY OF MAGNESIUM: CPT | Mod: HCNC | Performed by: STUDENT IN AN ORGANIZED HEALTH CARE EDUCATION/TRAINING PROGRAM

## 2024-08-21 RX ADMIN — BRIMONIDINE TARTRATE 1 DROP: 1.5 SOLUTION/ DROPS OPHTHALMIC at 09:08

## 2024-08-21 RX ADMIN — OXYCODONE HYDROCHLORIDE 10 MG: 10 TABLET ORAL at 02:08

## 2024-08-21 RX ADMIN — ACETAMINOPHEN 1000 MG: 10 INJECTION INTRAVENOUS at 02:08

## 2024-08-21 RX ADMIN — ONDANSETRON 4 MG: 2 INJECTION INTRAMUSCULAR; INTRAVENOUS at 09:08

## 2024-08-21 RX ADMIN — LATANOPROST 1 DROP: 50 SOLUTION OPHTHALMIC at 09:08

## 2024-08-21 RX ADMIN — SODIUM CHLORIDE, POTASSIUM CHLORIDE, SODIUM LACTATE AND CALCIUM CHLORIDE: 600; 310; 30; 20 INJECTION, SOLUTION INTRAVENOUS at 05:08

## 2024-08-21 RX ADMIN — ENOXAPARIN SODIUM 40 MG: 40 INJECTION SUBCUTANEOUS at 05:08

## 2024-08-21 RX ADMIN — ACETAMINOPHEN 1000 MG: 500 TABLET ORAL at 10:08

## 2024-08-21 RX ADMIN — OXYCODONE HYDROCHLORIDE AND ACETAMINOPHEN 2500 MG: 500 TABLET ORAL at 09:08

## 2024-08-21 RX ADMIN — ACETAMINOPHEN 1000 MG: 10 INJECTION INTRAVENOUS at 05:08

## 2024-08-21 NOTE — PROGRESS NOTES
Care update:    Patient with T wave inversion noted on telemetry. EKG obtained with ST abnormalities. Patient seen and examined. Vitals stable. Denies chest pain or shortness of breath. Has been tolerating a diet and ambulating. Will order troponin given abnormal EKG but low suspicion for acute process based on exam.     Tracee Thurman MD

## 2024-08-21 NOTE — NURSING
RN notified   per tele, patient has T wave depression on monitor strip. Patient is AAOX4, VSS, RR even and unlabored. Patient is asymptomatic. Patient denies SOB, difficulty breathing. chest pain, palpitations.     EKG done and in chart. Tele d/c per .  No acute distress noted. Will continue to monitor through end of shift.

## 2024-08-21 NOTE — PROGRESS NOTES
Surgery Inpatient Progress Note    Date: 08/21/2024    Overnight events: Possibly passed some flatus, no bowel movement.  No nausea or vomiting.     O:   Vitals:    08/21/24 1013   BP:    Pulse: 67   Resp:    Temp:        Physical Exam   Gen: well developed female, NAD  HEENT: normocephalic, atraumatic, PERRL, EOMI   CV: RRR, no murmurs  Resp: nonlabored, CTAB   Abd: soft, incisions well approximated without erythema or drainage   Msk: no gross deformities, no cyanosis or edema                Component Ref Range & Units 05:15  (8/21/24) 1 d ago  (8/20/24) 3 mo ago  (4/25/24) 5 mo ago  (3/13/24) 5 mo ago  (2/25/24) 5 mo ago  (2/24/24) 6 mo ago  (2/23/24)   WBC 3.90 - 12.70 K/uL 8.58 6.35 9.67 9.07 8.10 10.61 9.10   RBC 4.00 - 5.40 M/uL 3.50 Low  3.84 Low  3.52 Low  4.03 3.96 Low  4.33 3.94 Low    Hemoglobin 12.0 - 16.0 g/dL 11.2 Low  12.3 11.3 Low  12.6 12.2 13.1 12.2   Hematocrit 37.0 - 48.5 % 34.5 Low  37.0 33.8 Low  37.8 36.9 Low  40.1 37.0   MCV 82 - 98 fL 99 High  96 96 94 93 93 94   MCH 27.0 - 31.0 pg 32.0 High  32.0 High  32.1 High  31.3 High  30.8 30.3 31.0   MCHC 32.0 - 36.0 g/dL 32.5 33.2 33.4 33.3 33.1 32.7 33.0   RDW 11.5 - 14.5 % 12.1 12.3 14.1 12.6 12.0 11.8 12.1   Platelets 150 - 450 K/uL 251 271 251 405                   Component Ref Range & Units 05:15  (8/21/24) 8 d ago  (8/13/24) 3 mo ago  (4/25/24) 5 mo ago  (3/13/24) 5 mo ago  (2/25/24) 5 mo ago  (2/24/24) 6 mo ago  (2/23/24)   Sodium 136 - 145 mmol/L 139 139 138 139 138 136 137   Potassium 3.5 - 5.1 mmol/L 3.6 4.3 4.0 3.7 4.0 3.4 Low  3.7   Chloride 95 - 110 mmol/L 103 103 105 102 100 98 99   CO2 23 - 29 mmol/L 28 23 25 26 29 26 23   Glucose 70 - 110 mg/dL 101 103 107 91 105 112 High  75   BUN 8 - 23 mg/dL 13 23 16 14 8 8 13   Creatinine 0.5 - 1.4 mg/dL 0.9 0.9 0.9 0.7 0.7 0.7 0.7   Calcium 8.7 - 10.5 mg/dL 8.7 10.0 9.0 10.0 9.1 9.5 9.1   Anion Gap 8 - 16 mmol/L 8 13 8 11 9 12 15   eGFR >60 mL/min/1.73 m^2 >60 >60 >60 >60 >6             Assessment and plan:   Chely Kramer is a 74 y.o. female who is s/p robotic sigmoid colectomy for recurrent, complicated diverticulitis     - multimodal pain control, avoid nephrotoxic medications   - diet as tolerated, await return of bowel function   - saline lock IV   - OOB, ambulate       Tracee Thurman MD  Staff Surgeon   Colon & Rectal Surgery

## 2024-08-21 NOTE — PLAN OF CARE
Problem: Adult Inpatient Plan of Care  Goal: Plan of Care Review  Outcome: Progressing  Goal: Patient-Specific Goal (Individualized)  Outcome: Progressing  Goal: Absence of Hospital-Acquired Illness or Injury  Outcome: Progressing  Goal: Optimal Comfort and Wellbeing  Outcome: Progressing  Goal: Readiness for Transition of Care  Outcome: Progressing     Problem: Wound  Goal: Optimal Coping  Outcome: Progressing  Goal: Optimal Functional Ability  Outcome: Progressing  Goal: Absence of Infection Signs and Symptoms  Outcome: Progressing  Goal: Improved Oral Intake  Outcome: Progressing  Goal: Optimal Pain Control and Function  Outcome: Progressing  Goal: Skin Health and Integrity  Outcome: Progressing  Goal: Optimal Wound Healing  Outcome: Progressing     Problem: Fall Injury Risk  Goal: Absence of Fall and Fall-Related Injury  Outcome: Progressing    POC reviewed with patient. Patient verbalized understanding. AAOX 4. VSS. Pain managed per prn medication orders. LR IV fluids infusing at 75 mL/hr.  Call bell left within reach. Bed lowered and wheels locked. Patient free of falls and injury

## 2024-08-22 VITALS
HEART RATE: 78 BPM | HEIGHT: 60 IN | BODY MASS INDEX: 28.66 KG/M2 | SYSTOLIC BLOOD PRESSURE: 127 MMHG | OXYGEN SATURATION: 99 % | DIASTOLIC BLOOD PRESSURE: 58 MMHG | RESPIRATION RATE: 16 BRPM | WEIGHT: 146 LBS | TEMPERATURE: 99 F

## 2024-08-22 LAB
FINAL PATHOLOGIC DIAGNOSIS: NORMAL
GROSS: NORMAL
Lab: NORMAL

## 2024-08-22 PROCEDURE — 99900035 HC TECH TIME PER 15 MIN (STAT): Mod: HCNC

## 2024-08-22 PROCEDURE — 25000003 PHARM REV CODE 250: Mod: HCNC | Performed by: STUDENT IN AN ORGANIZED HEALTH CARE EDUCATION/TRAINING PROGRAM

## 2024-08-22 PROCEDURE — 94761 N-INVAS EAR/PLS OXIMETRY MLT: CPT | Mod: HCNC

## 2024-08-22 RX ORDER — OXYCODONE HYDROCHLORIDE 5 MG/1
5 TABLET ORAL EVERY 4 HOURS PRN
Qty: 25 TABLET | Refills: 0 | Status: SHIPPED | OUTPATIENT
Start: 2024-08-22

## 2024-08-22 RX ADMIN — OXYCODONE HYDROCHLORIDE AND ACETAMINOPHEN 2500 MG: 500 TABLET ORAL at 09:08

## 2024-08-22 RX ADMIN — ACETAMINOPHEN 1000 MG: 500 TABLET ORAL at 05:08

## 2024-08-22 RX ADMIN — BRIMONIDINE TARTRATE 1 DROP: 1.5 SOLUTION/ DROPS OPHTHALMIC at 09:08

## 2024-08-22 NOTE — PLAN OF CARE
Patient is AAOx4, care reviewed with patient       Problem: Adult Inpatient Plan of Care  Goal: Plan of Care Review  Outcome: Progressing  Goal: Patient-Specific Goal (Individualized)  Outcome: Progressing  Goal: Absence of Hospital-Acquired Illness or Injury  Outcome: Progressing  Goal: Optimal Comfort and Wellbeing  Outcome: Progressing  Goal: Readiness for Transition of Care  Outcome: Progressing     Problem: Wound  Goal: Optimal Coping  Outcome: Progressing  Goal: Optimal Functional Ability  Outcome: Progressing  Goal: Absence of Infection Signs and Symptoms  Outcome: Progressing  Goal: Improved Oral Intake  Outcome: Progressing  Goal: Optimal Pain Control and Function  Outcome: Progressing  Goal: Skin Health and Integrity  Outcome: Progressing  Goal: Optimal Wound Healing  Outcome: Progressing     Problem: Fall Injury Risk  Goal: Absence of Fall and Fall-Related Injury  Outcome: Progressing

## 2024-08-22 NOTE — DISCHARGE INSTRUCTIONS
Take tylenol for mild pain.  Take oxycodone as needed for breakthrough pain.   Okay to shower, let warm soapy water run over incisions and pat dry.  Do not soak in tub for 2 weeks.   No heavy lifting for 4 weeks  Do not drive while taking narcotics  Call [phone] for fever > 101, worsening redness, pain or drainage from incisions, inability to tolerate oral intake

## 2024-08-22 NOTE — DISCHARGE SUMMARY
[facility]urg ([address])  Colorectal Surgery  Discharge Summary      Patient Name: Chely Kramer  MRN: [id number]  Admission Date: 8/20/2024  Hospital Length of Stay: 2 days  Discharge Date and Time:  08/22/2024 7:31 AM  Attending Physician: Tracee Thurman MD   Discharging Provider: Tracee Thurman MD  Primary Care Provider: Boubacar Mcfadden MD     HPI:  No notes on file    Procedure(s) (LRB):  XI ROBOTIC SIGMOID COLECTOMY, flexible sigmoidoscopy (N/A)  ROBOTIC LYSIS, ADHESIONS (N/A)     Hospital Course:  Ms. Kramer presented for robotic sigmoid colectomy on 8/20 for recurrent diverticulitis.  Post-operatively, she was admitted for recovery.   Her guo was removed on POD 1 and she was able to void spontaneously.  She had return of bowel function.  She was tolerating a diet.  Her pain was controlled with oral medications.  She was deemed appropriate for discharge.     Goals of Care Treatment Preferences:  Code Status: Full Code          Significant Diagnostic Studies: N/A    Pending Diagnostic Studies:       Procedure Component Value Units Date/Time    Specimen to Pathology, Surgery General Surgery [[id number]] Collected: 08/20/24 1038    Order Status: Sent Lab Status: In process Updated: 08/20/24 1439    Specimen: Tissue           Final Active Diagnoses:    Diagnosis Date Noted POA    Diverticulitis of large intestine with abscess without bleeding [K57.20] 01/29/2024 Yes      Problems Resolved During this Admission:      Discharged Condition: good    Disposition: Home or Self Care    Follow Up:   Follow-up Information       Tracee Thurman MD Follow up.    Specialty: Colon and Rectal Surgery  Contact information:  [address] [place] [address] [id number] [phone]                           Patient Instructions:   Take tylenol for mild pain.  Take oxycodone as needed for breakthrough pain.   Okay to shower, let warm soapy water run over incisions and pat dry.  Do not soak in tub for 2 weeks.   No  heavy lifting for 4 weeks  Do not drive while taking narcotics  Call [phone] for fever > 101, worsening redness, pain or drainage from incisions, inability to tolerate oral intake      Medications:  Reconciled Home Medications:      Medication List        START taking these medications      oxyCODONE 5 MG immediate release tablet  Commonly known as: ROXICODONE  Take 1 tablet (5 mg total) by mouth every 4 (four) hours as needed.            CONTINUE taking these medications      acetaminophen 325 MG tablet  Commonly known as: TYLENOL  Take 2 tablets (650 mg total) by mouth every 6 (six) hours as needed for Pain.     ascorbic acid (vitamin C) 500 MG tablet  Commonly known as: VITAMIN C  Take 2,500 mg by mouth once daily.     brimonidine 0.2% 0.2 % Drop  Commonly known as: ALPHAGAN  Place 1 drop into both eyes 2 (two) times daily.     latanoprost 0.005 % ophthalmic solution  Place 1 drop into both eyes every evening.            STOP taking these medications      amoxicillin-clavulanate 875-125mg 875-125 mg per tablet  Commonly known as: AUGMENTIN              Tracee Thurman MD  Colorectal Surgery  [facility]urg ([address])

## 2024-08-22 NOTE — NURSING
Patient cleared for discharge by  and MARELY Gonzalez. AVS/discharge instructions printed, reviewed with, and given to patient. Patient verbalized understanding of instructions. Left arm PIV removed, cathter tip intact, pressure held, dressing placed. Biobet removed. Patient received  medication from pharmacy. Wheelchair transport requested tor patient.

## 2024-08-22 NOTE — PLAN OF CARE
Plan of care is ongoing.    Problem: Adult Inpatient Plan of Care  Goal: Plan of Care Review  Outcome: Progressing  Goal: Patient-Specific Goal (Individualized)  Outcome: Progressing  Goal: Absence of Hospital-Acquired Illness or Injury  Outcome: Progressing  Goal: Optimal Comfort and Wellbeing  Outcome: Progressing  Goal: Readiness for Transition of Care  Outcome: Progressing     Problem: Wound  Goal: Optimal Coping  Outcome: Progressing  Goal: Optimal Functional Ability  Outcome: Progressing  Goal: Absence of Infection Signs and Symptoms  Outcome: Progressing  Goal: Improved Oral Intake  Outcome: Progressing  Goal: Optimal Pain Control and Function  Outcome: Progressing  Goal: Skin Health and Integrity  Outcome: Progressing  Goal: Optimal Wound Healing  Outcome: Progressing     Problem: Fall Injury Risk  Goal: Absence of Fall and Fall-Related Injury  Outcome: Progressing

## 2024-08-22 NOTE — PLAN OF CARE
[facility] ([address])  Discharge Final Note    Primary Care Provider: Boubacar Mcfadden MD    Expected Discharge Date: 8/22/2024    Final Discharge Note (most recent)       Final Note - 08/22/24 0806          Final Note    Assessment Type Final Discharge Note (P)      Anticipated Discharge Disposition Home or Self Care (P)      Hospital Resources/Appts/Education Provided Provided patient/caregiver with written discharge plan information;Appointments scheduled and added to AVS (P)         Post-Acute Status    Discharge Delays None known at this time (P)                      Important Message from Medicare             Contact Info       Tracee Thurman MD   Specialty: Colon and Rectal Surgery    [address] [address]   Phone: [phone]       Next Steps: Follow up          Pt states she lives independently at home.     Family to provide transportation home.    No DC needs from CM perspective.

## 2024-08-23 ENCOUNTER — PATIENT OUTREACH (OUTPATIENT)
Dept: ADMINISTRATIVE | Facility: CLINIC | Age: 74
End: 2024-08-23
Payer: MEDICARE

## 2024-08-23 NOTE — PROGRESS NOTES
C3 nurse spoke with Chely Kramer for a TCC post hospital discharge follow up call. The patient has a scheduled [facility] appointment with Boubacar Mcfadden MD on 08/28/2024 @ 1020.

## 2024-08-23 NOTE — PHYSICIAN QUERY
Please clarify the pathology findings:  Pathology findings noted above are ruled in/confirmed as diagnoses

## 2024-08-26 ENCOUNTER — PATIENT OUTREACH (OUTPATIENT)
Dept: ADMINISTRATIVE | Facility: HOSPITAL | Age: 74
End: 2024-08-26
Payer: MEDICARE

## 2024-08-26 NOTE — PROGRESS NOTES
Health Maintenance Due   Topic Date Due    Shingles Vaccine (1 of 2) Never done    COVID-19 Vaccine (7 - 2023-24 season) 12/19/2023    Health Maintenance reviewed, updated and links triggered. ([place]) 8/26/24

## 2024-08-28 ENCOUNTER — OFFICE VISIT (OUTPATIENT)
Dept: INTERNAL MEDICINE | Facility: CLINIC | Age: 74
End: 2024-08-28
Payer: MEDICARE

## 2024-08-28 VITALS
BODY MASS INDEX: 28.48 KG/M2 | DIASTOLIC BLOOD PRESSURE: 60 MMHG | SYSTOLIC BLOOD PRESSURE: 125 MMHG | HEIGHT: 60 IN | OXYGEN SATURATION: 96 % | WEIGHT: 145.06 LBS | HEART RATE: 74 BPM

## 2024-08-28 DIAGNOSIS — Z12.83 SKIN CANCER SCREENING: Primary | ICD-10-CM

## 2024-08-28 DIAGNOSIS — Z09 HOSPITAL DISCHARGE FOLLOW-UP: ICD-10-CM

## 2024-08-28 DIAGNOSIS — R01.1 SYSTOLIC MURMUR: ICD-10-CM

## 2024-08-28 PROCEDURE — 3044F HG A1C LEVEL LT 7.0%: CPT | Mod: HCNC,CPTII,S$GLB, | Performed by: STUDENT IN AN ORGANIZED HEALTH CARE EDUCATION/TRAINING PROGRAM

## 2024-08-28 PROCEDURE — 1101F PT FALLS ASSESS-DOCD LE1/YR: CPT | Mod: HCNC,CPTII,S$GLB, | Performed by: STUDENT IN AN ORGANIZED HEALTH CARE EDUCATION/TRAINING PROGRAM

## 2024-08-28 PROCEDURE — 3074F SYST BP LT 130 MM HG: CPT | Mod: HCNC,CPTII,S$GLB, | Performed by: STUDENT IN AN ORGANIZED HEALTH CARE EDUCATION/TRAINING PROGRAM

## 2024-08-28 PROCEDURE — 1159F MED LIST DOCD IN RCRD: CPT | Mod: HCNC,CPTII,S$GLB, | Performed by: STUDENT IN AN ORGANIZED HEALTH CARE EDUCATION/TRAINING PROGRAM

## 2024-08-28 PROCEDURE — 3078F DIAST BP <80 MM HG: CPT | Mod: HCNC,CPTII,S$GLB, | Performed by: STUDENT IN AN ORGANIZED HEALTH CARE EDUCATION/TRAINING PROGRAM

## 2024-08-28 PROCEDURE — 3288F FALL RISK ASSESSMENT DOCD: CPT | Mod: HCNC,CPTII,S$GLB, | Performed by: STUDENT IN AN ORGANIZED HEALTH CARE EDUCATION/TRAINING PROGRAM

## 2024-08-28 PROCEDURE — 99999 PR PBB SHADOW E&M-EST. PATIENT-LVL III: CPT | Mod: PBBFAC,HCNC,, | Performed by: STUDENT IN AN ORGANIZED HEALTH CARE EDUCATION/TRAINING PROGRAM

## 2024-08-28 PROCEDURE — 1126F AMNT PAIN NOTED NONE PRSNT: CPT | Mod: HCNC,CPTII,S$GLB, | Performed by: STUDENT IN AN ORGANIZED HEALTH CARE EDUCATION/TRAINING PROGRAM

## 2024-08-28 PROCEDURE — 99213 OFFICE O/P EST LOW 20 MIN: CPT | Mod: HCNC,S$GLB,, | Performed by: STUDENT IN AN ORGANIZED HEALTH CARE EDUCATION/TRAINING PROGRAM

## 2024-09-09 ENCOUNTER — OFFICE VISIT (OUTPATIENT)
Dept: SURGERY | Facility: CLINIC | Age: 74
End: 2024-09-09
Payer: MEDICARE

## 2024-09-09 VITALS
HEART RATE: 84 BPM | BODY MASS INDEX: 28.33 KG/M2 | RESPIRATION RATE: 19 BRPM | DIASTOLIC BLOOD PRESSURE: 63 MMHG | OXYGEN SATURATION: 97 % | SYSTOLIC BLOOD PRESSURE: 130 MMHG | HEIGHT: 60 IN

## 2024-09-09 DIAGNOSIS — K57.20 DIVERTICULITIS OF LARGE INTESTINE WITH ABSCESS WITHOUT BLEEDING: Primary | ICD-10-CM

## 2024-09-09 PROCEDURE — 99999 PR PBB SHADOW E&M-EST. PATIENT-LVL III: CPT | Mod: PBBFAC,HCNC,, | Performed by: SURGERY

## 2024-09-09 PROCEDURE — 1126F AMNT PAIN NOTED NONE PRSNT: CPT | Mod: HCNC,CPTII,S$GLB, | Performed by: SURGERY

## 2024-09-09 PROCEDURE — 1101F PT FALLS ASSESS-DOCD LE1/YR: CPT | Mod: HCNC,CPTII,S$GLB, | Performed by: SURGERY

## 2024-09-09 PROCEDURE — 3078F DIAST BP <80 MM HG: CPT | Mod: HCNC,CPTII,S$GLB, | Performed by: SURGERY

## 2024-09-09 PROCEDURE — 3075F SYST BP GE 130 - 139MM HG: CPT | Mod: HCNC,CPTII,S$GLB, | Performed by: SURGERY

## 2024-09-09 PROCEDURE — 99024 POSTOP FOLLOW-UP VISIT: CPT | Mod: HCNC,S$GLB,, | Performed by: SURGERY

## 2024-09-09 PROCEDURE — 1159F MED LIST DOCD IN RCRD: CPT | Mod: HCNC,CPTII,S$GLB, | Performed by: SURGERY

## 2024-09-09 PROCEDURE — 3288F FALL RISK ASSESSMENT DOCD: CPT | Mod: HCNC,CPTII,S$GLB, | Performed by: SURGERY

## 2024-09-09 PROCEDURE — 3044F HG A1C LEVEL LT 7.0%: CPT | Mod: HCNC,CPTII,S$GLB, | Performed by: SURGERY

## 2024-09-09 NOTE — PROGRESS NOTES
Colon & Rectal Surgery Clinic Follow Up    HPI:   Chely Kramer is a 74 y.o. female who presents for follow up of diverticulitis     8/20/24 robotic sigmoid colectomy    Interval history:   Healing well.  Decreased appetite.  Having bowel movements, although reports constipation.  Taking miralax as needed.  Has returned to work.  Overall, feeling well.     Objective:   Vitals:    09/09/24 0939   BP: 130/63   Pulse: 84   Resp: 19        Physical Exam   Gen: well developed female, NAD  HEENT: normocephalic, atraumatic, PERRL, EOMI   CV: RRR, no murmurs  Resp: nonlabored, CTAB   Abd: soft, incisions well approximated without erythema or drainage   MSK: no gross deformities, no cyanosis or edema     Pathology:   Value: Colon, sigmoid colon, resection:  - Benign colonic tissue with diverticula  - No significant active inflammation  - Negative for malignancy       Assessment and Plan:   Chely Kramer  is a 74 y.o. female who presents for follow up of diverticulitis     - pathology reviewed and benign   - okay to continue laxative as needed  - diet as tolerated  - follow up in 4 weeks if any concerns       Tracee Thurman MD  Staff Surgeon   Colon & Rectal Surgery

## 2024-10-20 ENCOUNTER — PATIENT MESSAGE (OUTPATIENT)
Dept: RESEARCH | Facility: OTHER | Age: 74
End: 2024-10-20
Payer: MEDICARE

## 2024-11-06 ENCOUNTER — PATIENT MESSAGE (OUTPATIENT)
Dept: INTERNAL MEDICINE | Facility: CLINIC | Age: 74
End: 2024-11-06
Payer: MEDICARE

## 2024-11-07 ENCOUNTER — OFFICE VISIT (OUTPATIENT)
Dept: INTERNAL MEDICINE | Facility: CLINIC | Age: 74
End: 2024-11-07
Payer: MEDICARE

## 2024-11-07 VITALS
HEIGHT: 60 IN | HEART RATE: 88 BPM | SYSTOLIC BLOOD PRESSURE: 130 MMHG | BODY MASS INDEX: 27.32 KG/M2 | WEIGHT: 139.13 LBS | OXYGEN SATURATION: 98 % | DIASTOLIC BLOOD PRESSURE: 70 MMHG

## 2024-11-07 DIAGNOSIS — H40.1124 PRIMARY OPEN ANGLE GLAUCOMA (POAG) OF LEFT EYE, INDETERMINATE STAGE: ICD-10-CM

## 2024-11-07 DIAGNOSIS — R76.8 HEPATITIS C ANTIBODY POSITIVE IN BLOOD: ICD-10-CM

## 2024-11-07 DIAGNOSIS — K57.20 DIVERTICULITIS OF LARGE INTESTINE WITH ABSCESS WITHOUT BLEEDING: ICD-10-CM

## 2024-11-07 DIAGNOSIS — R35.0 FREQUENCY OF URINATION: ICD-10-CM

## 2024-11-07 DIAGNOSIS — Z78.9 KNOWN MEDICAL PROBLEMS: ICD-10-CM

## 2024-11-07 DIAGNOSIS — Z67.41 BLOOD TYPE O-: ICD-10-CM

## 2024-11-07 DIAGNOSIS — E66.811 OBESITY (BMI 30.0-34.9): ICD-10-CM

## 2024-11-07 DIAGNOSIS — E44.0 MODERATE MALNUTRITION: ICD-10-CM

## 2024-11-07 DIAGNOSIS — Z87.19 HISTORY OF COLONIC DIVERTICULITIS: ICD-10-CM

## 2024-11-07 DIAGNOSIS — I70.0 AORTIC ATHEROSCLEROSIS: ICD-10-CM

## 2024-11-07 DIAGNOSIS — Z85.828 HX OF BASAL CELL CARCINOMA: ICD-10-CM

## 2024-11-07 DIAGNOSIS — M19.031 PRIMARY OSTEOARTHRITIS OF RIGHT WRIST: ICD-10-CM

## 2024-11-07 DIAGNOSIS — M85.89 OSTEOPENIA OF MULTIPLE SITES: ICD-10-CM

## 2024-11-07 DIAGNOSIS — H91.90 HEARING LOSS, UNSPECIFIED HEARING LOSS TYPE, UNSPECIFIED LATERALITY: ICD-10-CM

## 2024-11-07 DIAGNOSIS — N28.89 RENAL MASS: ICD-10-CM

## 2024-11-07 DIAGNOSIS — Z00.00 ENCOUNTER FOR PREVENTIVE HEALTH EXAMINATION: Primary | ICD-10-CM

## 2024-11-07 DIAGNOSIS — C64.2 RENAL CELL CARCINOMA OF LEFT KIDNEY: ICD-10-CM

## 2024-11-07 PROCEDURE — 99999 PR PBB SHADOW E&M-EST. PATIENT-LVL V: CPT | Mod: PBBFAC,HCNC,, | Performed by: NURSE PRACTITIONER

## 2024-11-07 NOTE — PATIENT INSTRUCTIONS
Counseling and Referral of Other Preventative  (Italic type indicates deductible and co-insurance are waived)    Patient Name: Chely Kramer  Today's Date: 11/7/2024    Health Maintenance       Date Due Completion Date    Shingles Vaccine (1 of 2) Never done ---    COVID-19 Vaccine (7 - 2024-25 season) 09/01/2024 10/24/2023    DEXA Scan 01/31/2027 1/31/2024    Colorectal Cancer Screening 09/28/2027 9/28/2022    Lipid Panel 01/18/2029 1/18/2024    TETANUS VACCINE 01/23/2029 1/23/2019    Override on 1/1/2015: Done        No orders of the defined types were placed in this encounter.    The following information is provided to all patients.  This information is to help you find resources for any of the problems found today that may be affecting your health:                  Living healthy guide: www.[facility].[place].gov      Understanding Diabetes: www.diabetes.org      Eating healthy: www.cdc.gov/healthyweight      [facility] home safety checklist: www.cdc.gov/steadi/patient.html      Agency on Aging: www.goea.[place].[facility]      Alcoholics anonymous (AA): www.aa.org      Physical Activity: www.terry.nih.gov/tm6qruz      Tobacco use: www.quitwithusla.org

## 2024-11-07 NOTE — PROGRESS NOTES
Chely Kramer presented for an initial Medicare AWV today. The following components were reviewed and updated:    Medical history  Family History  Social history  Allergies and Current Medications  Health Risk Assessment  Health Maintenance  Care Team    **See Completed Assessments for Annual Wellness visit with in the encounter summary    The following assessments were completed:  Depression Screening  Cognitive function Screening  Timed Get Up Test  Whisper Test      Opioid documentation:      Patient does not have a current opioid prescription.          Vitals:    11/07/24 0924   BP: 130/70   BP Location: Right arm   Patient Position: Sitting   Pulse: 88   SpO2: 98%   Weight: 63.1 kg (139 lb 1.8 oz)   Height: 5' (1.524 m)     Body mass index is 27.17 kg/m².       Physical Exam  Constitutional:       Appearance: Normal appearance.   Pulmonary:      Effort: Pulmonary effort is normal.      Breath sounds: Normal breath sounds.   Neurological:      Mental Status: She is alert.           Diagnoses and health risks identified today and associated recommendations/orders:  1. Encounter for preventive health examination  Annual Health Risk Assessment (HRA) visit today.  Counseling and referral of health maintenance and preventative health measures performed.  Patient given annual wellness paperwork to take home.  Encouraged to return in 1 year for subsequent HRA visit.     2. Aortic atherosclerosis  Chronic. Stable. Continue current treatment plan as previously prescribed by PCP.    3. Frequency of urination  Chronic. Stable. Continue current treatment plan as previously prescribed by PCP.    4. Renal mass  Chronic. Stable. Continue current treatment plan as previously prescribed by PCP.    5. Blood type O-  Chronic. Stable. Continue current treatment plan as previously prescribed by PCP.    6. Hx of basal cell carcinoma  Chronic. Stable. Continue current treatment plan as previously prescribed by PCP.    7. Renal cell  carcinoma of left kidney  Chronic. Stable. Continue current treatment plan as previously prescribed by PCP.    8. Primary open angle glaucoma (POAG) of left eye, indeterminate stage  Chronic. Stable. Continue current treatment plan as previously prescribed by PCP.    9. Moderate malnutrition  Chronic. Stable. Continue current treatment plan as previously prescribed by PCP.    10. Obesity (BMI 30.0-34.9)  Chronic. Stable. Encouraged to increase exercise as tolerated and improve diet to heart healthy, low sodium diet. Continue current treatment plan as previously prescribed by PCP.    11. Diverticulitis of large intestine with abscess without bleeding  Chronic. Stable. Continue current treatment plan as previously prescribed by PCP.    12. Hepatitis C antibody positive in blood  Chronic. Stable. Continue current treatment plan as previously prescribed by PCP.    13. History of colonic diverticulitis  Chronic. Stable. Continue current treatment plan as previously prescribed by PCP.    14. Primary osteoarthritis of right wrist  Chronic. Stable. Continue current treatment plan as previously prescribed by PCP.    15. Osteopenia of multiple sites  Chronic. Stable. Continue current treatment plan as previously prescribed by PCP.        Provided Chely with a 5-10 year written screening schedule and personal prevention plan. Recommendations were developed using the USPSTF age appropriate recommendations. Education, counseling, and referrals were provided as needed.  After Visit Summary printed and given to patient which includes a list of additional screenings\tests needed.    Follow up in about 1 year (around 11/7/2025).      Juan Veliz NP

## 2024-12-19 ENCOUNTER — OFFICE VISIT (OUTPATIENT)
Dept: DERMATOLOGY | Facility: CLINIC | Age: 74
End: 2024-12-19
Payer: MEDICARE

## 2024-12-19 DIAGNOSIS — L82.1 SEBORRHEIC KERATOSES: ICD-10-CM

## 2024-12-19 DIAGNOSIS — L72.0 EPIDERMAL INCLUSION CYST: ICD-10-CM

## 2024-12-19 DIAGNOSIS — Z85.828 HISTORY OF NONMELANOMA SKIN CANCER: ICD-10-CM

## 2024-12-19 DIAGNOSIS — Z12.83 SKIN CANCER SCREENING: ICD-10-CM

## 2024-12-19 DIAGNOSIS — D48.5 NEOPLASM OF UNCERTAIN BEHAVIOR OF SKIN: Primary | ICD-10-CM

## 2024-12-19 DIAGNOSIS — L81.4 LENTIGINES: ICD-10-CM

## 2024-12-19 PROCEDURE — 1126F AMNT PAIN NOTED NONE PRSNT: CPT | Mod: HCNC,CPTII,S$GLB, | Performed by: STUDENT IN AN ORGANIZED HEALTH CARE EDUCATION/TRAINING PROGRAM

## 2024-12-19 PROCEDURE — 11102 TANGNTL BX SKIN SINGLE LES: CPT | Mod: HCNC,S$GLB,, | Performed by: STUDENT IN AN ORGANIZED HEALTH CARE EDUCATION/TRAINING PROGRAM

## 2024-12-19 PROCEDURE — 1159F MED LIST DOCD IN RCRD: CPT | Mod: HCNC,CPTII,S$GLB, | Performed by: STUDENT IN AN ORGANIZED HEALTH CARE EDUCATION/TRAINING PROGRAM

## 2024-12-19 PROCEDURE — 3044F HG A1C LEVEL LT 7.0%: CPT | Mod: HCNC,CPTII,S$GLB, | Performed by: STUDENT IN AN ORGANIZED HEALTH CARE EDUCATION/TRAINING PROGRAM

## 2024-12-19 PROCEDURE — 3288F FALL RISK ASSESSMENT DOCD: CPT | Mod: HCNC,CPTII,S$GLB, | Performed by: STUDENT IN AN ORGANIZED HEALTH CARE EDUCATION/TRAINING PROGRAM

## 2024-12-19 PROCEDURE — 1160F RVW MEDS BY RX/DR IN RCRD: CPT | Mod: HCNC,CPTII,S$GLB, | Performed by: STUDENT IN AN ORGANIZED HEALTH CARE EDUCATION/TRAINING PROGRAM

## 2024-12-19 PROCEDURE — 99999 PR PBB SHADOW E&M-EST. PATIENT-LVL III: CPT | Mod: PBBFAC,HCNC,, | Performed by: STUDENT IN AN ORGANIZED HEALTH CARE EDUCATION/TRAINING PROGRAM

## 2024-12-19 PROCEDURE — 99203 OFFICE O/P NEW LOW 30 MIN: CPT | Mod: 25,HCNC,S$GLB, | Performed by: STUDENT IN AN ORGANIZED HEALTH CARE EDUCATION/TRAINING PROGRAM

## 2024-12-19 PROCEDURE — 1101F PT FALLS ASSESS-DOCD LE1/YR: CPT | Mod: HCNC,CPTII,S$GLB, | Performed by: STUDENT IN AN ORGANIZED HEALTH CARE EDUCATION/TRAINING PROGRAM

## 2024-12-19 NOTE — PROGRESS NOTES
Subjective:      Patient ID:  Chely Kramer is a 74 y.o. female who presents for No chief complaint on file.    Chely Kramer is a 74 y.o. female who presents for: UBSE/evaluation of skin lesions.    New patient    The patient has the following lesions of concern:  Location: left cheek   Duration: years   Symptoms: gotten bigger, itch   Relieving factors/Previous treatments: none     Patient with new area of concern:   Location: upper back   Duration: years   Previous treatments: none   Not currently bothersome, has become inflamed before    Pertinent history:  History of blistering sunburns: Yes  History of tanning bed use: No  Family history of melanoma: No, but father and sister have had BCC / SCC   Personal history of mole removal: Yes  Personal history of skin cancer: Yes  Hx of BCC in 2013 on right cheek area       Review of Systems   Skin:  Negative for daily sunscreen use, activity-related sunscreen use and recent sunburn.   Hematologic/Lymphatic: Does not bruise/bleed easily.       Objective:   Physical Exam   Skin:   Areas Examined (abnormalities noted in diagram):   Scalp / Hair Palpated and Inspected  Head / Face Inspection Performed  Neck Inspection Performed  Chest / Axilla Inspection Performed  Abdomen Inspection Performed  Back Inspection Performed  RUE Inspected  LUE Inspection Performed                 Diagram Legend     Erythematous scaling macule/papule c/w actinic keratosis       Vascular papule c/w angioma      Pigmented verrucoid papule/plaque c/w seborrheic keratosis      Yellow umbilicated papule c/w sebaceous hyperplasia      Irregularly shaped tan macule c/w lentigo     1-2 mm smooth white papules consistent with Milia      Movable subcutaneous cyst with punctum c/w epidermal inclusion cyst      Subcutaneous movable cyst c/w pilar cyst      Firm pink to brown papule c/w dermatofibroma      Pedunculated fleshy papule(s) c/w skin tag(s)      Evenly pigmented macule c/w junctional  nevus     Mildly variegated pigmented, slightly irregular-bordered macule c/w mildly atypical nevus      Flesh colored to evenly pigmented papule c/w intradermal nevus       Pink pearly papule/plaque c/w basal cell carcinoma      Erythematous hyperkeratotic cursted plaque c/w SCC      Surgical scar with no sign of skin cancer recurrence      Open and closed comedones      Inflammatory papules and pustules      Verrucoid papule consistent consistent with wart     Erythematous eczematous patches and plaques     Dystrophic onycholytic nail with subungual debris c/w onychomycosis     Umbilicated papule    Erythematous-base heme-crusted tan verrucoid plaque consistent with inflamed seborrheic keratosis     Erythematous Silvery Scaling Plaque c/w Psoriasis     See annotation              Assessment / Plan:        Neoplasm uncertain behavior skin  Shave biopsy procedure note:    Shave biopsy performed after verbal consent including risk of infection, scar, recurrence, need for additional treatment of site. Area prepped with alcohol, anesthetized with approximately 1.0cc of 1% lidocaine with epinephrine. Lesional tissue shaved with razor blade. Hemostasis achieved with application of aluminum chloride followed by hyfrecation. No complications. Dressing applied. Wound care explained.    Seborrheic keratoses  -Reassurance on benign nature, no treatment required    EIC, milia  Reassurance given to patient. No treatment is necessary.   Discussed treatment options - excision vs observation. Cysts may recur with excision. Pt will defer treatment at this time.     Lentigines  - Reassurance on benign nature, encouraged sun protection    History of NMSC  Examined areas of prior scars, no evidence of recurrence  - Continue skin exams at least annually, increased risk of additional skin cancers developing in the future  - I discussed the importance of sun protection with the patient. Recommend daily use of SPF 30+ physical or mineral  sunscreen, apply 15 minutes before going outdoors and reapply every 2 hours. Discussed wide-brimmed hats and UPF 50+ clothing.    UBSE performed    RTC 1 yr, sooner pending bx results

## 2024-12-30 ENCOUNTER — TELEPHONE (OUTPATIENT)
Dept: OTOLARYNGOLOGY | Facility: CLINIC | Age: 74
End: 2024-12-30
Payer: MEDICARE

## 2025-01-09 ENCOUNTER — CLINICAL SUPPORT (OUTPATIENT)
Dept: OTOLARYNGOLOGY | Facility: CLINIC | Age: 75
End: 2025-01-09
Payer: MEDICARE

## 2025-01-09 ENCOUNTER — OFFICE VISIT (OUTPATIENT)
Dept: OTOLARYNGOLOGY | Facility: CLINIC | Age: 75
End: 2025-01-09
Payer: MEDICARE

## 2025-01-09 VITALS
TEMPERATURE: 97 F | DIASTOLIC BLOOD PRESSURE: 83 MMHG | HEIGHT: 60 IN | HEART RATE: 90 BPM | WEIGHT: 146.88 LBS | BODY MASS INDEX: 28.83 KG/M2 | SYSTOLIC BLOOD PRESSURE: 156 MMHG

## 2025-01-09 DIAGNOSIS — Z82.2 FAMILY HISTORY OF HEARING LOSS: ICD-10-CM

## 2025-01-09 DIAGNOSIS — H90.A21 SENSORINEURAL HEARING LOSS (SNHL) OF RIGHT EAR WITH RESTRICTED HEARING OF LEFT EAR: Primary | ICD-10-CM

## 2025-01-09 DIAGNOSIS — H93.299 REDUCED SPEECH DISCRIMINATION: ICD-10-CM

## 2025-01-09 DIAGNOSIS — Z77.122 HISTORY OF EXPOSURE TO NOISE: ICD-10-CM

## 2025-01-09 DIAGNOSIS — H90.A22 SENSORINEURAL HEARING LOSS (SNHL) OF LEFT EAR WITH RESTRICTED HEARING OF RIGHT EAR: ICD-10-CM

## 2025-01-09 DIAGNOSIS — H90.3 SENSORINEURAL HEARING LOSS (SNHL) OF BOTH EARS: Primary | ICD-10-CM

## 2025-01-09 DIAGNOSIS — H93.13 TINNITUS OF BOTH EARS: ICD-10-CM

## 2025-01-09 PROCEDURE — 92550 TYMPANOMETRY & REFLEX THRESH: CPT | Mod: S$GLB,,, | Performed by: AUDIOLOGIST-HEARING AID FITTER

## 2025-01-09 PROCEDURE — 3079F DIAST BP 80-89 MM HG: CPT | Mod: CPTII,S$GLB,, | Performed by: STUDENT IN AN ORGANIZED HEALTH CARE EDUCATION/TRAINING PROGRAM

## 2025-01-09 PROCEDURE — 1160F RVW MEDS BY RX/DR IN RCRD: CPT | Mod: CPTII,S$GLB,, | Performed by: STUDENT IN AN ORGANIZED HEALTH CARE EDUCATION/TRAINING PROGRAM

## 2025-01-09 PROCEDURE — 3288F FALL RISK ASSESSMENT DOCD: CPT | Mod: CPTII,S$GLB,, | Performed by: STUDENT IN AN ORGANIZED HEALTH CARE EDUCATION/TRAINING PROGRAM

## 2025-01-09 PROCEDURE — 99204 OFFICE O/P NEW MOD 45 MIN: CPT | Mod: S$GLB,,, | Performed by: STUDENT IN AN ORGANIZED HEALTH CARE EDUCATION/TRAINING PROGRAM

## 2025-01-09 PROCEDURE — 1126F AMNT PAIN NOTED NONE PRSNT: CPT | Mod: CPTII,S$GLB,, | Performed by: STUDENT IN AN ORGANIZED HEALTH CARE EDUCATION/TRAINING PROGRAM

## 2025-01-09 PROCEDURE — 3077F SYST BP >= 140 MM HG: CPT | Mod: CPTII,S$GLB,, | Performed by: STUDENT IN AN ORGANIZED HEALTH CARE EDUCATION/TRAINING PROGRAM

## 2025-01-09 PROCEDURE — 92557 COMPREHENSIVE HEARING TEST: CPT | Mod: S$GLB,,, | Performed by: AUDIOLOGIST-HEARING AID FITTER

## 2025-01-09 PROCEDURE — 1159F MED LIST DOCD IN RCRD: CPT | Mod: CPTII,S$GLB,, | Performed by: STUDENT IN AN ORGANIZED HEALTH CARE EDUCATION/TRAINING PROGRAM

## 2025-01-09 PROCEDURE — 1101F PT FALLS ASSESS-DOCD LE1/YR: CPT | Mod: CPTII,S$GLB,, | Performed by: STUDENT IN AN ORGANIZED HEALTH CARE EDUCATION/TRAINING PROGRAM

## 2025-01-09 NOTE — PROGRESS NOTES
Ear, Nose, & Throat  Otolaryngology - Head & Neck Surgery    Summary of Visit:  Chely Kramer is a kind patient who was referred to me by Juan Veliz in consultation for Cough and Hearing Loss  As I discussed with the her, her audiogram shows evidence of profound bilateral sensorineural hearing loss.  I believe she is a good candidate for cochlear implant evaluation.  Preoperative workup initiated and referral placed.  Thank you for this consult. Please feel free to reach out to me or my office with any concerns if needed.     Richard Thurman MD  [address], [address]  [place] [address]  P:   F: [phone]  Subjective:     Chief Complaint:   Chief Complaint   Patient presents with    Cough    Hearing Loss       Chely Kramer is a 74 y.o. female who was referred to me by Juan Veliz in consultation for chronic, progressive hearing loss. She first noticed a change in his hearing several years ago.    She is a  on [employer].  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  She has a family history of multiple sisters with profound hearing loss.  No other people in her family with a cochlear implants.    Associated symptoms include non-pulsatile tinnitus. She denies any vertigo, aural fullness, otalgia, or otorrhea.     Otologic history is significant for none    Pneumovax completed 1/1/2018      Past Medical History  Active Ambulatory Problems     Diagnosis Date Noted    Primary open angle glaucoma (POAG) of left eye, indeterminate stage 01/10/2019    History of colonic diverticulitis 01/10/2019    Known medical problems 01/11/2019    Hx of basal cell carcinoma 01/15/2019    Hepatitis C antibody positive in blood 01/18/2019    Obesity (BMI 30.0-34.9) 01/13/2021    Blood type O- 01/13/2021    DJD (degenerative joint disease) of right wrist 01/14/2022    Diverticulitis of large intestine with abscess without bleeding 01/29/2024    Osteopenia of multiple sites  02/02/2024    Renal mass 02/21/2024    Frequency of urination 02/21/2024    Moderate malnutrition 02/22/2024    Renal cell carcinoma of left kidney 05/09/2024    Aortic atherosclerosis 11/07/2024     Resolved Ambulatory Problems     Diagnosis Date Noted    Cellulitis of wrist 01/22/2019    Cat bite 01/22/2019    Hypokalemia 01/24/2019     Past Medical History:   Diagnosis Date    Cancer     Diverticulitis     Glaucoma     [place] (hard of hearing)        Past Surgical History  She has a past surgical history that includes Adenoidectomy; Tonsillectomy; Appendectomy (1998); Hernia repair (Right, 2003); Incision and drainage of hand (Left, 1/22/2019); Breast biopsy; Breast cyst excision; Needle localization (N/A, 2/22/2024); Robot-assisted laparoscopic nephrectomy (Left, 4/24/2024); xi robotic colectomy, partial (N/A, 8/20/2024); and Robot-assisted lysis of adhesions (N/A, 8/20/2024).    Past Surgical History:   Procedure Laterality Date    ADENOIDECTOMY      APPENDECTOMY  1998    Ruptured    BREAST BIOPSY      BREAST CYST EXCISION      HERNIA REPAIR Right 2003    Ventral    INCISION AND DRAINAGE OF HAND Left 1/22/2019    Procedure: INCISION AND DRAINAGE, HAND (ADD ON );  Surgeon: Sameer Evans MD;  Location: [facility], [place], operated by [facility] OR;  Service: Orthopedics;  Laterality: Left;  AXILLARY BLOCK  (ADD ON )    NEEDLE LOCALIZATION N/A 2/22/2024    Procedure: ABSCESS DRAINAGE;  Surgeon: Fernando Thurman MD;  Location: [facility], [place], operated by [facility] CATH LAB;  Service: Radiology;  Laterality: N/A;    ROBOT-ASSISTED LAPAROSCOPIC NEPHRECTOMY Left 4/24/2024    Procedure: ROBOTIC NEPHRECTOMY;  Surgeon: Temo Hodge MD;  Location: [facility] OR;  Service: Urology;  Laterality: Left;    ROBOT-ASSISTED LYSIS OF ADHESIONS N/A 8/20/2024    Procedure: ROBOTIC LYSIS, ADHESIONS;  Surgeon: Tracee Thurman MD;  Location: [facility], [place], operated by [facility] OR;  Service: Colon and Rectal;  Laterality: N/A;    TONSILLECTOMY      XI ROBOTIC COLECTOMY, PARTIAL N/A 8/20/2024    Procedure: XI ROBOTIC SIGMOID COLECTOMY, flexible  sigmoidoscopy;  Surgeon: Tracee Thurman MD;  Location: [facility];  Service: Colon and Rectal;  Laterality: N/A;        Family History  Her family history includes Heart disease (age of onset: 74) in her father; Mental illness in her brother; Parkinsonism in her brother.    Social History  She reports that she quit smoking about 32 years ago. Her smoking use included cigarettes. She started smoking about 50 years ago. She has a 17.3 pack-year smoking history. She has quit using smokeless tobacco. She reports that she does not currently use alcohol. She reports that she does not use drugs.    Allergies  She is allergic to ciprofloxacin (bulk) and flagyl [metronidazole].    Medications  She has a current medication list which includes the following prescription(s): acetaminophen, ascorbic acid (vitamin c), brimonidine 0.2%, latanoprost, and pneumococcal vaccine.    ROS:  Pertinent positive and negative review of systems as noted in HPI.      Objective:     BP (!) 156/83 (BP Location: Left arm, Patient Position: Sitting)   Pulse 90   Temp 97.2 °F (36.2 °C) (Temporal)   Ht 5' (1.524 m)   Wt 66.6 kg (146 lb 14.4 oz)   BMI 28.69 kg/m²    Constitutional: Well appearing, communicating. No acute distress  Voice: Euphonic  Eyes: Conjunctiva WNL, Pupils reactive  Head/Face: House Brackmann I Bilaterally.  Right Ear:    Auricle normally developed   EAC: normal   Tympanic membrane: intact   Middle Ear: No effusion present and Ossicles in normal position  Left Ear:    Auricle normally developed   EAC: normal   Tympanic membrane: intact   Middle Ear: No effusion present and Ossicles in normal position  Vestibular:    Spontaneous Nystagmus: none  Neuro/Psychiatric:     Affect: Appropriate   Eyes: EOMI intact  Respiratory: No increased WOB, no stridor       Data Review:   LABS    I have independently reviewed the lab results shown above. Findings significant for the conditions being treated include: none    IMAGING    No pertinent  imaging available    AUDIO    I have independently reviewed the following audiogram and reviewed the report. Findings as follows:     Pure Tone Audiometry: Symmetric  Right - Mild (26-40 dB) to Profound (>90 dB) high frequency sensorineural hearing loss   Left - Mild (26-40 dB) to Profound (>90 dB) high frequency sensorineural hearing loss     Pure Tone Averages  Right - 58 dB  Left - 68 dB    Tympanometry  Right: Type A  Left: Type A    Word Discrimination Score  Right: 16%  Left 48%    Procedures:       Assessment:     1. Sensorineural hearing loss (SNHL) of both ears    2. Tinnitus of both ears        Plan:     I had a long discussion with the patient regarding her condition and the further workup and management options.  As I discussed with her, believe she is a candidate for a cochlear implant evaluation.  We discussed her options of hearing aids, cochlear implant, or continued surveillance.  I discussed with her that I did not feel that hearing aids would appropriately rehabilitate her severely damaged hearing.  She is interested in pursuing cochlear implant.    Referral for cochlear implant evaluation with audiology  CT temporal bone  Vaccinations up-to-date  Follow up with Dr. Welch or Cathy following cochlear implant evaluation      Voice recognition software was used in the creation of this note/communication and any sound-alike errors which may have occurred from its use should be taken in context when interpreting. If such errors prevent a clear understanding of the note/communication, please contact the office for clarification.   Problem List Items Addressed This Visit    None  Visit Diagnoses       Sensorineural hearing loss (SNHL) of both ears    -  Primary    Relevant Orders    CT Temporal Bone without contrast    Tinnitus of both ears

## 2025-01-09 NOTE — Clinical Note
Your patient, Chely Kramer, was recently seen for an audiogram.  My assessment and recommendations are enclosed.  If you should have any questions or concerns, please contact me at [phone].   Sincerely, David Cary, CCC-A Audiologist [facility]

## 2025-01-12 NOTE — PROGRESS NOTES
David Cary, CCC-A  Audiologist - [facility] [address] [place] [address]  [email or web address]  [phone]    Patient: Chely Kramer   MRN: [id number]  [address] [facility]   Home Phone [phone]   Work Phone [phone]   Mobile [phone]   : 1950  CORRAL: 2025      AUDIOLOGICAL EVALUATION    Ms. Chely Kramer was seen in the clinic today for an audiological evaluation.  Ms. Kramer reported decreased hearing, history of noise exposure, and family history of hearing loss.  Ms. Kramer denied tinnitus and dizziness.    Audiological testing revealed a mild to profound sensorineural hearing loss for the Right ear and a mild to severe sensorineural hearing loss for the Left ear.  A speech reception threshold was obtained at 65 dB HL for the Right ear and at 65 dB HL for the Left ear.  Speech discrimination was 16% for the Right ear and 48% for the Left ear.  There was an asymmetry noted between ears from 3315-8024 Hz, with the Right ear poorer than the Left ear.    Tympanometry testing revealed a Type A tympanogram for the Right ear and a Type A tympanogram for the Left ear.  Acoustic reflex thresholds were elevated/absent ipsilaterally in both ears.      RECOMMENDATIONS:   It is recommended that Chely Kramer:  Follow up medically with Dr. Thurman.  Receive binaural hearing aids to improve speech understanding, pending medical clearance.  Continue to receive audiological monitoring annually.  Use precaution and/or hearing protection in noisy environments.    If you should have any questions or concerns regarding the above information, please do not hesitate to contact me at [phone].      _______________________________  David Cray, JOSEPH-A  Audiologist

## 2025-02-24 DIAGNOSIS — Z00.00 ENCOUNTER FOR MEDICARE ANNUAL WELLNESS EXAM: ICD-10-CM

## 2025-04-23 NOTE — NURSING TRANSFER
Nursing Transfer Note      4/24/2024   11:36 AM    Nurse giving handoff:avril saez  Nurse receiving handoff:rn room 526    Reason patient is being transferred: post op    Transfer To: room 526    Transfer via stretcher    Transfer with 2l to O2    Transported by transport    Transfer Vital Signs:  Blood Pressure:see flowsheet  Heart Rate:  O2:  Temperature:  Respirations:    Telemetry:   Order for Tele Monitor?     Additional Lines: Mishra Catheter    4eyes on Skin: no    Medicines sent:     Any special needs or follow-up needed:     Patient belongings transferred with patient: No    Chart send with patient: Yes    Notified: family    Patient reassessed at:  (date, time)  1  Upon arrival to floor:    ----- Message from Dr. Nieves Elias, DO sent at 4/23/2025 12:22 PM CDT -----  Please let mom know her labs are normal

## (undated) DEVICE — ADHESIVE DERMABOND ADVANCED

## (undated) DEVICE — DRAPE COLUMN DAVINCI XI

## (undated) DEVICE — KIT WING PAD POSITIONING

## (undated) DEVICE — COVER TIP CURVED SCISSORS XI

## (undated) DEVICE — SPLINT PLASTER FAST SET 5X30IN

## (undated) DEVICE — DRESSING ADAPTIC TOUCH 3X4

## (undated) DEVICE — STAPLER SUREFORM SGL USE 45

## (undated) DEVICE — SUT STEEL 5-0 18

## (undated) DEVICE — GAUZE SPONGE 4X4 12PLY

## (undated) DEVICE — LUBRICANT SURGILUBE 2 OZ

## (undated) DEVICE — DRAIN CHANNEL ROUND 19FR

## (undated) DEVICE — SEE MEDLINE ITEM 152515

## (undated) DEVICE — APPLICATOR CHLORAPREP ORN 26ML

## (undated) DEVICE — EVACUATOR SURG SUC BLBLF 100ML

## (undated) DEVICE — ENDOCATCH 15MM

## (undated) DEVICE — SUT PROLENE 2-0 SH 36IN BLU

## (undated) DEVICE — NDL INSUF ULTRA VERESS 120MM

## (undated) DEVICE — PORT ACCESS 5MM W/120MM

## (undated) DEVICE — TROCAR ENDOPATH XCEL 15MM 10CM

## (undated) DEVICE — SUT ETHILON 2-0 FS 18IN BLK

## (undated) DEVICE — SOL POVIDONE PREP IODINE 4 OZ

## (undated) DEVICE — IRRIGATOR ENDOSCOPY DISP.

## (undated) DEVICE — DRAPE ARM DAVINCI XI

## (undated) DEVICE — SET TRI-LUMEN FILTERED TUBE

## (undated) DEVICE — TRAY FOLEY 16FR INFECTION CONT

## (undated) DEVICE — CANNULA SEAL 12MM

## (undated) DEVICE — Device

## (undated) DEVICE — SYS SEE SHARP SCP ANTIFG LNG

## (undated) DEVICE — PAD UNDERPAD 30X30

## (undated) DEVICE — CATH ALL PUR URTHL 20FR

## (undated) DEVICE — APPLICATOR ENDOSCOPIC

## (undated) DEVICE — SYR 50ML CATH TIP

## (undated) DEVICE — SEAL UNIVERSAL 5MM-8MM XI

## (undated) DEVICE — RELOAD SUREFORM 45 3.5 BLU 6R

## (undated) DEVICE — RELOAD SUREFORM 45 4.3 GRN 6R

## (undated) DEVICE — STRIP STERI REIN CLSR 1/2X2IN

## (undated) DEVICE — PORT AIRSEAL 12/120MM LPI

## (undated) DEVICE — CLIPPER BLADE MOD 4406 (CAREF)

## (undated) DEVICE — CANNULA REDUCER 12-8MM

## (undated) DEVICE — SOL 9P NACL IRR PIC IL

## (undated) DEVICE — DEVICE VLOC CLSR 3-0 GU-46 9IN

## (undated) DEVICE — OBTURATOR BLADELESS 8MM XI

## (undated) DEVICE — GLOVE BIOGEL SKINSENSE PI 7.0

## (undated) DEVICE — DRAPE LAPSCP CHOLE 122X102X78

## (undated) DEVICE — KIT SURGIFLO HEMOSTATIC MATRIX

## (undated) DEVICE — STAPLER ECHELON PWR CIR 29MM

## (undated) DEVICE — TOWEL OR DISP STRL BLUE 4/PK

## (undated) DEVICE — ELECTRODE REM PLYHSV RETURN 9

## (undated) DEVICE — SCRUB 10% POVIDONE IODINE 4OZ

## (undated) DEVICE — NDL INSUFFLATION VERRES 120MM

## (undated) DEVICE — SOL PVP-I SCRUB 7.5% 4OZ

## (undated) DEVICE — GLOVE SENSICARE PI SURG 6

## (undated) DEVICE — SUT 0 VICRYL PLUS CT-1 27IN

## (undated) DEVICE — SUT VICRYL 3-0 27 SH

## (undated) DEVICE — CLIP HEMO-LOK MLX LARGE LF

## (undated) DEVICE — COVER MAYO STND XL 30X57IN

## (undated) DEVICE — TAPE ADHESIVE 2IN3M

## (undated) DEVICE — PADDING CAST 4IN SPECIALIST

## (undated) DEVICE — SUT MCRYL PLUS 4-0 PS2 27IN

## (undated) DEVICE — SEALER VESSEL EXTEND

## (undated) DEVICE — SPLINT COMFORMABLE 5X30

## (undated) DEVICE — TIP YANKAUERS BULB NO VENT

## (undated) DEVICE — TAPE SILK 3IN

## (undated) DEVICE — GLOVE SENSICARE PI SURG 7

## (undated) DEVICE — BUCKET PLASTER DISPOSABLE

## (undated) DEVICE — TROCAR ENDOPATH XCEL 5X100MM

## (undated) DEVICE — SOL NS 1000CC

## (undated) DEVICE — SUT 2-0 VICRYL / SH (J417)

## (undated) DEVICE — CONNECTOR TUBING STR 5 IN 1

## (undated) DEVICE — SET EXTENSION 30 IN W/LL ROLLE

## (undated) DEVICE — TOURNIQUET SB QC SP 18X4IN

## (undated) DEVICE — GOWN SURGICAL X-LARGE

## (undated) DEVICE — STAPLER SKIN PROXIMATE WIDE

## (undated) DEVICE — GLOVE BIOGEL SKINSENSE PI 8.0

## (undated) DEVICE — GOWN SURGICAL XX LARGE X LONG

## (undated) DEVICE — SET DECANTER MEDICHOICE

## (undated) DEVICE — RELOAD SUREFORM 45 2.5 WHT 6R

## (undated) DEVICE — SYR IRRIGATION BULB STER 60ML

## (undated) DEVICE — DEVICE SNAPSECURE FOL CATH

## (undated) DEVICE — PACK ROBOTIC

## (undated) DEVICE — GLOVE SENSICARE PI GRN 6.5

## (undated) DEVICE — SCRUB HIBICLENS 4% CHG 4OZ

## (undated) DEVICE — DRAPE TOP 53X102IN

## (undated) DEVICE — SUT 3-0 VICRYL / SH (J416)

## (undated) DEVICE — GRASPER EPIX 5X20MM 45CM

## (undated) DEVICE — TUBING SUC UNIV W/CONN 12FT

## (undated) DEVICE — GLOVE BIOGEL SKINSENSE PI 6.5

## (undated) DEVICE — SOL ELECTROLUBE ANTI-STIC